# Patient Record
Sex: FEMALE | Race: WHITE | NOT HISPANIC OR LATINO
[De-identification: names, ages, dates, MRNs, and addresses within clinical notes are randomized per-mention and may not be internally consistent; named-entity substitution may affect disease eponyms.]

---

## 2018-11-10 ENCOUNTER — TRANSCRIPTION ENCOUNTER (OUTPATIENT)
Age: 48
End: 2018-11-10

## 2019-05-19 ENCOUNTER — TRANSCRIPTION ENCOUNTER (OUTPATIENT)
Age: 49
End: 2019-05-19

## 2021-04-27 ENCOUNTER — EMERGENCY (EMERGENCY)
Facility: HOSPITAL | Age: 51
LOS: 1 days | Discharge: HOME | End: 2021-04-27
Attending: EMERGENCY MEDICINE
Payer: COMMERCIAL

## 2021-04-27 ENCOUNTER — TRANSCRIPTION ENCOUNTER (OUTPATIENT)
Age: 51
End: 2021-04-27

## 2021-04-27 VITALS
DIASTOLIC BLOOD PRESSURE: 80 MMHG | TEMPERATURE: 97 F | RESPIRATION RATE: 18 BRPM | OXYGEN SATURATION: 100 % | WEIGHT: 205.03 LBS | SYSTOLIC BLOOD PRESSURE: 131 MMHG | HEART RATE: 75 BPM | HEIGHT: 62.5 IN

## 2021-04-27 VITALS
HEART RATE: 81 BPM | OXYGEN SATURATION: 100 % | RESPIRATION RATE: 20 BRPM | SYSTOLIC BLOOD PRESSURE: 150 MMHG | DIASTOLIC BLOOD PRESSURE: 68 MMHG | TEMPERATURE: 98 F

## 2021-04-27 DIAGNOSIS — Z98.891 HISTORY OF UTERINE SCAR FROM PREVIOUS SURGERY: Chronic | ICD-10-CM

## 2021-04-27 LAB
ALBUMIN SERPL ELPH-MCNC: 4.1 G/DL — SIGNIFICANT CHANGE UP (ref 3.3–5)
ALP SERPL-CCNC: 78 U/L — SIGNIFICANT CHANGE UP (ref 40–120)
ALT FLD-CCNC: 17 U/L — SIGNIFICANT CHANGE UP (ref 10–45)
ANION GAP SERPL CALC-SCNC: 12 MMOL/L — SIGNIFICANT CHANGE UP (ref 5–17)
APPEARANCE UR: CLEAR — SIGNIFICANT CHANGE UP
APTT BLD: 33.7 SEC — SIGNIFICANT CHANGE UP (ref 27.5–35.5)
AST SERPL-CCNC: 17 U/L — SIGNIFICANT CHANGE UP (ref 10–40)
BACTERIA # UR AUTO: NEGATIVE — SIGNIFICANT CHANGE UP
BASOPHILS # BLD AUTO: 0.04 K/UL — SIGNIFICANT CHANGE UP (ref 0–0.2)
BASOPHILS NFR BLD AUTO: 0.4 % — SIGNIFICANT CHANGE UP (ref 0–2)
BILIRUB SERPL-MCNC: 0.4 MG/DL — SIGNIFICANT CHANGE UP (ref 0.2–1.2)
BILIRUB UR-MCNC: NEGATIVE — SIGNIFICANT CHANGE UP
BUN SERPL-MCNC: 9 MG/DL — SIGNIFICANT CHANGE UP (ref 7–23)
CALCIUM SERPL-MCNC: 9.4 MG/DL — SIGNIFICANT CHANGE UP (ref 8.4–10.5)
CHLORIDE SERPL-SCNC: 101 MMOL/L — SIGNIFICANT CHANGE UP (ref 96–108)
CO2 SERPL-SCNC: 22 MMOL/L — SIGNIFICANT CHANGE UP (ref 22–31)
COLOR SPEC: YELLOW — SIGNIFICANT CHANGE UP
CREAT SERPL-MCNC: 0.74 MG/DL — SIGNIFICANT CHANGE UP (ref 0.5–1.3)
DIFF PNL FLD: NEGATIVE — SIGNIFICANT CHANGE UP
EOSINOPHIL # BLD AUTO: 0.51 K/UL — HIGH (ref 0–0.5)
EOSINOPHIL NFR BLD AUTO: 5.3 % — SIGNIFICANT CHANGE UP (ref 0–6)
EPI CELLS # UR: 1 /HPF — SIGNIFICANT CHANGE UP
GLUCOSE SERPL-MCNC: 91 MG/DL — SIGNIFICANT CHANGE UP (ref 70–99)
GLUCOSE UR QL: NEGATIVE — SIGNIFICANT CHANGE UP
HCG SERPL-ACNC: <2 MIU/ML — SIGNIFICANT CHANGE UP
HCT VFR BLD CALC: 43.1 % — SIGNIFICANT CHANGE UP (ref 34.5–45)
HGB BLD-MCNC: 13.9 G/DL — SIGNIFICANT CHANGE UP (ref 11.5–15.5)
HYALINE CASTS # UR AUTO: 1 /LPF — SIGNIFICANT CHANGE UP (ref 0–2)
IMM GRANULOCYTES NFR BLD AUTO: 0.3 % — SIGNIFICANT CHANGE UP (ref 0–1.5)
INR BLD: 1.03 RATIO — SIGNIFICANT CHANGE UP (ref 0.88–1.16)
KETONES UR-MCNC: NEGATIVE — SIGNIFICANT CHANGE UP
LEUKOCYTE ESTERASE UR-ACNC: NEGATIVE — SIGNIFICANT CHANGE UP
LYMPHOCYTES # BLD AUTO: 2.66 K/UL — SIGNIFICANT CHANGE UP (ref 1–3.3)
LYMPHOCYTES # BLD AUTO: 27.8 % — SIGNIFICANT CHANGE UP (ref 13–44)
MCHC RBC-ENTMCNC: 30.4 PG — SIGNIFICANT CHANGE UP (ref 27–34)
MCHC RBC-ENTMCNC: 32.3 GM/DL — SIGNIFICANT CHANGE UP (ref 32–36)
MCV RBC AUTO: 94.3 FL — SIGNIFICANT CHANGE UP (ref 80–100)
MONOCYTES # BLD AUTO: 0.62 K/UL — SIGNIFICANT CHANGE UP (ref 0–0.9)
MONOCYTES NFR BLD AUTO: 6.5 % — SIGNIFICANT CHANGE UP (ref 2–14)
NEUTROPHILS # BLD AUTO: 5.7 K/UL — SIGNIFICANT CHANGE UP (ref 1.8–7.4)
NEUTROPHILS NFR BLD AUTO: 59.7 % — SIGNIFICANT CHANGE UP (ref 43–77)
NITRITE UR-MCNC: NEGATIVE — SIGNIFICANT CHANGE UP
NRBC # BLD: 0 /100 WBCS — SIGNIFICANT CHANGE UP (ref 0–0)
PH UR: 6 — SIGNIFICANT CHANGE UP (ref 5–8)
PLATELET # BLD AUTO: 411 K/UL — HIGH (ref 150–400)
POTASSIUM SERPL-MCNC: 3.8 MMOL/L — SIGNIFICANT CHANGE UP (ref 3.5–5.3)
POTASSIUM SERPL-SCNC: 3.8 MMOL/L — SIGNIFICANT CHANGE UP (ref 3.5–5.3)
PROT SERPL-MCNC: 7.2 G/DL — SIGNIFICANT CHANGE UP (ref 6–8.3)
PROT UR-MCNC: NEGATIVE — SIGNIFICANT CHANGE UP
PROTHROM AB SERPL-ACNC: 12.3 SEC — SIGNIFICANT CHANGE UP (ref 10.6–13.6)
RBC # BLD: 4.57 M/UL — SIGNIFICANT CHANGE UP (ref 3.8–5.2)
RBC # FLD: 12.9 % — SIGNIFICANT CHANGE UP (ref 10.3–14.5)
RBC CASTS # UR COMP ASSIST: 3 /HPF — SIGNIFICANT CHANGE UP (ref 0–4)
SODIUM SERPL-SCNC: 135 MMOL/L — SIGNIFICANT CHANGE UP (ref 135–145)
SP GR SPEC: 1.02 — SIGNIFICANT CHANGE UP (ref 1.01–1.02)
UROBILINOGEN FLD QL: NEGATIVE — SIGNIFICANT CHANGE UP
WBC # BLD: 9.56 K/UL — SIGNIFICANT CHANGE UP (ref 3.8–10.5)
WBC # FLD AUTO: 9.56 K/UL — SIGNIFICANT CHANGE UP (ref 3.8–10.5)
WBC UR QL: 1 /HPF — SIGNIFICANT CHANGE UP (ref 0–5)

## 2021-04-27 PROCEDURE — 74177 CT ABD & PELVIS W/CONTRAST: CPT | Mod: 26,MA

## 2021-04-27 PROCEDURE — 81001 URINALYSIS AUTO W/SCOPE: CPT

## 2021-04-27 PROCEDURE — 83690 ASSAY OF LIPASE: CPT

## 2021-04-27 PROCEDURE — 87086 URINE CULTURE/COLONY COUNT: CPT

## 2021-04-27 PROCEDURE — 74177 CT ABD & PELVIS W/CONTRAST: CPT

## 2021-04-27 PROCEDURE — 85610 PROTHROMBIN TIME: CPT

## 2021-04-27 PROCEDURE — 99284 EMERGENCY DEPT VISIT MOD MDM: CPT | Mod: 25

## 2021-04-27 PROCEDURE — 85025 COMPLETE CBC W/AUTO DIFF WBC: CPT

## 2021-04-27 PROCEDURE — 85730 THROMBOPLASTIN TIME PARTIAL: CPT

## 2021-04-27 PROCEDURE — 84702 CHORIONIC GONADOTROPIN TEST: CPT

## 2021-04-27 PROCEDURE — 99284 EMERGENCY DEPT VISIT MOD MDM: CPT

## 2021-04-27 PROCEDURE — 36000 PLACE NEEDLE IN VEIN: CPT | Mod: XU

## 2021-04-27 PROCEDURE — 80053 COMPREHEN METABOLIC PANEL: CPT

## 2021-04-27 RX ORDER — ACETAMINOPHEN 500 MG
975 TABLET ORAL ONCE
Refills: 0 | Status: COMPLETED | OUTPATIENT
Start: 2021-04-27 | End: 2021-04-27

## 2021-04-27 RX ORDER — SODIUM CHLORIDE 9 MG/ML
1000 INJECTION INTRAMUSCULAR; INTRAVENOUS; SUBCUTANEOUS ONCE
Refills: 0 | Status: COMPLETED | OUTPATIENT
Start: 2021-04-27 | End: 2021-04-27

## 2021-04-27 RX ADMIN — SODIUM CHLORIDE 1000 MILLILITER(S): 9 INJECTION INTRAMUSCULAR; INTRAVENOUS; SUBCUTANEOUS at 20:36

## 2021-04-27 RX ADMIN — Medication 975 MILLIGRAM(S): at 18:30

## 2021-04-27 NOTE — ED PROVIDER NOTE - NSFOLLOWUPINSTRUCTIONS_ED_ALL_ED_FT
1- Tylenol as needed for pain  2- Continue all medications as prescribed  3- Follow up with Dr Grey as planned on Monday  4- Return to ER for any new or worsening symptoms

## 2021-04-27 NOTE — ED PROVIDER NOTE - CARE PROVIDER_API CALL
Porter Grey  GASTROENTEROLOGY  [address] [place] [address], [address] [place] [address]  Phone: [phone]  Fax: [phone]  Follow Up Time:

## 2021-04-27 NOTE — ED ADULT NURSE NOTE - NSIMPLEMENTINTERV_GEN_ALL_ED
Implemented All Universal Safety Interventions:  [place] to call system. Call bell, personal items and telephone within reach. Instruct patient to call for assistance. Room bathroom lighting operational. Non-slip footwear when patient is off stretcher. Physically safe environment: no spills, clutter or unnecessary equipment. Stretcher in lowest position, wheels locked, appropriate side rails in place.

## 2021-04-27 NOTE — ED PROVIDER NOTE - PATIENT PORTAL LINK FT
You can access the FollowMyHealth Patient Portal offered by [facility] by registering at the following website: [email or web address]. By joining [facility]’s FollowMyHealth portal, you will also be able to view your health information using other applications (apps) compatible with our system.

## 2021-04-27 NOTE — ED ADULT NURSE NOTE - OBJECTIVE STATEMENT
50y PMHx depression & neuropathy [facility] c/o LLQ pain since last Wednesday. Endorses pain exacerbation with PO intake so decreased PO intake since onset of Sx, cannot identify any relieving factors. Endorses FHx diverticulitis. Denies constipation, bright red or black stools, CP, SOB, H/A, N/V/D, f/c, dizziness, & urinary symptoms. A&Ox4. No respiratory distress noted on RA. Abdomen soft, nondistended, & LLQ tender to palpation. Negative B/L CVA tenderness with mild suprapubic tenderness. Skin warm, dry, & intact. MAEx4, able to ambulate independently. No LE edema noted on exam.

## 2021-04-27 NOTE — ED PROVIDER NOTE - RAPID ASSESSMENT
50 F presents with lower abdominal pain left worse than right. Denies Nausea, vomiting, diarrhea, fever, or chills. Endorses omeprazole daily. Last endorsed Tylenol yesterday.      Scribe Statement: I, Daysi Mccarty, attest that this documentation has been prepared under the direction and in the presence of Prabhakar Tucker (PA) 50 F presents with lower abdominal pain left worse than right. Denies Nausea, vomiting, diarrhea, fever, or chills. Endorses omeprazole daily. Last endorsed Tylenol yesterday.      Scribe Statement: I, Daysi Mccarty, attest that this documentation has been prepared under the direction and in the presence of Prabhakar Avina) 50 F presents with lower abdominal pain left worse than right. Denies Nausea, vomiting, diarrhea, hematochezia, melena, fever, or chills. Endorses omeprazole daily. Last endorsed Tylenol yesterday. FHx fo diverticulosis bu no personal hx. PSHx C/Sx2. Last BM this afternoon- normal.     Scribe Statement: ILul Tiffany, attest that this documentation has been prepared under the direction and in the presence of Prabhakar Avina (PA)    Prabhakar VELASQUEZ PA-C, personally performed the service described in the documentation recorded by the scribe in my presence, and it accurately and completely records my words and actions.

## 2021-04-27 NOTE — ED PROVIDER NOTE - OBJECTIVE STATEMENT
50 y.o. female history of 2 's, GERD, coming in with 1 weeks of LLQ pain.  Pain is worse after eating, no radiation.  No dysuria, no frequency, no gross hematuria.  No fevers no chills.  No nausea no vomiting.  + family history of diverticulitis.  Last BM today and was normal.  Menses has become irregular, unsure her LMP but they are becoming less frequent. 50 y.o. female history of 2 's, GERD, coming in with 1 weeks of LLQ pain.  Pain is worse after eating, no radiation.  No dysuria, no frequency, no gross hematuria.  No fevers no chills.  No nausea no vomiting.  + family history of diverticulitis.  Last BM today and was normal.  Menses has become irregular, unsure her LMP but they are becoming less frequent.    Attendinyo female presents with LLQ pain for about 1 week.  no nausea.  no fever.  has worse pain with eating.

## 2021-04-28 LAB
CULTURE RESULTS: SIGNIFICANT CHANGE UP
SPECIMEN SOURCE: SIGNIFICANT CHANGE UP

## 2021-05-10 ENCOUNTER — INPATIENT (INPATIENT)
Facility: HOSPITAL | Age: 51
LOS: 3 days | Discharge: HOME | DRG: 392 | End: 2021-05-14
Attending: INTERNAL MEDICINE | Admitting: INTERNAL MEDICINE
Payer: COMMERCIAL

## 2021-05-10 VITALS
HEART RATE: 84 BPM | DIASTOLIC BLOOD PRESSURE: 78 MMHG | SYSTOLIC BLOOD PRESSURE: 118 MMHG | TEMPERATURE: 99 F | WEIGHT: 205.03 LBS | OXYGEN SATURATION: 97 % | RESPIRATION RATE: 16 BRPM | HEIGHT: 62.5 IN

## 2021-05-10 DIAGNOSIS — Z98.891 HISTORY OF UTERINE SCAR FROM PREVIOUS SURGERY: Chronic | ICD-10-CM

## 2021-05-10 DIAGNOSIS — Z98.891 HISTORY OF UTERINE SCAR FROM PREVIOUS SURGERY: ICD-10-CM

## 2021-05-10 PROBLEM — M19.90 UNSPECIFIED OSTEOARTHRITIS, UNSPECIFIED SITE: Chronic | Status: ACTIVE | Noted: 2021-04-27

## 2021-05-10 LAB
ALBUMIN SERPL ELPH-MCNC: 4 G/DL — SIGNIFICANT CHANGE UP (ref 3.3–5)
ALP SERPL-CCNC: 64 U/L — SIGNIFICANT CHANGE UP (ref 40–120)
ALT FLD-CCNC: 18 U/L — SIGNIFICANT CHANGE UP (ref 10–45)
ANION GAP SERPL CALC-SCNC: 13 MMOL/L — SIGNIFICANT CHANGE UP (ref 5–17)
APPEARANCE UR: CLEAR — SIGNIFICANT CHANGE UP
AST SERPL-CCNC: 20 U/L — SIGNIFICANT CHANGE UP (ref 10–40)
BASE EXCESS BLDV CALC-SCNC: 0.7 MMOL/L — SIGNIFICANT CHANGE UP (ref -2–2)
BASOPHILS # BLD AUTO: 0.08 K/UL — SIGNIFICANT CHANGE UP (ref 0–0.2)
BASOPHILS NFR BLD AUTO: 1.1 % — SIGNIFICANT CHANGE UP (ref 0–2)
BILIRUB SERPL-MCNC: 0.3 MG/DL — SIGNIFICANT CHANGE UP (ref 0.2–1.2)
BILIRUB UR-MCNC: NEGATIVE — SIGNIFICANT CHANGE UP
BUN SERPL-MCNC: 6 MG/DL — LOW (ref 7–23)
CA-I SERPL-SCNC: 1.29 MMOL/L — SIGNIFICANT CHANGE UP (ref 1.12–1.3)
CALCIUM SERPL-MCNC: 9 MG/DL — SIGNIFICANT CHANGE UP (ref 8.4–10.5)
CHLORIDE BLDV-SCNC: 106 MMOL/L — SIGNIFICANT CHANGE UP (ref 96–108)
CHLORIDE SERPL-SCNC: 105 MMOL/L — SIGNIFICANT CHANGE UP (ref 96–108)
CO2 BLDV-SCNC: 28 MMOL/L — SIGNIFICANT CHANGE UP (ref 22–30)
CO2 SERPL-SCNC: 22 MMOL/L — SIGNIFICANT CHANGE UP (ref 22–31)
COLOR SPEC: YELLOW — SIGNIFICANT CHANGE UP
CREAT SERPL-MCNC: 0.91 MG/DL — SIGNIFICANT CHANGE UP (ref 0.5–1.3)
DIFF PNL FLD: NEGATIVE — SIGNIFICANT CHANGE UP
EOSINOPHIL # BLD AUTO: 0.46 K/UL — SIGNIFICANT CHANGE UP (ref 0–0.5)
EOSINOPHIL NFR BLD AUTO: 6.1 % — HIGH (ref 0–6)
GAS PNL BLDV: 144 MMOL/L — SIGNIFICANT CHANGE UP (ref 135–145)
GAS PNL BLDV: SIGNIFICANT CHANGE UP
GAS PNL BLDV: SIGNIFICANT CHANGE UP
GLUCOSE BLDV-MCNC: 146 MG/DL — HIGH (ref 70–99)
GLUCOSE SERPL-MCNC: 140 MG/DL — HIGH (ref 70–99)
GLUCOSE UR QL: NEGATIVE — SIGNIFICANT CHANGE UP
HCG SERPL-ACNC: <2 MIU/ML — SIGNIFICANT CHANGE UP
HCO3 BLDV-SCNC: 27 MMOL/L — SIGNIFICANT CHANGE UP (ref 21–29)
HCT VFR BLD CALC: 41.7 % — SIGNIFICANT CHANGE UP (ref 34.5–45)
HCT VFR BLDA CALC: 42 % — SIGNIFICANT CHANGE UP (ref 39–50)
HGB BLD CALC-MCNC: 13.5 G/DL — SIGNIFICANT CHANGE UP (ref 11.5–15.5)
HGB BLD-MCNC: 13.4 G/DL — SIGNIFICANT CHANGE UP (ref 11.5–15.5)
IMM GRANULOCYTES NFR BLD AUTO: 0.3 % — SIGNIFICANT CHANGE UP (ref 0–1.5)
KETONES UR-MCNC: SIGNIFICANT CHANGE UP
LACTATE BLDV-MCNC: 1.5 MMOL/L — SIGNIFICANT CHANGE UP (ref 0.7–2)
LEUKOCYTE ESTERASE UR-ACNC: NEGATIVE — SIGNIFICANT CHANGE UP
LYMPHOCYTES # BLD AUTO: 1.96 K/UL — SIGNIFICANT CHANGE UP (ref 1–3.3)
LYMPHOCYTES # BLD AUTO: 26.1 % — SIGNIFICANT CHANGE UP (ref 13–44)
MCHC RBC-ENTMCNC: 30.1 PG — SIGNIFICANT CHANGE UP (ref 27–34)
MCHC RBC-ENTMCNC: 32.1 GM/DL — SIGNIFICANT CHANGE UP (ref 32–36)
MCV RBC AUTO: 93.7 FL — SIGNIFICANT CHANGE UP (ref 80–100)
MONOCYTES # BLD AUTO: 0.48 K/UL — SIGNIFICANT CHANGE UP (ref 0–0.9)
MONOCYTES NFR BLD AUTO: 6.4 % — SIGNIFICANT CHANGE UP (ref 2–14)
NEUTROPHILS # BLD AUTO: 4.5 K/UL — SIGNIFICANT CHANGE UP (ref 1.8–7.4)
NEUTROPHILS NFR BLD AUTO: 60 % — SIGNIFICANT CHANGE UP (ref 43–77)
NITRITE UR-MCNC: NEGATIVE — SIGNIFICANT CHANGE UP
NRBC # BLD: 0 /100 WBCS — SIGNIFICANT CHANGE UP (ref 0–0)
PCO2 BLDV: 51 MMHG — HIGH (ref 35–50)
PH BLDV: 7.34 — LOW (ref 7.35–7.45)
PH UR: 6 — SIGNIFICANT CHANGE UP (ref 5–8)
PLATELET # BLD AUTO: 314 K/UL — SIGNIFICANT CHANGE UP (ref 150–400)
PO2 BLDV: 20 MMHG — LOW (ref 25–45)
POTASSIUM BLDV-SCNC: 3.4 MMOL/L — LOW (ref 3.5–5.3)
POTASSIUM SERPL-MCNC: 3.7 MMOL/L — SIGNIFICANT CHANGE UP (ref 3.5–5.3)
POTASSIUM SERPL-SCNC: 3.7 MMOL/L — SIGNIFICANT CHANGE UP (ref 3.5–5.3)
PROT SERPL-MCNC: 6.7 G/DL — SIGNIFICANT CHANGE UP (ref 6–8.3)
PROT UR-MCNC: 100 — SIGNIFICANT CHANGE UP
RBC # BLD: 4.45 M/UL — SIGNIFICANT CHANGE UP (ref 3.8–5.2)
RBC # FLD: 12.6 % — SIGNIFICANT CHANGE UP (ref 10.3–14.5)
SAO2 % BLDV: 25 % — LOW (ref 67–88)
SARS-COV-2 RNA SPEC QL NAA+PROBE: SIGNIFICANT CHANGE UP
SODIUM SERPL-SCNC: 140 MMOL/L — SIGNIFICANT CHANGE UP (ref 135–145)
SP GR SPEC: 1.03 — HIGH (ref 1.01–1.02)
UROBILINOGEN FLD QL: NEGATIVE — SIGNIFICANT CHANGE UP
WBC # BLD: 7.5 K/UL — SIGNIFICANT CHANGE UP (ref 3.8–10.5)
WBC # FLD AUTO: 7.5 K/UL — SIGNIFICANT CHANGE UP (ref 3.8–10.5)

## 2021-05-10 PROCEDURE — 99285 EMERGENCY DEPT VISIT HI MDM: CPT

## 2021-05-10 RX ORDER — GABAPENTIN 400 MG/1
600 CAPSULE ORAL DAILY
Refills: 0 | Status: DISCONTINUED | OUTPATIENT
Start: 2021-05-10 | End: 2021-05-14

## 2021-05-10 RX ORDER — VALACYCLOVIR 500 MG/1
1000 TABLET, FILM COATED ORAL DAILY
Refills: 0 | Status: DISCONTINUED | OUTPATIENT
Start: 2021-05-10 | End: 2021-05-12

## 2021-05-10 RX ORDER — TRAMADOL HYDROCHLORIDE 50 MG/1
25 TABLET ORAL EVERY 6 HOURS
Refills: 0 | Status: DISCONTINUED | OUTPATIENT
Start: 2021-05-10 | End: 2021-05-14

## 2021-05-10 RX ORDER — LIDOCAINE 4 G/100G
10 CREAM TOPICAL ONCE
Refills: 0 | Status: COMPLETED | OUTPATIENT
Start: 2021-05-10 | End: 2021-05-10

## 2021-05-10 RX ORDER — ACETAMINOPHEN 500 MG
650 TABLET ORAL EVERY 6 HOURS
Refills: 0 | Status: DISCONTINUED | OUTPATIENT
Start: 2021-05-10 | End: 2021-05-14

## 2021-05-10 RX ORDER — LANOLIN ALCOHOL/MO/W.PET/CERES
5 CREAM (GRAM) TOPICAL ONCE
Refills: 0 | Status: COMPLETED | OUTPATIENT
Start: 2021-05-10 | End: 2021-05-10

## 2021-05-10 RX ORDER — LORATADINE 10 MG/1
10 TABLET ORAL DAILY
Refills: 0 | Status: DISCONTINUED | OUTPATIENT
Start: 2021-05-10 | End: 2021-05-14

## 2021-05-10 RX ORDER — SODIUM CHLORIDE 9 MG/ML
1000 INJECTION INTRAMUSCULAR; INTRAVENOUS; SUBCUTANEOUS ONCE
Refills: 0 | Status: COMPLETED | OUTPATIENT
Start: 2021-05-10 | End: 2021-05-10

## 2021-05-10 RX ORDER — CITALOPRAM 10 MG/1
20 TABLET, FILM COATED ORAL DAILY
Refills: 0 | Status: DISCONTINUED | OUTPATIENT
Start: 2021-05-10 | End: 2021-05-14

## 2021-05-10 RX ORDER — FAMOTIDINE 10 MG/ML
20 INJECTION INTRAVENOUS ONCE
Refills: 0 | Status: COMPLETED | OUTPATIENT
Start: 2021-05-10 | End: 2021-05-10

## 2021-05-10 RX ADMIN — GABAPENTIN 600 MILLIGRAM(S): 400 CAPSULE ORAL at 22:00

## 2021-05-10 RX ADMIN — Medication 5 MILLIGRAM(S): at 22:31

## 2021-05-10 RX ADMIN — LIDOCAINE 10 MILLILITER(S): 4 CREAM TOPICAL at 15:15

## 2021-05-10 RX ADMIN — CITALOPRAM 20 MILLIGRAM(S): 10 TABLET, FILM COATED ORAL at 22:00

## 2021-05-10 RX ADMIN — Medication 30 MILLILITER(S): at 15:15

## 2021-05-10 RX ADMIN — SODIUM CHLORIDE 1000 MILLILITER(S): 9 INJECTION INTRAMUSCULAR; INTRAVENOUS; SUBCUTANEOUS at 15:16

## 2021-05-10 RX ADMIN — FAMOTIDINE 20 MILLIGRAM(S): 10 INJECTION INTRAVENOUS at 15:14

## 2021-05-10 RX ADMIN — LORATADINE 10 MILLIGRAM(S): 10 TABLET ORAL at 22:31

## 2021-05-10 RX ADMIN — VALACYCLOVIR 1000 MILLIGRAM(S): 500 TABLET, FILM COATED ORAL at 22:31

## 2021-05-10 RX ADMIN — Medication 20 MILLIGRAM(S): at 15:16

## 2021-05-10 NOTE — H&P ADULT - ASSESSMENT
Patient is a 51 year old female with PMHx of  x2 presenting with worsening abd pain "like someone punched me in the stomach" + left sided back pain and decreased PO intake x 3 weeks. Patient also notes being pale appearing and a temperature of 99.6 this past weekend. She endorses pain after eating and has not been able to tolerate anything but crackers and tea. Patient has had 2 outpatient CT scans in the past month showing diverticulosis without diverticulitis but GI prescribed her cipro/ flagyl as symptoms were not improving. Patient also admits to taking Motrin over the past few months, confirms loose stools without blood prior to abx use as well. Abd soft, nondistended, mildly tender in LLQ.     # Abd Pain:  Normal CT of a/p on 2021  Low fiber diet  Hold off abx for now  NO MOTRIN FOR PAIN  Follow up UCx  Appreciate GI consult     # DVT ppx:  Low risk  SCD Patient is a 51 year old female with PMHx of  x2 presenting with worsening abd pain "like someone punched me in the stomach" + left sided back pain and decreased PO intake x 3 weeks. Patient also notes being pale appearing and a temperature of 99.6 this past weekend. She endorses pain after eating and has not been able to tolerate anything but crackers and tea. Patient has had 2 outpatient CT scans in the past month showing diverticulosis without diverticulitis but GI prescribed her cipro/ flagyl as symptoms were not improving. Patient also admits to taking Motrin over the past few months, confirms loose stools without blood prior to abx use as well. Abd soft, nondistended, mildly tender in LLQ.     # Abd Pain:  Normal CT of a/p on 2021  s/p GI cocktail in ED  Hold off abx for now  Low fiber diet  NO MOTRIN FOR PAIN  Pain control - Tylenol 650MG Q6H PRN for mild pain, Tramadol 25MG Q6H PRN for moderate pain  Follow up UCx  Appreciate GI consult     # DVT ppx:  Low risk  SCD

## 2021-05-10 NOTE — ED ADULT NURSE NOTE - OBJECTIVE STATEMENT
52 yo female PSH csection x 2, presents to ED c/o of worsening abd pain and decreased PO intake x 3 weeks. Endorses pain worse after eating, has had 2 outpatient CT scans in the past month showing diverticulosis without diverticulitis but GI prescribed her cipro/ flagyl as symptoms were not improving. Confirms loose stools without blood prior to abx use as well. Abd soft, nondistended, mildly tender in LLQ. Denies CP, SOB, diarrhea, vomiting, urinary symptoms, weakness, fatigue, numbness, tingling in upper and lower extremities, HA, blurry vision. VSS updated on plan of care.

## 2021-05-10 NOTE — ED PROVIDER NOTE - OBJECTIVE STATEMENT
51 F with PSHx of  x2 presents with worsening mid abdominal pain radiating to left side x3 weeks. Reports onset of pain after eating. Noted Soft unformed stool. States has not eaten in the past couple of weeks. Describes pain as "someone punched me in the stomach." +left sided back pain, slight nausea, and lightheadedness. Pt notes being pale appearing with temperature of 99.6 this past weekend.         Gastroenterologist: Dr. Juan A Whitehead [phone]

## 2021-05-10 NOTE — H&P ADULT - NSHPLABSRESULTS_GEN_ALL_CORE
LABS:                        13.4   7.50  )-----------( 314      ( 10 May 2021 14:27 )             41.7     05-10    140  |  105  |  6<L>  ----------------------------<  140<H>  3.7   |  22  |  0.91    Ca    9.0      10 May 2021 14:27    TPro  6.7  /  Alb  4.0  /  TBili  0.3  /  DBili  x   /  AST  20  /  ALT  18  /  AlkPhos  64  05-10      CAPILLARY BLOOD GLUCOSE                RADIOLOGY & ADDITIONAL TESTS:    Imaging Personally Reviewed:  [x] YES  [ ] NO    Consultant(s) Notes Reviewed:  [x] YES  [ ] NO    Care Discussed with Consultants/Other Providers [x] YES  [ ] NO

## 2021-05-10 NOTE — ED ADULT NURSE NOTE - NSIMPLEMENTINTERV_GEN_ALL_ED
Implemented All Universal Safety Interventions:  [place] to call system. Call bell, personal items and telephone within reach. Instruct patient to call for assistance. Room bathroom lighting operational. Non-slip footwear when patient is off stretcher. Physically safe environment: no spills, clutter or unnecessary equipment. Stretcher in lowest position, wheels locked, appropriate side rails in place.

## 2021-05-10 NOTE — H&P ADULT - NSHPPHYSICALEXAM_GEN_ALL_CORE
Vital Signs Last 24 Hrs  T(C): 37 (10 May 2021 14:15), Max: 37.2 (10 May 2021 13:37)  T(F): 98.6 (10 May 2021 14:15), Max: 98.9 (10 May 2021 13:37)  HR: 78 (10 May 2021 14:15) (78 - 84)  BP: 115/72 (10 May 2021 14:15) (115/72 - 118/78)  BP(mean): --  RR: 18 (10 May 2021 14:15) (16 - 18)  SpO2: 98% (10 May 2021 14:15) (97% - 98%)    PHYSICAL EXAM:  GENERAL: NAD, well-developed, comfortable  HEAD:  Atraumatic, Normocephalic  EYES: EOMI, PERRLA, conjunctiva and sclera clear  NECK: Supple, No JVD  CHEST/LUNG: Clear to auscultation bilaterally; No wheeze  HEART: Regular rate and rhythm; No murmurs, rubs, or gallops  ABDOMEN: Soft, mild TTP to LLQ, Nondistended; Bowel sounds present  NEURO: AAOx3, no focal weakness, 5/5 b/l extremity strength, b/l knee no arthritis, no effusion   EXTREMITIES:  2+ Peripheral Pulses, No clubbing, cyanosis, or edema  SKIN: No rashes or lesions

## 2021-05-10 NOTE — ED PROVIDER NOTE - CLINICAL SUMMARY MEDICAL DECISION MAKING FREE TEXT BOX
Logan Thakur (MD): 51 F presents with recurrent abdominal pain, pt was here several days ago with normal CT here and at outpatient facility. Was seen by GI today and referred here for unclear reasons. Pt is well appearing, vital sign stable. Will obtain labs, GI cocktail, and discuss with GI.

## 2021-05-10 NOTE — H&P ADULT - HISTORY OF PRESENT ILLNESS
Patient is a 51 year old female with PMHx of  x2 presenting with worsening abd pain "like someone punched me in the stomach" + left sided back pain and decreased PO intake x 3 weeks. Patient also notes being pale appearing and a temperature of 99.6 this past weekend. She endorses pain after eating and has not been able to tolerate anything but crackers and tea. Patient has had 2 outpatient CT scans in the past month showing diverticulosis without diverticulitis but GI prescribed her cipro/ flagyl as symptoms were not improving. Patient also admits to taking Motrin over the past few months, confirms loose stools without blood prior to abx use as well. Abd soft, nondistended, mildly tender in LLQ.

## 2021-05-10 NOTE — H&P ADULT - NSHPREVIEWOFSYSTEMS_GEN_ALL_CORE
GENERAL: no weakness, no fever/chills, no weight loss/gain  EYES/ENT: No visual changes, no vertigo or throat pain  NECK: No pain or stiffness   RESPIRATORY: no cough, no wheezing, no hemoptysis, no dyspnea, no shortness of breath  CARDIOVASCULAR: no chest pain or palpitations  GASTROINTESTINAL: no n/v, + diarrhea, + abdominal or epigastric pain  GENITOURINARY: no dysuria, no frequency, no nocturia, no hematuria  MUSCULOSKELETAL: no trauma, no sprain/strain, no myalgias, no arthralgias, no fracture  NEUROLOGICAL: no HA, no dizziness, no weakness, no numbness  SKIN: No itching, rashes

## 2021-05-10 NOTE — H&P ADULT - ATTENDING COMMENTS
Pt seen and examined on 5/10/21    Agree with ACP note as above    Vitals/imaging/labs were reviewed    My physical examination of the patient corresponded to that documented above except as noted    51-yo female without any prior significant GI history who was sent in for further workup of abd pain since 4/21, intermittent, mostly upper quads but occasionally radiating to both flanks.  (+)She correlates many of her abd pain episodes to within 1-2 hrs after eating, no matter what food she eats.  No BRBPR, melena or change in frequency or texture of stools.  No (+)ROS for reactive arthropathy.  She has lost ~25 lbs in the last year, unintentionally.  (+)Reports frequent NSAID usage for various arthralgic locations.   Hold off antibiotics.  Tramadol/tylenol for pain.  Low-fiber diet.  Check stool guaiac and lactoferritin.  No further imaging tonight.  Suspect she has either NSAID-induced gastric vs duodenal ulcer or a functional bowel disorder.  Will consult GI.

## 2021-05-10 NOTE — H&P ADULT - TIME BILLING
Reviewing past chart, lab data, images, placing orders, arranging consultations, updating PCP, signout out to PA

## 2021-05-10 NOTE — ED PROVIDER NOTE - PHYSICAL EXAMINATION
GEN: NAD, awake, eyes open spontaneously  EYES: normal conjunctiva, perrl  ENT: NCAT, MMM, Trachea midline  CHEST/LUNGS: Non-tachypneic, CTAB, bilateral breath sounds  CARDIAC: Non-tachycardic, normal perfusion  ABDOMEN: Soft, minimal tenderness to deep palpation to LLQ and LUQ. Nondistended. No CVAT  MSK: No edema, no gross deformity of extremities  SKIN: No rashes, no petechiae, no vesicles  NEURO: CN grossly intact, normal coordination, no focal motor or sensory deficits  PSYCH: Alert, appropriate, cooperative, with capacity and insight

## 2021-05-11 LAB
ANION GAP SERPL CALC-SCNC: 14 MMOL/L — SIGNIFICANT CHANGE UP (ref 5–17)
BUN SERPL-MCNC: 5 MG/DL — LOW (ref 7–23)
CALCIUM SERPL-MCNC: 8.8 MG/DL — SIGNIFICANT CHANGE UP (ref 8.4–10.5)
CHLORIDE SERPL-SCNC: 108 MMOL/L — SIGNIFICANT CHANGE UP (ref 96–108)
CO2 SERPL-SCNC: 20 MMOL/L — LOW (ref 22–31)
COVID-19 SPIKE DOMAIN AB INTERP: POSITIVE
COVID-19 SPIKE DOMAIN ANTIBODY RESULT: 57.8 U/ML — HIGH
CREAT SERPL-MCNC: 0.88 MG/DL — SIGNIFICANT CHANGE UP (ref 0.5–1.3)
CRP SERPL-MCNC: 13 MG/L — HIGH (ref 0–4)
CULTURE RESULTS: NO GROWTH — SIGNIFICANT CHANGE UP
ERYTHROCYTE [SEDIMENTATION RATE] IN BLOOD: 15 MM/HR — SIGNIFICANT CHANGE UP (ref 0–20)
GLUCOSE SERPL-MCNC: 90 MG/DL — SIGNIFICANT CHANGE UP (ref 70–99)
HCT VFR BLD CALC: 39.3 % — SIGNIFICANT CHANGE UP (ref 34.5–45)
HGB BLD-MCNC: 13 G/DL — SIGNIFICANT CHANGE UP (ref 11.5–15.5)
MCHC RBC-ENTMCNC: 30.9 PG — SIGNIFICANT CHANGE UP (ref 27–34)
MCHC RBC-ENTMCNC: 33.1 GM/DL — SIGNIFICANT CHANGE UP (ref 32–36)
MCV RBC AUTO: 93.3 FL — SIGNIFICANT CHANGE UP (ref 80–100)
NRBC # BLD: 0 /100 WBCS — SIGNIFICANT CHANGE UP (ref 0–0)
OB PNL STL: NEGATIVE — SIGNIFICANT CHANGE UP
PLATELET # BLD AUTO: 291 K/UL — SIGNIFICANT CHANGE UP (ref 150–400)
POTASSIUM SERPL-MCNC: 3.8 MMOL/L — SIGNIFICANT CHANGE UP (ref 3.5–5.3)
POTASSIUM SERPL-SCNC: 3.8 MMOL/L — SIGNIFICANT CHANGE UP (ref 3.5–5.3)
RBC # BLD: 4.21 M/UL — SIGNIFICANT CHANGE UP (ref 3.8–5.2)
RBC # FLD: 12.8 % — SIGNIFICANT CHANGE UP (ref 10.3–14.5)
SARS-COV-2 IGG+IGM SERPL QL IA: 57.8 U/ML — HIGH
SARS-COV-2 IGG+IGM SERPL QL IA: POSITIVE
SODIUM SERPL-SCNC: 142 MMOL/L — SIGNIFICANT CHANGE UP (ref 135–145)
SPECIMEN SOURCE: SIGNIFICANT CHANGE UP
WBC # BLD: 7.87 K/UL — SIGNIFICANT CHANGE UP (ref 3.8–10.5)
WBC # FLD AUTO: 7.87 K/UL — SIGNIFICANT CHANGE UP (ref 3.8–10.5)

## 2021-05-11 RX ORDER — LANOLIN ALCOHOL/MO/W.PET/CERES
3 CREAM (GRAM) TOPICAL ONCE
Refills: 0 | Status: COMPLETED | OUTPATIENT
Start: 2021-05-11 | End: 2021-05-11

## 2021-05-11 RX ADMIN — GABAPENTIN 600 MILLIGRAM(S): 400 CAPSULE ORAL at 22:32

## 2021-05-11 RX ADMIN — CITALOPRAM 20 MILLIGRAM(S): 10 TABLET, FILM COATED ORAL at 22:32

## 2021-05-11 RX ADMIN — VALACYCLOVIR 1000 MILLIGRAM(S): 500 TABLET, FILM COATED ORAL at 22:32

## 2021-05-11 RX ADMIN — LORATADINE 10 MILLIGRAM(S): 10 TABLET ORAL at 22:32

## 2021-05-11 NOTE — PROGRESS NOTE ADULT - ASSESSMENT
Patient is a 51 year old female with PMHx of  x2 presenting with worsening abd pain "like someone punched me in the stomach" + left sided back pain and decreased PO intake x 3 weeks. Patient also notes being pale appearing and a temperature of 99.6 this past weekend. She endorses pain after eating and has not been able to tolerate anything but crackers and tea. Patient has had 2 outpatient CT scans in the past month showing diverticulosis without diverticulitis but GI prescribed her cipro/ flagyl as symptoms were not improving. Patient also admits to taking Motrin over the past few months, confirms loose stools without blood prior to abx use as well. Abd soft, nondistended, mildly tender in LLQ.     # Abd Pain:  Normal CT of a/p on 2021  s/p GI cocktail in ED  Hold off abx for now  Low fiber diet  NO MOTRIN FOR PAIN  Pain control - Tylenol 650MG Q6H PRN for mild pain, Tramadol 25MG Q6H PRN for moderate pain  Follow up UCx  Follow up stool samples - please send  ESR/CRP/calpro, MR entero r/o small bowel IBD  No role for abx as per GI  If above benign, US duplex and may consider repeating endoscopic evaluation  Follow up GI recs     # DVT ppx:  Low risk  SCD

## 2021-05-11 NOTE — PROGRESS NOTE ADULT - ATTENDING COMMENTS
Pt seen and examined on 5/11/21    Agree with ACP note as above    Vitals/imaging/labs were reviewed    My physical examination of the patient corresponded to that documented above except as noted        51-yo female without any prior significant GI history who was sent in for further workup of abd pain since 4/21, intermittent, mostly upper quads but occasionally radiating to both flanks.  (+)She correlates many of her abd pain episodes to within 1-2 hrs after eating, no matter what food she eats.  No BRBPR, melena or change in frequency or texture of stools.  No (+)ROS for reactive arthropathy.  She has lost ~25 lbs in the last year, unintentionally.  (+)Reports frequent NSAID usage for various arthralgic locations.   Hold off antibiotics.  Tramadol/tylenol for pain.  Low-fiber diet.  Check stool guaiac and lactoferritin.  ?NSAID gastropathy?  ?Functional bowel disorder?  MR enterography.  Considering mesenteric vascular duplex and EGD.  Appreciate GI.

## 2021-05-12 LAB
ALBUMIN SERPL ELPH-MCNC: 3.6 G/DL — SIGNIFICANT CHANGE UP (ref 3.3–5)
ALP SERPL-CCNC: 58 U/L — SIGNIFICANT CHANGE UP (ref 40–120)
ALT FLD-CCNC: 15 U/L — SIGNIFICANT CHANGE UP (ref 10–45)
ANION GAP SERPL CALC-SCNC: 11 MMOL/L — SIGNIFICANT CHANGE UP (ref 5–17)
AST SERPL-CCNC: 17 U/L — SIGNIFICANT CHANGE UP (ref 10–40)
BILIRUB SERPL-MCNC: 0.3 MG/DL — SIGNIFICANT CHANGE UP (ref 0.2–1.2)
BUN SERPL-MCNC: 5 MG/DL — LOW (ref 7–23)
CALCIUM SERPL-MCNC: 8.8 MG/DL — SIGNIFICANT CHANGE UP (ref 8.4–10.5)
CHLORIDE SERPL-SCNC: 107 MMOL/L — SIGNIFICANT CHANGE UP (ref 96–108)
CO2 SERPL-SCNC: 23 MMOL/L — SIGNIFICANT CHANGE UP (ref 22–31)
CREAT SERPL-MCNC: 0.89 MG/DL — SIGNIFICANT CHANGE UP (ref 0.5–1.3)
GLUCOSE SERPL-MCNC: 97 MG/DL — SIGNIFICANT CHANGE UP (ref 70–99)
HCT VFR BLD CALC: 39.3 % — SIGNIFICANT CHANGE UP (ref 34.5–45)
HGB BLD-MCNC: 12.9 G/DL — SIGNIFICANT CHANGE UP (ref 11.5–15.5)
MCHC RBC-ENTMCNC: 30.7 PG — SIGNIFICANT CHANGE UP (ref 27–34)
MCHC RBC-ENTMCNC: 32.8 GM/DL — SIGNIFICANT CHANGE UP (ref 32–36)
MCV RBC AUTO: 93.6 FL — SIGNIFICANT CHANGE UP (ref 80–100)
NRBC # BLD: 0 /100 WBCS — SIGNIFICANT CHANGE UP (ref 0–0)
PLATELET # BLD AUTO: 293 K/UL — SIGNIFICANT CHANGE UP (ref 150–400)
POTASSIUM SERPL-MCNC: 4.2 MMOL/L — SIGNIFICANT CHANGE UP (ref 3.5–5.3)
POTASSIUM SERPL-SCNC: 4.2 MMOL/L — SIGNIFICANT CHANGE UP (ref 3.5–5.3)
PROT SERPL-MCNC: 5.9 G/DL — LOW (ref 6–8.3)
RBC # BLD: 4.2 M/UL — SIGNIFICANT CHANGE UP (ref 3.8–5.2)
RBC # FLD: 12.9 % — SIGNIFICANT CHANGE UP (ref 10.3–14.5)
SODIUM SERPL-SCNC: 141 MMOL/L — SIGNIFICANT CHANGE UP (ref 135–145)
WBC # BLD: 7.35 K/UL — SIGNIFICANT CHANGE UP (ref 3.8–10.5)
WBC # FLD AUTO: 7.35 K/UL — SIGNIFICANT CHANGE UP (ref 3.8–10.5)

## 2021-05-12 PROCEDURE — 74182 MRI ABDOMEN W/CONTRAST: CPT | Mod: 26

## 2021-05-12 PROCEDURE — 93975 VASCULAR STUDY: CPT | Mod: 26

## 2021-05-12 PROCEDURE — 72197 MRI PELVIS W/O & W/DYE: CPT | Mod: 26

## 2021-05-12 RX ORDER — LANOLIN ALCOHOL/MO/W.PET/CERES
3 CREAM (GRAM) TOPICAL ONCE
Refills: 0 | Status: COMPLETED | OUTPATIENT
Start: 2021-05-12 | End: 2021-05-12

## 2021-05-12 RX ORDER — VALACYCLOVIR 500 MG/1
1000 TABLET, FILM COATED ORAL DAILY
Refills: 0 | Status: DISCONTINUED | OUTPATIENT
Start: 2021-05-12 | End: 2021-05-14

## 2021-05-12 RX ADMIN — Medication 650 MILLIGRAM(S): at 22:26

## 2021-05-12 RX ADMIN — CITALOPRAM 20 MILLIGRAM(S): 10 TABLET, FILM COATED ORAL at 21:19

## 2021-05-12 RX ADMIN — LORATADINE 10 MILLIGRAM(S): 10 TABLET ORAL at 21:19

## 2021-05-12 RX ADMIN — Medication 3 MILLIGRAM(S): at 21:19

## 2021-05-12 RX ADMIN — VALACYCLOVIR 1000 MILLIGRAM(S): 500 TABLET, FILM COATED ORAL at 22:13

## 2021-05-12 RX ADMIN — GABAPENTIN 600 MILLIGRAM(S): 400 CAPSULE ORAL at 21:20

## 2021-05-12 RX ADMIN — Medication 3 MILLIGRAM(S): at 01:14

## 2021-05-12 RX ADMIN — Medication 650 MILLIGRAM(S): at 21:26

## 2021-05-12 NOTE — PROGRESS NOTE ADULT - ASSESSMENT
Patient is a 51 year old female with PMHx of  x2 presenting with worsening abd pain "like someone punched me in the stomach" + left sided back pain and decreased PO intake x 3 weeks. Patient also notes being pale appearing and a temperature of 99.6 this past weekend. She endorses pain after eating and has not been able to tolerate anything but crackers and tea. Patient has had 2 outpatient CT scans in the past month showing diverticulosis without diverticulitis but GI prescribed her cipro/ flagyl as symptoms were not improving. Patient also admits to taking Motrin over the past few months, confirms loose stools without blood prior to abx use as well. Abd soft, nondistended, mildly tender in LLQ.     # Abd Pain  ?functional bowel disorder? ?NSAID-induced PUD? ?mesenteric ischemia? ?IBD?  Normal CT of a/p on 2021  Hold off abx for now  Low fiber diet  NO MOTRIN FOR PAIN  Pain control - Tylenol 650MG Q6H PRN for mild pain, Tramadol 25MG Q6H PRN for moderate pain  Follow up stool samples   MR entero r/o small bowel IBD -->  Mesenteric vascular duplex 21 -->  Follow up GI recs     # DVT ppx:  Low risk  SCD

## 2021-05-13 LAB
CALPROTECTIN STL-MCNT: 33 UG/G — SIGNIFICANT CHANGE UP (ref 0–120)
SARS-COV-2 RNA SPEC QL NAA+PROBE: SIGNIFICANT CHANGE UP

## 2021-05-13 RX ORDER — NYSTATIN 500MM UNIT
500000 POWDER (EA) MISCELLANEOUS EVERY 6 HOURS
Refills: 0 | Status: DISCONTINUED | OUTPATIENT
Start: 2021-05-13 | End: 2021-05-14

## 2021-05-13 RX ORDER — LACTOBACILLUS ACIDOPHILUS 100MM CELL
1 CAPSULE ORAL
Refills: 0 | Status: DISCONTINUED | OUTPATIENT
Start: 2021-05-13 | End: 2021-05-14

## 2021-05-13 RX ORDER — LANOLIN ALCOHOL/MO/W.PET/CERES
3 CREAM (GRAM) TOPICAL ONCE
Refills: 0 | Status: COMPLETED | OUTPATIENT
Start: 2021-05-13 | End: 2021-05-13

## 2021-05-13 RX ADMIN — Medication 500000 UNIT(S): at 17:14

## 2021-05-13 RX ADMIN — Medication 1 TABLET(S): at 12:08

## 2021-05-13 RX ADMIN — VALACYCLOVIR 1000 MILLIGRAM(S): 500 TABLET, FILM COATED ORAL at 21:05

## 2021-05-13 RX ADMIN — Medication 500000 UNIT(S): at 10:41

## 2021-05-13 RX ADMIN — Medication 500000 UNIT(S): at 12:08

## 2021-05-13 RX ADMIN — Medication 1 TABLET(S): at 17:13

## 2021-05-13 RX ADMIN — CITALOPRAM 20 MILLIGRAM(S): 10 TABLET, FILM COATED ORAL at 21:04

## 2021-05-13 RX ADMIN — LORATADINE 10 MILLIGRAM(S): 10 TABLET ORAL at 21:04

## 2021-05-13 RX ADMIN — Medication 3 MILLIGRAM(S): at 21:03

## 2021-05-13 RX ADMIN — GABAPENTIN 600 MILLIGRAM(S): 400 CAPSULE ORAL at 21:03

## 2021-05-13 NOTE — CONSULT NOTE ADULT - ASSESSMENT
50yo F with h/o c sections x2 presenting with 3 weeks of post-prandial abdominal pain. Mesenteric duplex concerning for MALS. Additional work up unrevealing otherwise, to this point.     - Agree with plan for EGD tomorrow.   - Recommend CTA abdomen to eval celiac artery.     Discussed with Dr. Blancas.   Will continue to follow.     Violet Vázquez, MIS fellow   [id number]
51F admitted with acute on chronic delayed post prandial abd pain with prior CT and labs benign. EGD/Colon benign 2018.    Rec  Diet as tolerated  ESR/CRP/calpro, MR entero r/o small bowel IBD.  No role for abx currently.  ? NSAID related ulcers/ small bowel stricture  ? adhesive disease from prior C section  ? vascular etiology  If above benign, US duplex and may consider repeating endoscopic evaluation.    [phone]

## 2021-05-13 NOTE — PROGRESS NOTE ADULT - ASSESSMENT
Patient is a 51 year old female with PMHx of  x2 presenting with worsening abd pain "like someone punched me in the stomach" + left sided back pain and decreased PO intake x 3 weeks. Patient also notes being pale appearing and a temperature of 99.6 this past weekend. She endorses pain after eating and has not been able to tolerate anything but crackers and tea. Patient has had 2 outpatient CT scans in the past month showing diverticulosis without diverticulitis but GI prescribed her cipro/ flagyl as symptoms were not improving. Patient also admits to taking Motrin over the past few months, confirms loose stools without blood prior to abx use as well. Abd soft, nondistended, mildly tender in LLQ.     # Abd Pain  ?functional bowel disorder? ?NSAID-induced PUD? ?mesenteric ischemia? ?IBD?  Normal CT abd/pelvis on 2021  Hold off abx for now  Low fiber diet  NO MOTRIN FOR PAIN  Pain control - Tylenol 650MG Q6H PRN for mild pain, Tramadol 25MG Q6H PRN for moderate pain  Follow up stool samples   MR entero r/o small bowel IBD -->nl  Mesenteric vascular duplex 21 --> could not exclude MALS  Vascular consult 21  NPO after MN for EGD 21  Appreciate GI    # DVT ppx:  Low risk  SCD

## 2021-05-13 NOTE — PROGRESS NOTE ADULT - ASSESSMENT
51F admitted with acute on chronic delayed post prandial abd pain with prior CT and labs benign. EGD/Colon benign 2018.    Rec  Diet as tolerated  MR entero negative for any small bowel pathology.  US Duplex noted.  Will repeat EGD in AM to rule out alternative upper GI pathology.  Discussed with Dr Blancas to review for possible MALS.    [phone]

## 2021-05-13 NOTE — CONSULT NOTE ADULT - SUBJECTIVE AND OBJECTIVE BOX
Patient is a 51y old  Female who presents with a chief complaint of abd pain (10 May 2021 16:27)      HPI:  Patient is a 51 year old female with PMHx of  x2 presenting with worsening abd pain "like someone punched me in the stomach" + left sided back pain and decreased PO intake x 3 weeks. Patient also notes being pale appearing and a temperature of 99.6 this past weekend. She endorses pain after eating and has not been able to tolerate anything but crackers and tea. Patient has had 2 outpatient CT scans in the past month showing diverticulosis without diverticulitis but GI prescribed her cipro/ flagyl as symptoms were not improving. Patient also admits to taking Motrin over the past few months, confirms loose stools without blood prior to abx use as well. Abd soft, nondistended, mildly tender in LLQ.  (10 May 2021 16:27)    Primarily left para umbilical pain 90 minutes post prandial, self resolving but limiting further PO intake. Tolerating limited PO.  No N/V  Somewhat loose stool  Prior EGD/Colon 2018 benign.      PAST MEDICAL & SURGICAL HISTORY:  Arthritis    H/O:  section  x 2        MEDICATIONS  (STANDING):  citalopram 20 milliGRAM(s) Oral daily  gabapentin 600 milliGRAM(s) Oral daily  loratadine 10 milliGRAM(s) Oral daily  valACYclovir 1000 milliGRAM(s) Oral daily    MEDICATIONS  (PRN):  acetaminophen   Tablet .. 650 milliGRAM(s) Oral every 6 hours PRN Mild Pain (1 - 3)  traMADol 25 milliGRAM(s) Oral every 6 hours PRN Moderate Pain (4 - 6)      Allergies    amoxicillin (Hives)  sulfa drugs (Hives)    Intolerances        Review of Systems:    General:  No wt loss, fevers, chills, night sweats,fatigue,   CV:  No pain, palpitations, hypo/hypertension  Resp:  No dyspnea, cough, tachypnea, wheezing  GI: see HPI  :  No pain, bleeding, incontinence, nocturia  Muscle:  No pain, weakness  Neuro:  No weakness, tingling, memory problems  Psych:  No fatigue, insomnia, mood problems, depression  Endocrine:  No polyuria, polydypsia, cold/heat intolerance  Heme:  No petechiae, ecchymosis, easy bruisability  Skin:  No rash, tattoos, scars, edema      Vital Signs Last 24 Hrs  T(C): 36.8 (11 May 2021 08:31), Max: 37.2 (10 May 2021 13:37)  T(F): 98.3 (11 May 2021 08:31), Max: 99 (10 May 2021 20:54)  HR: 73 (11 May 2021 08:31) (73 - 84)  BP: 110/72 (11 May 2021 08:31) (110/72 - 127/91)  BP(mean): --  RR: 18 (11 May 2021 08:31) (16 - 18)  SpO2: 96% (11 May 2021 08:31) (96% - 98%)    PHYSICAL EXAM:    Constitutional: NAD, well-developed  Neck: No LAD, supple  Respiratory: CTA and P  Cardiovascular: S1 and S2, RRR, no M  Gastrointestinal: BS+, soft, NT/ND, neg HSM,  Extremities: No peripheral edema, neg clubbing, cyanosis  Vascular: 2+ peripheral pulses  Neurological: A/O x 3, no focal deficits  Psychiatric: Normal mood, normal affect  Skin: No rashes      LABS:                        13.0   7.87  )-----------( 291      ( 11 May 2021 06:53 )             39.3                         13.4   7.50  )-----------( 314      ( 10 May 2021 14:27 )             41.7     05-11    142  |  108  |  5<L>  ----------------------------<  90  3.8   |  20<L>  |  0.88    Ca    8.8      11 May 2021 06:53    TPro  6.7  /  Alb  4.0  /  TBili  0.3  /  DBili  x   /  AST  20  /  ALT  18  /  AlkPhos  64  05-10      Urinalysis Basic - ( 10 May 2021 16:13 )    Color: Yellow / Appearance: Clear / S.027 / pH: x  Gluc: x / Ketone: Trace  / Bili: Negative / Urobili: Negative   Blood: x / Protein: 100 / Nitrite: Negative   Leuk Esterase: Negative / RBC: x / WBC x   Sq Epi: x / Non Sq Epi: x / Bacteria: x      LIVER FUNCTIONS - ( 10 May 2021 14:27 )  Alb: 4.0 g/dL / Pro: 6.7 g/dL / ALK PHOS: 64 U/L / ALT: 18 U/L / AST: 20 U/L / GGT: x                 RADIOLOGY & ADDITIONAL TESTS:  
                                                                                            General Surgery Consult  Consulting surgical team: Ravi  Consulting attending: Yonny    HPI:  Patient is a 51 year old female with PMHx of  x2 presenting with worsening abd pain "like someone punched me in the stomach" + left sided back pain and decreased PO intake x 3 weeks. She reports the pain as sharp, sometimes burning, epigastric to periumbilical, radiating to the back. It comes on consistently 1-1.5h after eating and sometimes after drinking as well. She also feels bloated in association with the pain. It happens every time she eats, and has been happening for the past 3 weeks since . She tried a course of cipro/flagyl but only developed diarrhea and her pain did not improve.     She has chronic reflux, controlled by omeprazole and her current pain feels completely different from that. She has never had pain like this before. She has had both upper endoscopies and colonoscopies in the past which were all normal, the most recent was 2-3 years ago.     She has lost 20 lbs in the past year without intending to and she is unable to identify a cause for her weight loss. She has not lost any weight in the past 3 weeks in association with her pain symptoms.     PAST MEDICAL HISTORY:  Arthritis  GERD    PAST SURGICAL HISTORY:  H/O:  section    MEDICATIONS:  acetaminophen   Tablet .. 650 milliGRAM(s) Oral every 6 hours PRN  citalopram 20 milliGRAM(s) Oral daily  gabapentin 600 milliGRAM(s) Oral daily  lactobacillus acidophilus 1 Tablet(s) Oral three times a day with meals  loratadine 10 milliGRAM(s) Oral daily  nystatin    Suspension 586387 Unit(s) Oral every 6 hours  traMADol 25 milliGRAM(s) Oral every 6 hours PRN  valACYclovir 1000 milliGRAM(s) Oral daily      ALLERGIES:  amoxicillin (Hives)  sulfa drugs (Hives)      VITALS & I/Os:  Vital Signs Last 24 Hrs  T(C): 36.8 (13 May 2021 07:25), Max: 36.9 (12 May 2021 23:48)  T(F): 98.2 (13 May 2021 07:25), Max: 98.4 (12 May 2021 23:48)  HR: 69 (13 May 2021 07:25) (69 - 75)  BP: 118/73 (13 May 2021 07:25) (104/63 - 118/73)  BP(mean): --  RR: 18 (13 May 2021 07:25) (18 - 18)  SpO2: 97% (13 May 2021 07:25) (97% - 98%)    I&O's Summary    13 May 2021 07:01  -  13 May 2021 16:33  --------------------------------------------------------  IN: 500 mL / OUT: 0 mL / NET: 500 mL        PHYSICAL EXAM:  General: No acute distress  Respiratory: Nonlabored  Cardiovascular: RRR  Abdominal: Soft, nondistended, nontender.   Extremities: Warm    LABS:                        12.9   7.35  )-----------( 293      ( 12 May 2021 06:54 )             39.3     05-12    141  |  107  |  5<L>  ----------------------------<  97  4.2   |  23  |  0.89    Ca    8.8      12 May 2021 06:56    TPro  5.9<L>  /  Alb  3.6  /  TBili  0.3  /  DBili  x   /  AST  17  /  ALT  15  /  AlkPhos  58  05-12    Lactate:                  IMAGING:  CT without pathology.   MRE without pathology.   Mesenteric duplex with increased velocities in celiac axis with end expiration. PT reported the same pain was reproduced as the probe was examining her celiac axis.

## 2021-05-14 ENCOUNTER — RESULT REVIEW (OUTPATIENT)
Age: 51
End: 2021-05-14

## 2021-05-14 ENCOUNTER — TRANSCRIPTION ENCOUNTER (OUTPATIENT)
Age: 51
End: 2021-05-14

## 2021-05-14 VITALS
SYSTOLIC BLOOD PRESSURE: 118 MMHG | OXYGEN SATURATION: 98 % | DIASTOLIC BLOOD PRESSURE: 77 MMHG | TEMPERATURE: 98 F | RESPIRATION RATE: 18 BRPM | HEART RATE: 78 BPM

## 2021-05-14 LAB
ANION GAP SERPL CALC-SCNC: 11 MMOL/L — SIGNIFICANT CHANGE UP (ref 5–17)
BUN SERPL-MCNC: 9 MG/DL — SIGNIFICANT CHANGE UP (ref 7–23)
CALCIUM SERPL-MCNC: 8.6 MG/DL — SIGNIFICANT CHANGE UP (ref 8.4–10.5)
CHLORIDE SERPL-SCNC: 106 MMOL/L — SIGNIFICANT CHANGE UP (ref 96–108)
CO2 SERPL-SCNC: 23 MMOL/L — SIGNIFICANT CHANGE UP (ref 22–31)
CREAT SERPL-MCNC: 0.84 MG/DL — SIGNIFICANT CHANGE UP (ref 0.5–1.3)
GLUCOSE SERPL-MCNC: 93 MG/DL — SIGNIFICANT CHANGE UP (ref 70–99)
HCT VFR BLD CALC: 38.7 % — SIGNIFICANT CHANGE UP (ref 34.5–45)
HGB BLD-MCNC: 12.6 G/DL — SIGNIFICANT CHANGE UP (ref 11.5–15.5)
MCHC RBC-ENTMCNC: 30.7 PG — SIGNIFICANT CHANGE UP (ref 27–34)
MCHC RBC-ENTMCNC: 32.6 GM/DL — SIGNIFICANT CHANGE UP (ref 32–36)
MCV RBC AUTO: 94.4 FL — SIGNIFICANT CHANGE UP (ref 80–100)
NRBC # BLD: 0 /100 WBCS — SIGNIFICANT CHANGE UP (ref 0–0)
PLATELET # BLD AUTO: 311 K/UL — SIGNIFICANT CHANGE UP (ref 150–400)
POTASSIUM SERPL-MCNC: 3.9 MMOL/L — SIGNIFICANT CHANGE UP (ref 3.5–5.3)
POTASSIUM SERPL-SCNC: 3.9 MMOL/L — SIGNIFICANT CHANGE UP (ref 3.5–5.3)
RBC # BLD: 4.1 M/UL — SIGNIFICANT CHANGE UP (ref 3.8–5.2)
RBC # FLD: 12.8 % — SIGNIFICANT CHANGE UP (ref 10.3–14.5)
SODIUM SERPL-SCNC: 140 MMOL/L — SIGNIFICANT CHANGE UP (ref 135–145)
WBC # BLD: 8.7 K/UL — SIGNIFICANT CHANGE UP (ref 3.8–10.5)
WBC # FLD AUTO: 8.7 K/UL — SIGNIFICANT CHANGE UP (ref 3.8–10.5)

## 2021-05-14 PROCEDURE — 84295 ASSAY OF SERUM SODIUM: CPT

## 2021-05-14 PROCEDURE — 74174 CTA ABD&PLVS W/CONTRAST: CPT

## 2021-05-14 PROCEDURE — 87086 URINE CULTURE/COLONY COUNT: CPT

## 2021-05-14 PROCEDURE — 86140 C-REACTIVE PROTEIN: CPT

## 2021-05-14 PROCEDURE — 84132 ASSAY OF SERUM POTASSIUM: CPT

## 2021-05-14 PROCEDURE — 85018 HEMOGLOBIN: CPT

## 2021-05-14 PROCEDURE — 74182 MRI ABDOMEN W/CONTRAST: CPT

## 2021-05-14 PROCEDURE — 83631 LACTOFERRIN FECAL (QUANT): CPT

## 2021-05-14 PROCEDURE — 83993 ASSAY FOR CALPROTECTIN FECAL: CPT

## 2021-05-14 PROCEDURE — 72197 MRI PELVIS W/O & W/DYE: CPT

## 2021-05-14 PROCEDURE — 74174 CTA ABD&PLVS W/CONTRAST: CPT | Mod: 26

## 2021-05-14 PROCEDURE — 82565 ASSAY OF CREATININE: CPT

## 2021-05-14 PROCEDURE — 82803 BLOOD GASES ANY COMBINATION: CPT

## 2021-05-14 PROCEDURE — 82435 ASSAY OF BLOOD CHLORIDE: CPT

## 2021-05-14 PROCEDURE — 82947 ASSAY GLUCOSE BLOOD QUANT: CPT

## 2021-05-14 PROCEDURE — 80053 COMPREHEN METABOLIC PANEL: CPT

## 2021-05-14 PROCEDURE — 88305 TISSUE EXAM BY PATHOLOGIST: CPT

## 2021-05-14 PROCEDURE — 84702 CHORIONIC GONADOTROPIN TEST: CPT

## 2021-05-14 PROCEDURE — 82330 ASSAY OF CALCIUM: CPT

## 2021-05-14 PROCEDURE — 85014 HEMATOCRIT: CPT

## 2021-05-14 PROCEDURE — U0005: CPT

## 2021-05-14 PROCEDURE — 93975 VASCULAR STUDY: CPT

## 2021-05-14 PROCEDURE — 85025 COMPLETE CBC W/AUTO DIFF WBC: CPT

## 2021-05-14 PROCEDURE — 99285 EMERGENCY DEPT VISIT HI MDM: CPT | Mod: 25

## 2021-05-14 PROCEDURE — 86769 SARS-COV-2 COVID-19 ANTIBODY: CPT

## 2021-05-14 PROCEDURE — 82272 OCCULT BLD FECES 1-3 TESTS: CPT

## 2021-05-14 PROCEDURE — 83605 ASSAY OF LACTIC ACID: CPT

## 2021-05-14 PROCEDURE — 80048 BASIC METABOLIC PNL TOTAL CA: CPT

## 2021-05-14 PROCEDURE — 85027 COMPLETE CBC AUTOMATED: CPT

## 2021-05-14 PROCEDURE — 88305 TISSUE EXAM BY PATHOLOGIST: CPT | Mod: 26

## 2021-05-14 PROCEDURE — 85652 RBC SED RATE AUTOMATED: CPT

## 2021-05-14 PROCEDURE — U0003: CPT

## 2021-05-14 RX ORDER — TRAMADOL HYDROCHLORIDE 50 MG/1
0.5 TABLET ORAL
Qty: 10 | Refills: 0
Start: 2021-05-14 | End: 2021-05-18

## 2021-05-14 RX ORDER — GABAPENTIN 400 MG/1
1 CAPSULE ORAL
Qty: 15 | Refills: 0
Start: 2021-05-14 | End: 2021-05-28

## 2021-05-14 RX ORDER — ACETAMINOPHEN 500 MG
2 TABLET ORAL
Qty: 40 | Refills: 0
Start: 2021-05-14 | End: 2021-05-18

## 2021-05-14 RX ORDER — CITALOPRAM 10 MG/1
1 TABLET, FILM COATED ORAL
Qty: 30 | Refills: 0
Start: 2021-05-14 | End: 2021-06-12

## 2021-05-14 RX ORDER — LORATADINE 10 MG/1
1 TABLET ORAL
Qty: 7 | Refills: 0
Start: 2021-05-14 | End: 2021-05-20

## 2021-05-14 RX ORDER — GABAPENTIN 400 MG/1
1 CAPSULE ORAL
Qty: 0 | Refills: 0 | DISCHARGE
Start: 2021-05-14 | End: 2021-05-28

## 2021-05-14 RX ORDER — VALACYCLOVIR 500 MG/1
1 TABLET, FILM COATED ORAL
Qty: 1 | Refills: 0
Start: 2021-05-14 | End: 2021-05-14

## 2021-05-14 RX ORDER — LACTOBACILLUS ACIDOPHILUS 100MM CELL
1 CAPSULE ORAL
Qty: 21 | Refills: 0
Start: 2021-05-14 | End: 2021-05-20

## 2021-05-14 RX ORDER — NYSTATIN 500MM UNIT
5 POWDER (EA) MISCELLANEOUS
Qty: 100 | Refills: 0
Start: 2021-05-14 | End: 2021-05-18

## 2021-05-14 RX ADMIN — Medication 1 TABLET(S): at 12:14

## 2021-05-14 RX ADMIN — Medication 500000 UNIT(S): at 12:15

## 2021-05-14 RX ADMIN — Medication 500000 UNIT(S): at 05:12

## 2021-05-14 RX ADMIN — Medication 500000 UNIT(S): at 05:09

## 2021-05-14 RX ADMIN — Medication 500000 UNIT(S): at 18:08

## 2021-05-14 RX ADMIN — Medication 1 TABLET(S): at 18:07

## 2021-05-14 NOTE — PRE-ANESTHESIA EVALUATION ADULT - NSANTHPMHFT_GEN_ALL_CORE
HSV  Abdominal pain  Possible median arcuate ligament syndrome CHRIS, uses a mouth device  HSV  Abdominal pain  Possible median arcuate ligament syndrome

## 2021-05-14 NOTE — PROGRESS NOTE ADULT - PROVIDER SPECIALTY LIST ADULT
Internal Medicine
Internal Medicine
Gastroenterology
Surgery
Gastroenterology
Internal Medicine
Internal Medicine

## 2021-05-14 NOTE — CHART NOTE - NSCHARTNOTEFT_GEN_A_CORE
CT Abdomen Pelvis W     Patient: TODD JIMENEZ  Date Of Exam: 04/30/2021  ID: [id number]  Ordering Provider:  SANTINO RIVAS MD       CT Abdomen Pelvis W: 4/30/2021 2:08 PM     INDICATION: Periumbilical pain. Evaluate for hernia     TECHNIQUE: CT of the abdomen and pelvis with intravenous contrast was   performed. 95 mL of intravenous Isovue-300 was administered without   complication. Oral contrast was administered. Sagittal and coronal   reconstructions were performed.     COMPARISON: There are no prior CTs of the abdomen and pelvis   available for comparison.     FINDINGS:     LOWER CHEST:     Lung bases clear. Heart normal in size.     ABDOMEN/PELVIS:     LIVER: Normal in size and contour. Subcentimeter left hepatic   hypodensity is too small to characterize  GALLBLADDER: Normal in size and wall thickness. No calcified stones.  BILIARY TREE: No intrahepatic or extrahepatic biliary ductal   dilatation.  SPLEEN: Normal in size. No suspicious mass.  PANCREAS: Normal in size. No suspicious mass.  ADRENAL GLANDS: No nodularity or thickening.  KIDNEYS: Normal in size. No suspicious mass. No hydronephrosis.  URETERS: No hydroureter.  BLADDER: Normal in size and wall thickness.  REPRODUCTIVE ORGANS: Uterus and ovaries unremarkable.  BOWEL: Normal in caliber and wall thickness.  PERITONEUM: No ascites. No pneumoperitoneum.  VESSELS: Patent and normal in caliber. No atherosclerotic   calcification.  LYMPH NODES: No suspicious lymphadenopathy.  SOFT TISSUES: No significant hernia.     MUSCULOSKELETAL:     No suspicious osseous lesions.     IMPRESSION:     Normal CT of the abdomen and pelvis     Electronically signed by Kamille Raymond MD 4/30/2021 2:27 PM   RADIOLOGIST:  KAMILLE RAYMOND, RADIOLOGIST  _____________________________________________________________________    External Attachment:      Type:     Image      Comment:  CT ABDOMEN PELVIS WITH CONTRAST    Filed automatically (without signature) on 04/30/2021 at 2:32 PM    ________________________________________________________________________    Electronically Signed by Santino Gal, MD on 04/30/2021 at 2:57 PM  ________________________________________________________________________
CTA results noted. Mild stenosis, though not a dynamic study.   Can continue diet as tolerated.  Biopsies from EGD pending results.   Please follow up with Dr. Blancas as an outpatient.     Discussed with MIS fellow  [id number]

## 2021-05-14 NOTE — PROGRESS NOTE ADULT - ATTENDING COMMENTS
51yF with 3 weeks of post-prandial epigastric/upper abdominal pain, consulted for concern for median arcuate ligament syndrome  Arterial dopplers demonstrated increased velocities (including increased end expiratory velocities) at celiac trunk.  MR enterography was normal    Patient feels well, no pain now, but reliably gets pain after eating.  Tolerating liquids and apple sauce without pain  Reports no changes in GI fxn    PAST MEDICAL & SURGICAL HISTORY:  Arthritis  Graves dz  chronic pain (neck pain and leg pain, on gabapentin)    H/O:  section  x 2    Nonsmoker    Vitals normal  Abdomen soft, minimally tender at epigastrium with deep palpation, no guarding or rebound    Impression:  51F with possible MALS  Recommend CTA  Endoscopy today to r/o gastritis/PUD    Discussed with Dr. Mccabe of GI  Explained in detail to patient the nature of this disease, the option for robotic surgery for median arcuate ligament release, as well as the possibility of continued symptoms even after surgery.  All questions answered.    Cheo Blancas MD

## 2021-05-14 NOTE — DISCHARGE NOTE PROVIDER - NSDCFUADDAPPT_GEN_ALL_CORE_FT
Follow up with gastroenterologist for biopsies from EGD.   Please follow up with Dr. Blancas as an outpatient- call to make an appointment.   Follow up with pcp with 3 days of discharge.  Follow up with vascular cardiology , please call to make an appointment

## 2021-05-14 NOTE — DISCHARGE NOTE PROVIDER - CARE PROVIDERS DIRECT ADDRESSES
[email or web address]

## 2021-05-14 NOTE — DISCHARGE NOTE PROVIDER - HOSPITAL COURSE
Patient is a 51 year old female with PMHx of  x2 presenting with worsening abd pain and admitted for Abdominal pain. Ct scan Normal CT abd/pelvis on 2021Mesenteric vascular duplex 21 could not exclude MALS- median arcuate ligament syndrome; Gastroenterologist, vascular and GI surgery consulted. Patient s/p EGD  showed nonsevere esophagitis, stomach/duodenal (both biopsied). Gi surgery and gastroenterology cleared patient for follow up outpatient. Dr Cerna cleared patient for discharge home.

## 2021-05-14 NOTE — PRE PROCEDURE NOTE - PRE PROCEDURE EVALUATION
Attending Physician:   Estelle                         Procedure: EGD    Indication for Procedure: Abd pain  ________________________________________________________  PAST MEDICAL & SURGICAL HISTORY:  Arthritis    H/O:  section  x 2      ALLERGIES:  amoxicillin (Hives)  sulfa drugs (Hives)    HOME MEDICATIONS:    AICD/PPM: [ ] yes   [ ] no    PERTINENT LAB DATA:                        12.6   8.70  )-----------( 311      ( 14 May 2021 06:44 )             38.7     05-14    140  |  106  |  9   ----------------------------<  93  3.9   |  23  |  0.84    Ca    8.6      14 May 2021 06:44                  PHYSICAL EXAMINATION:    Height (cm): 158.8  Weight (kg): 93  BMI (kg/m2): 36.9  BSA (m2): 1.94T(C): 36.2  HR: 73  BP: 111/71  RR: 16  SpO2: 97%    Constitutional: NAD  HEENT: PERRLA, EOMI,    Neck:  No JVD  Respiratory: CTAB/L  Cardiovascular: S1 and S2  Gastrointestinal: BS+, soft, NT/ND  Extremities: No peripheral edema  Neurological: A/O x 3, no focal deficits  Psychiatric: Normal mood, normal affect  Skin: No rashes    ASA Class: I [ ]  II [ ]  III [ ]  IV [ ]    COMMENTS:    The patient is a suitable candidate for the planned procedure unless box checked [ ]  No, explain:

## 2021-05-14 NOTE — PRE-ANESTHESIA EVALUATION ADULT - NSANTHOSAYNRD_GEN_A_CORE
No. CHRIS screening performed.  STOP BANG Legend: 0-2 = LOW Risk; 3-4 = INTERMEDIATE Risk; 5-8 = HIGH Risk Yes

## 2021-05-14 NOTE — PROGRESS NOTE ADULT - ASSESSMENT
50yo F with h/o c sections x2 presenting with 3 weeks of post-prandial abdominal pain. Mesenteric duplex concerning for MALS. Additional work up unrevealing otherwise, to this point.     Plan:  - Plan for EGD tomorrow.   - Recommend CTA abdomen to eval celiac artery  - Will continue to follow    Green Team Surgery  [id number]      50yo F with h/o c sections x2 presenting with 3 weeks of post-prandial abdominal pain. Mesenteric duplex concerning for MALS. Additional work up unrevealing otherwise, to this point.     Plan:  - Plan for EGD tomorrow.   - Recommend CTA abdomen to eval celiac artery  - Will continue to follow  - Patient may follow up outpatient with Dr. Blancas after completion of work up.     Discussed with Dr. Blancas.    [place] [facility] Surgery  [id number]

## 2021-05-14 NOTE — DISCHARGE NOTE PROVIDER - PROVIDER TOKENS
PROVIDER:[TOKEN:[[id number]]],PROVIDER:[TOKEN:[[id number]]],PROVIDER:[TOKEN:[[id number]]]

## 2021-05-14 NOTE — PROGRESS NOTE ADULT - ASSESSMENT
Patient is a 51 year old female with PMHx of  x2 presenting with worsening abd pain "like someone punched me in the stomach" + left sided back pain and decreased PO intake x 3 weeks. Patient also notes being pale appearing and a temperature of 99.6 this past weekend. She endorses pain after eating and has not been able to tolerate anything but crackers and tea. Patient has had 2 outpatient CT scans in the past month showing diverticulosis without diverticulitis but GI prescribed her cipro/ flagyl as symptoms were not improving. Patient also admits to taking Motrin over the past few months, confirms loose stools without blood prior to abx use as well. Abd soft, nondistended, mildly tender in LLQ.     # Abd Pain  Possibly due to median arcuate ligament syndrome  Normal CT abd/pelvis on 2021  Hold off abx for now  Low fiber diet  NO MOTRIN FOR PAIN  Pain control - Tylenol 650MG Q6H PRN for mild pain, Tramadol 25MG Q6H PRN for moderate pain  Follow up stool samples   MR entero r/o small bowel IBD -->nl  Mesenteric vascular duplex 21 --> could not exclude MALS  EGD  showed nonsevere esophagitis, nl stomach/duodenal (both biopsied)  CTA abd  -->  Vascular consult appreciated  Appreciate GI    # Disposition  Possible d/c home today with vascular f/u depending on CTA

## 2021-05-14 NOTE — DISCHARGE NOTE NURSING/CASE MANAGEMENT/SOCIAL WORK - NSDCFUADDAPPT_GEN_ALL_CORE_FT
Duration Of Freeze Thaw-Cycle (Seconds): 3 Post-Care Instructions: I reviewed with the patient in detail post-care instructions. Patient is to wear sunprotection, and avoid picking at any of the treated lesions. Pt may apply Vaseline to crusted or scabbing areas. Detail Level: Detailed Consent: The patient's consent was obtained including but not limited to risks of crusting, scabbing, blistering, scarring, darker or lighter pigmentary change, recurrence, incomplete removal and infection. Render Post-Care Instructions In Note?: yes Number Of Freeze-Thaw Cycles: 2 freeze-thaw cycles Follow up with gastroenterologist for biopsies from EGD.   Please follow up with Dr. Blancas as an outpatient- call to make an appointment.   Follow up with pcp with 3 days of discharge.  Follow up with vascular cardiology , please call to make an appointment

## 2021-05-14 NOTE — DISCHARGE NOTE NURSING/CASE MANAGEMENT/SOCIAL WORK - PATIENT PORTAL LINK FT
You can access the FollowMyHealth Patient Portal offered by [facility] by registering at the following website: [email or web address]. By joining [employer]’s FollowMyHealth portal, you will also be able to view your health information using other applications (apps) compatible with our system.

## 2021-05-14 NOTE — DISCHARGE NOTE PROVIDER - NSDCCPCAREPLAN_GEN_ALL_CORE_FT
PRINCIPAL DISCHARGE DIAGNOSIS  Diagnosis: Left upper quadrant abdominal pain  Assessment and Plan of Treatment: Resolved      SECONDARY DISCHARGE DIAGNOSES  Diagnosis: Median arcuate ligament syndrome  Assessment and Plan of Treatment: MALS is a condition in which the median arcuate ligament presses too tightly on the celiac artery (a major branch of the aorta that delivers blood to stomach liver and other organs)  and the nerves in the area.    Diagnosis: Cold sore  Assessment and Plan of Treatment: A cold sore is a group of tiny, painful blisters caused by Herpes Simplex virus(HSV).  They are also called fever blister or herpe simplex. Continue nystatin solution and valtres as ordered

## 2021-05-14 NOTE — DISCHARGE NOTE PROVIDER - NSDCMRMEDTOKEN_GEN_ALL_CORE_FT
acetaminophen 325 mg oral tablet: 2 tab(s) orally every 6 hours, As needed, Mild Pain (1 - 3)  citalopram 20 mg oral tablet: 1 tab(s) orally once a day  gabapentin 600 mg oral tablet: 1 tab(s) orally once a day MDD:1  lactobacillus acidophilus oral capsule: 1 cap(s) orally 3 times a day (with meals)   loratadine 10 mg oral tablet: 1 tab(s) orally once a day  nystatin 100,000 units/mL oral suspension: 5 milliliter(s) orally every 6 hours  traMADol 50 mg oral tablet: 0.5 tab(s) orally every 6 hours, As needed, Moderate Pain (4 - 6) MDD:1  valACYclovir 1 g oral tablet: 1 tab(s) orally once a day

## 2021-05-14 NOTE — DISCHARGE NOTE PROVIDER - CARE PROVIDER_API CALL
Porter Grey  GASTROENTEROLOGY  [address], [address]  [place] [address]  Phone: [phone]  Fax: [phone]  Follow Up Time:     Tremaine Fiore (DO)  Cardiovascular Disease; Internal Medicine; Nuclear Cardiology  [address] [place] [address]  Phone: [phone]  Fax: [phone]  Follow Up Time:     Cheo Blancas  General Surgery  [address] [place] [address]  Phone: [phone]  Fax: [phone]  Follow Up Time:

## 2021-05-19 LAB — LACTOFERRIN STL-MCNC: <1 — SIGNIFICANT CHANGE UP (ref 0–7.24)

## 2021-05-21 ENCOUNTER — APPOINTMENT (OUTPATIENT)
Dept: SURGERY | Facility: CLINIC | Age: 51
End: 2021-05-21
Payer: COMMERCIAL

## 2021-05-21 VITALS
DIASTOLIC BLOOD PRESSURE: 79 MMHG | RESPIRATION RATE: 16 BRPM | WEIGHT: 200 LBS | OXYGEN SATURATION: 99 % | SYSTOLIC BLOOD PRESSURE: 117 MMHG | TEMPERATURE: 97.5 F | BODY MASS INDEX: 36.8 KG/M2 | HEART RATE: 80 BPM | HEIGHT: 62 IN

## 2021-05-21 DIAGNOSIS — Z87.09 PERSONAL HISTORY OF OTHER DISEASES OF THE RESPIRATORY SYSTEM: ICD-10-CM

## 2021-05-21 PROCEDURE — 99204 OFFICE O/P NEW MOD 45 MIN: CPT

## 2021-05-21 PROCEDURE — 99072 ADDL SUPL MATRL&STAF TM PHE: CPT

## 2021-05-21 RX ORDER — ASCORBIC ACID 500 MG
TABLET ORAL
Refills: 0 | Status: ACTIVE | COMMUNITY

## 2021-05-21 RX ORDER — BACILLUS COAGULANS/INULIN 1B-250 MG
CAPSULE ORAL
Refills: 0 | Status: ACTIVE | COMMUNITY

## 2021-05-21 RX ORDER — PSYLLIUM HUSK 0.4 G
CAPSULE ORAL
Refills: 0 | Status: ACTIVE | COMMUNITY

## 2021-05-21 RX ORDER — SACCHAROMYCES BOULARDII 50 MG
CAPSULE ORAL
Refills: 0 | Status: ACTIVE | COMMUNITY

## 2021-05-21 RX ORDER — B-COMPLEX WITH VITAMIN C
TABLET ORAL
Refills: 0 | Status: ACTIVE | COMMUNITY

## 2021-05-21 RX ORDER — CETIRIZINE HYDROCHLORIDE 10 MG/1
CAPSULE, LIQUID FILLED ORAL
Refills: 0 | Status: ACTIVE | COMMUNITY

## 2021-05-21 RX ORDER — COLD-HOT PACK
EACH MISCELLANEOUS
Refills: 0 | Status: ACTIVE | COMMUNITY

## 2021-05-21 NOTE — HISTORY OF PRESENT ILLNESS
[de-identified] : 52yo F presenting for follow up after initial consultation in the hospital. She was recently admitted for post-prandial abdominal pain with any solid food intake. She has been able to tolerate liquids then and since discharge, but food intake results in severe epigastric and left upper abdominal pain. She also feels drained and flushed in association with the pain, which can last up to an hour after eating. She denies nausea/vomiting or constipation.  She denies bloating.\par \par While in the hospital she had an extensive work up including mesenteric duplex (increased flow velocities in celiac at end expiration), MRE (normal bowel), CTA (minimal stenosis of celiac artery, limited as adynamic study), EGD (normal).

## 2021-05-21 NOTE — ASSESSMENT
[FreeTextEntry1] : 51F with post prandial abdominal pain concerning for MALS vs biliary colic. \par \par We will obtain right upper quadrant ultrasound.  If there are gallstones, the gallbladder should be removed, and I discussed with her the risks and benefits of laparoscopic cholecystectomy.\par \par We will also order an MRA, which may give us a better picture if this is truly median arcuate ligament syndrome and celiac stenosis can be visualized post expiration.  I explained to her that in most cases, median arcuate ligament syndrome can be difficult to definitively diagnose.  I have also discussed with her the risks, benefits, and expected postoperative course of robotic assisted laparoscopic median arcuate ligament release.  I have explained there is a not insignificant risk that she may have persistent symptoms despite the surgery.  For this reason, it is important to rule out all other possible causes of this postprandial pain.\par \par All questions were answered and she agrees with the above described plan.  Both her parents were present during this visit and had all of their questions answered satisfactorily.\par

## 2021-05-21 NOTE — PLAN
[FreeTextEntry1] : - MRA \par - RUQ sono to eval for cholelithiasis\par - Follow up after completion of tests. \par -If right upper quadrant ultrasound is normal and does not show cholelithiasis, will proceed to schedule robotic assisted laparoscopic median arcuate ligament release.  If there are gallstones on the ultrasound, we will do laparoscopic cholecystectomy first.

## 2021-05-21 NOTE — PHYSICAL EXAM
[Normal Breath Sounds] : Normal breath sounds [Normal Heart Sounds] : normal heart sounds [No Rash or Lesion] : No rash or lesion [Alert] : alert [Oriented to Person] : oriented to person [Oriented to Place] : oriented to place [Oriented to Time] : oriented to time [Anxious] : anxious [de-identified] : NAD  [de-identified] : Soft, nondistended, nontender  [de-identified] : No deformities

## 2021-05-21 NOTE — CONSULT LETTER
[Dear  ___] : Dear  [unfilled], [Consult Letter:] : I had the pleasure of evaluating your patient, [unfilled]. [Please see my note below.] : Please see my note below. [Consult Closing:] : Thank you very much for allowing me to participate in the care of this patient.  If you have any questions, please do not hesitate to contact me. [Sincerely,] : Sincerely, [FreeTextEntry2] : Dr. Derick Mccabe [FreeTextEntry3] : Cheo Blancas MD, FACS, FASMBS\par [facility]\par [address]\[place] [address]\par tel. [phone]\par fax [phone]\par  \par

## 2021-05-22 ENCOUNTER — APPOINTMENT (OUTPATIENT)
Dept: ULTRASOUND IMAGING | Facility: CLINIC | Age: 51
End: 2021-05-22
Payer: COMMERCIAL

## 2021-05-22 ENCOUNTER — OUTPATIENT (OUTPATIENT)
Dept: OUTPATIENT SERVICES | Facility: HOSPITAL | Age: 51
LOS: 1 days | End: 2021-05-22
Payer: COMMERCIAL

## 2021-05-22 DIAGNOSIS — Z98.891 HISTORY OF UTERINE SCAR FROM PREVIOUS SURGERY: Chronic | ICD-10-CM

## 2021-05-22 DIAGNOSIS — Z00.8 ENCOUNTER FOR OTHER GENERAL EXAMINATION: ICD-10-CM

## 2021-05-22 PROCEDURE — 76705 ECHO EXAM OF ABDOMEN: CPT | Mod: 26

## 2021-05-22 PROCEDURE — 76705 ECHO EXAM OF ABDOMEN: CPT

## 2021-05-28 ENCOUNTER — APPOINTMENT (OUTPATIENT)
Dept: MRI IMAGING | Facility: IMAGING CENTER | Age: 51
End: 2021-05-28

## 2021-06-04 ENCOUNTER — NON-APPOINTMENT (OUTPATIENT)
Age: 51
End: 2021-06-04

## 2021-06-04 ENCOUNTER — APPOINTMENT (OUTPATIENT)
Dept: CARDIOLOGY | Facility: CLINIC | Age: 51
End: 2021-06-04
Payer: COMMERCIAL

## 2021-06-04 VITALS — DIASTOLIC BLOOD PRESSURE: 87 MMHG | SYSTOLIC BLOOD PRESSURE: 133 MMHG

## 2021-06-04 VITALS
OXYGEN SATURATION: 98 % | HEIGHT: 62 IN | WEIGHT: 196 LBS | DIASTOLIC BLOOD PRESSURE: 84 MMHG | BODY MASS INDEX: 36.07 KG/M2 | SYSTOLIC BLOOD PRESSURE: 129 MMHG | HEART RATE: 64 BPM

## 2021-06-04 PROCEDURE — 99072 ADDL SUPL MATRL&STAF TM PHE: CPT

## 2021-06-04 PROCEDURE — 93000 ELECTROCARDIOGRAM COMPLETE: CPT

## 2021-06-04 PROCEDURE — 99204 OFFICE O/P NEW MOD 45 MIN: CPT

## 2021-06-04 NOTE — ASSESSMENT
[FreeTextEntry1] : 51 year old female presents for cardiac clearance prior to surgery for median arcuate ligament sx.\par echocardiogram for structural assessment.\par pt has arthritic issues of knees and may have difficulty with treadmill.  would order pharm stress test to assess for cardiac ischemia.

## 2021-06-04 NOTE — CARDIOLOGY SUMMARY
[de-identified] : Sinus  Rhythm \par PRWP\par  -  Negative precordial T-waves. \par \par ABNORMAL

## 2021-06-04 NOTE — REASON FOR VISIT
[Symptom and Test Evaluation] : symptom and test evaluation [Other: ____] : [unfilled] [FreeTextEntry1] : Patient is a 51 year old female  who will require surgery for celiac artery obstruction.  Imaging reviewed.  Her surgery has not been scheduled but will be open abdominal procedure\par Denies htn, diabetes, elevated cholesterol.  Hx of Graves disease which she reports is in remission.  \par No cardiac testing in the last 10 years.\par non smoker, no alcohol, no drug use.\par hx of mild asthma\par arthritis in knees\par no outside records provided

## 2021-06-04 NOTE — DISCUSSION/SUMMARY
[Patient] : the patient [___ Month(s)] : in [unfilled] month(s) [FreeTextEntry4] : pre surgical assessment of cardiovascilar risk [FreeTextEntry1] : 51 year old female presents for cardiac clearance prior to surgery for median arcuate ligament sx.\par echocardiogram for structural assessment.\par pt has arthritic issues of knees and may have difficulty with treadmill.  would order pharm stress test to assess for cardiac ischemia.

## 2021-06-04 NOTE — HISTORY OF PRESENT ILLNESS
[FreeTextEntry1] : \par \par Patient is a 51 year old who will require surgery for celiac artery obstruction.  Imaging reviewed.  Her surgery has not been scheduled.  \par Denies htn, diabetes, elevated cholesterol.  Hx of Graves disease which she reports is in remission.  \par No cardiac testing in the last 10 years.\par non smoker, no alcohol, no drug use.\par hx of mild asthma\par no functional limitations.

## 2021-06-14 ENCOUNTER — APPOINTMENT (OUTPATIENT)
Dept: CV DIAGNOSITCS | Facility: HOSPITAL | Age: 51
End: 2021-06-14

## 2021-06-14 ENCOUNTER — OUTPATIENT (OUTPATIENT)
Dept: OUTPATIENT SERVICES | Facility: HOSPITAL | Age: 51
LOS: 1 days | End: 2021-06-14
Payer: COMMERCIAL

## 2021-06-14 DIAGNOSIS — Z00.00 ENCOUNTER FOR GENERAL ADULT MEDICAL EXAMINATION WITHOUT ABNORMAL FINDINGS: ICD-10-CM

## 2021-06-14 DIAGNOSIS — Z98.891 HISTORY OF UTERINE SCAR FROM PREVIOUS SURGERY: Chronic | ICD-10-CM

## 2021-06-14 DIAGNOSIS — I77.4 CELIAC ARTERY COMPRESSION SYNDROME: ICD-10-CM

## 2021-06-14 PROCEDURE — 93306 TTE W/DOPPLER COMPLETE: CPT | Mod: 26

## 2021-06-16 ENCOUNTER — OUTPATIENT (OUTPATIENT)
Dept: OUTPATIENT SERVICES | Facility: HOSPITAL | Age: 51
LOS: 1 days | End: 2021-06-16
Payer: COMMERCIAL

## 2021-06-16 ENCOUNTER — APPOINTMENT (OUTPATIENT)
Dept: CV DIAGNOSTICS | Facility: HOSPITAL | Age: 51
End: 2021-06-16

## 2021-06-16 DIAGNOSIS — Z98.891 HISTORY OF UTERINE SCAR FROM PREVIOUS SURGERY: Chronic | ICD-10-CM

## 2021-06-16 DIAGNOSIS — I77.4 CELIAC ARTERY COMPRESSION SYNDROME: ICD-10-CM

## 2021-06-16 PROCEDURE — 93018 CV STRESS TEST I&R ONLY: CPT

## 2021-06-16 PROCEDURE — 93016 CV STRESS TEST SUPVJ ONLY: CPT

## 2021-06-16 PROCEDURE — 93017 CV STRESS TEST TRACING ONLY: CPT

## 2021-06-16 PROCEDURE — 78452 HT MUSCLE IMAGE SPECT MULT: CPT | Mod: 26

## 2021-06-16 PROCEDURE — A9500: CPT

## 2021-06-16 PROCEDURE — 78452 HT MUSCLE IMAGE SPECT MULT: CPT

## 2021-06-21 ENCOUNTER — APPOINTMENT (OUTPATIENT)
Dept: CARDIOLOGY | Facility: CLINIC | Age: 51
End: 2021-06-21
Payer: COMMERCIAL

## 2021-06-21 ENCOUNTER — NON-APPOINTMENT (OUTPATIENT)
Age: 51
End: 2021-06-21

## 2021-06-21 VITALS
HEART RATE: 83 BPM | HEIGHT: 62 IN | WEIGHT: 196 LBS | DIASTOLIC BLOOD PRESSURE: 83 MMHG | SYSTOLIC BLOOD PRESSURE: 120 MMHG | BODY MASS INDEX: 36.07 KG/M2 | OXYGEN SATURATION: 99 %

## 2021-06-21 DIAGNOSIS — E01.0 IODINE-DEFICIENCY RELATED DIFFUSE (ENDEMIC) GOITER: ICD-10-CM

## 2021-06-21 DIAGNOSIS — Z82.5 FAMILY HISTORY OF ASTHMA AND OTHER CHRONIC LOWER RESPIRATORY DISEASES: ICD-10-CM

## 2021-06-21 DIAGNOSIS — G47.30 SLEEP APNEA, UNSPECIFIED: ICD-10-CM

## 2021-06-21 DIAGNOSIS — Z84.1 FAMILY HISTORY OF DISORDERS OF KIDNEY AND URETER: ICD-10-CM

## 2021-06-21 DIAGNOSIS — G56.00 CARPAL TUNNEL SYNDROME, UNSPECIFIED UPPER LIMB: ICD-10-CM

## 2021-06-21 DIAGNOSIS — K21.9 GASTRO-ESOPHAGEAL REFLUX DISEASE W/OUT ESOPHAGITIS: ICD-10-CM

## 2021-06-21 DIAGNOSIS — F39 UNSPECIFIED MOOD [AFFECTIVE] DISORDER: ICD-10-CM

## 2021-06-21 PROCEDURE — 93000 ELECTROCARDIOGRAM COMPLETE: CPT

## 2021-06-21 PROCEDURE — 99214 OFFICE O/P EST MOD 30 MIN: CPT

## 2021-06-21 PROCEDURE — 99072 ADDL SUPL MATRL&STAF TM PHE: CPT

## 2021-06-21 RX ORDER — ALBUTEROL SULFATE 108 UG/1
108 (90 BASE) AEROSOL, METERED RESPIRATORY (INHALATION) EVERY 4 HOURS
Refills: 0 | Status: ACTIVE | COMMUNITY

## 2021-06-21 RX ORDER — FLUTICASONE PROPIONATE AND SALMETEROL 50; 500 UG/1; UG/1
POWDER RESPIRATORY (INHALATION)
Refills: 0 | Status: DISCONTINUED | COMMUNITY
End: 2021-06-21

## 2021-06-21 NOTE — HISTORY OF PRESENT ILLNESS
[FreeTextEntry1] : \par \par Patient is a 51 year old who will require surgery for celiac artery obstruction.  Imaging reviewed.  Her surgery has not been scheduled.  \par Denies htn, diabetes, elevated cholesterol.  Hx of Graves disease which she reports is in remission.  \par No cardiac testing in the last 10 years.\par non smoker, no alcohol, no drug use.\par hx of mild asthma\par no functional limitations.\par \par 6/21/2021  testing reviewed.  echo with normal EF and no valvuar disease.  nuclear images appear to correct with prone imaging.  and normal LV function.  surgery now scheduled for July1st...

## 2021-06-21 NOTE — DISCUSSION/SUMMARY
[___ Month(s)] : in [unfilled] month(s) [FreeTextEntry4] : pre op testing [FreeTextEntry1] : 51 year old female planning for surgery to relieve external obstruction of celiac artery.  Normal LV function and no evidence of ischemia on stress testing,  No cardiac contra intra indication to proceeding.with surgery.  would follow up with patient 1 month post op to check on recovery,.

## 2021-06-22 ENCOUNTER — OUTPATIENT (OUTPATIENT)
Dept: OUTPATIENT SERVICES | Facility: HOSPITAL | Age: 51
LOS: 1 days | End: 2021-06-22
Payer: COMMERCIAL

## 2021-06-22 VITALS
OXYGEN SATURATION: 99 % | TEMPERATURE: 98 F | RESPIRATION RATE: 17 BRPM | DIASTOLIC BLOOD PRESSURE: 88 MMHG | SYSTOLIC BLOOD PRESSURE: 121 MMHG | HEART RATE: 83 BPM | WEIGHT: 190.04 LBS | HEIGHT: 62 IN

## 2021-06-22 DIAGNOSIS — R10.84 GENERALIZED ABDOMINAL PAIN: ICD-10-CM

## 2021-06-22 DIAGNOSIS — Z01.818 ENCOUNTER FOR OTHER PREPROCEDURAL EXAMINATION: ICD-10-CM

## 2021-06-22 DIAGNOSIS — Z87.898 PERSONAL HISTORY OF OTHER SPECIFIED CONDITIONS: ICD-10-CM

## 2021-06-22 DIAGNOSIS — Z29.9 ENCOUNTER FOR PROPHYLACTIC MEASURES, UNSPECIFIED: ICD-10-CM

## 2021-06-22 DIAGNOSIS — I77.4 CELIAC ARTERY COMPRESSION SYNDROME: ICD-10-CM

## 2021-06-22 DIAGNOSIS — Z86.39 PERSONAL HISTORY OF OTHER ENDOCRINE, NUTRITIONAL AND METABOLIC DISEASE: Chronic | ICD-10-CM

## 2021-06-22 DIAGNOSIS — G47.33 OBSTRUCTIVE SLEEP APNEA (ADULT) (PEDIATRIC): ICD-10-CM

## 2021-06-22 DIAGNOSIS — Z98.891 HISTORY OF UTERINE SCAR FROM PREVIOUS SURGERY: Chronic | ICD-10-CM

## 2021-06-22 DIAGNOSIS — Z98.890 OTHER SPECIFIED POSTPROCEDURAL STATES: Chronic | ICD-10-CM

## 2021-06-22 LAB
A1C WITH ESTIMATED AVERAGE GLUCOSE RESULT: 5.4 % — SIGNIFICANT CHANGE UP (ref 4–5.6)
ANION GAP SERPL CALC-SCNC: 16 MMOL/L — SIGNIFICANT CHANGE UP (ref 5–17)
BLD GP AB SCN SERPL QL: NEGATIVE — SIGNIFICANT CHANGE UP
BUN SERPL-MCNC: 8 MG/DL — SIGNIFICANT CHANGE UP (ref 7–23)
CALCIUM SERPL-MCNC: 9.5 MG/DL — SIGNIFICANT CHANGE UP (ref 8.4–10.5)
CHLORIDE SERPL-SCNC: 101 MMOL/L — SIGNIFICANT CHANGE UP (ref 96–108)
CO2 SERPL-SCNC: 20 MMOL/L — LOW (ref 22–31)
CREAT SERPL-MCNC: 0.76 MG/DL — SIGNIFICANT CHANGE UP (ref 0.5–1.3)
ESTIMATED AVERAGE GLUCOSE: 108 MG/DL — SIGNIFICANT CHANGE UP (ref 68–114)
GLUCOSE SERPL-MCNC: 77 MG/DL — SIGNIFICANT CHANGE UP (ref 70–99)
HCT VFR BLD CALC: 42.7 % — SIGNIFICANT CHANGE UP (ref 34.5–45)
HGB BLD-MCNC: 13.7 G/DL — SIGNIFICANT CHANGE UP (ref 11.5–15.5)
MCHC RBC-ENTMCNC: 30 PG — SIGNIFICANT CHANGE UP (ref 27–34)
MCHC RBC-ENTMCNC: 32.1 GM/DL — SIGNIFICANT CHANGE UP (ref 32–36)
MCV RBC AUTO: 93.4 FL — SIGNIFICANT CHANGE UP (ref 80–100)
NRBC # BLD: 0 /100 WBCS — SIGNIFICANT CHANGE UP (ref 0–0)
PLATELET # BLD AUTO: 367 K/UL — SIGNIFICANT CHANGE UP (ref 150–400)
POTASSIUM SERPL-MCNC: 3.8 MMOL/L — SIGNIFICANT CHANGE UP (ref 3.5–5.3)
POTASSIUM SERPL-SCNC: 3.8 MMOL/L — SIGNIFICANT CHANGE UP (ref 3.5–5.3)
RBC # BLD: 4.57 M/UL — SIGNIFICANT CHANGE UP (ref 3.8–5.2)
RBC # FLD: 12.9 % — SIGNIFICANT CHANGE UP (ref 10.3–14.5)
RH IG SCN BLD-IMP: POSITIVE — SIGNIFICANT CHANGE UP
SODIUM SERPL-SCNC: 137 MMOL/L — SIGNIFICANT CHANGE UP (ref 135–145)
WBC # BLD: 7.61 K/UL — SIGNIFICANT CHANGE UP (ref 3.8–10.5)
WBC # FLD AUTO: 7.61 K/UL — SIGNIFICANT CHANGE UP (ref 3.8–10.5)

## 2021-06-22 PROCEDURE — 86900 BLOOD TYPING SEROLOGIC ABO: CPT

## 2021-06-22 PROCEDURE — 83036 HEMOGLOBIN GLYCOSYLATED A1C: CPT

## 2021-06-22 PROCEDURE — 85027 COMPLETE CBC AUTOMATED: CPT

## 2021-06-22 PROCEDURE — 86901 BLOOD TYPING SEROLOGIC RH(D): CPT

## 2021-06-22 PROCEDURE — G0463: CPT

## 2021-06-22 PROCEDURE — 80048 BASIC METABOLIC PNL TOTAL CA: CPT

## 2021-06-22 PROCEDURE — 86850 RBC ANTIBODY SCREEN: CPT

## 2021-06-22 RX ORDER — SODIUM CHLORIDE 9 MG/ML
3 INJECTION INTRAMUSCULAR; INTRAVENOUS; SUBCUTANEOUS EVERY 8 HOURS
Refills: 0 | Status: DISCONTINUED | OUTPATIENT
Start: 2021-07-01 | End: 2021-07-01

## 2021-06-22 RX ORDER — LIDOCAINE HCL 20 MG/ML
0.2 VIAL (ML) INJECTION ONCE
Refills: 0 | Status: DISCONTINUED | OUTPATIENT
Start: 2021-07-01 | End: 2021-07-01

## 2021-06-22 RX ORDER — CIPROFLOXACIN LACTATE 400MG/40ML
400 VIAL (ML) INTRAVENOUS ONCE
Refills: 0 | Status: DISCONTINUED | OUTPATIENT
Start: 2021-07-01 | End: 2021-07-01

## 2021-06-22 RX ORDER — VANCOMYCIN HCL 1 G
1250 VIAL (EA) INTRAVENOUS ONCE
Refills: 0 | Status: DISCONTINUED | OUTPATIENT
Start: 2021-07-01 | End: 2021-07-01

## 2021-06-22 RX ORDER — CHLORHEXIDINE GLUCONATE 213 G/1000ML
1 SOLUTION TOPICAL ONCE
Refills: 0 | Status: DISCONTINUED | OUTPATIENT
Start: 2021-07-01 | End: 2021-07-01

## 2021-06-22 NOTE — H&P PST ADULT - HISTORY OF PRESENT ILLNESS
49 y/o female with hx of postprandial abdominal pain with solid food (able to tolerate liquids).  She was admitted from 5/10/21 to 5/14/21 with worsening abdominal pain and she was unable to tolerate solid food.  She reports 40lb weight loss.  Since discharge she is tolerating liquids, but is still unable to tolerate solids.  She complains of bloating, flatus and soft bowel movements.  She denies any nausea, vomiting, diarrhea, constipation.  She is scheduled for robotic assisted laparoscopic median arcuate ligament release on 7/1/21.    She is scheduled for preop Covid swab on 6/29/21.  51 y/o female with hx of postprandial abdominal pain with solid food (able to tolerate liquids).  PMHx includes Graves disease s/p Methamizone, now Euthyroid, asthma, CHRIS (uses dental apparatus), arthritis and neuropathy to lower extremities  She was admitted from 5/10/21 to 5/14/21 with worsening abdominal pain and inability to tolerate solid food.  Since discharge she is tolerating liquids, but is still unable to tolerate solids.  She reports 40lb weight loss over the past 2 months.  She complains of bloating, flatus and soft bowel movements.  She denies any nausea, vomiting, diarrhea, constipation.  She is scheduled for robotic assisted laparoscopic median arcuate ligament release on 7/1/21.    She is scheduled for preop Covid swab on 6/29/21.

## 2021-06-22 NOTE — H&P PST ADULT - NSICDXPROBLEM_GEN_ALL_CORE_FT
PROBLEM DIAGNOSES  Problem: H/O abdominal pain  Assessment and Plan: Pt. is scheduled for robotic assisted laparoscopic median arcuate ligament release on 7/1/21.  Preop instructions reviewed, pt verbalized understanding.  Preop labs drawn (CBC, BMP, HGBA1C, T & S).  Pt. instructed to obtain medical/cardiac clearance prior to surgery.  Pt. is scheduled for preop Covid swab on 6/29/21.    Problem: Need for prophylactic measure  Assessment and Plan: The Caprini score indicates this patient is at risk for a VTE event (score 3-5).  Most surgical patients in this group would benefit from pharmacologic prophylaxis.  The surgical team will determine the balance between VTE risk and bleeding risk     Problem: CHRIS (obstructive sleep apnea)  Assessment and Plan: OR booking notified of history of CHRIS.

## 2021-06-22 NOTE — H&P PST ADULT - NSICDXPASTMEDICALHX_GEN_ALL_CORE_FT
PAST MEDICAL HISTORY:  Arthritis     Asthma well controlled, denies hospitalizations/intubations    Cystic fibrosis carrier     Frequent sinus infections     Graves disease tx'd with Methamizole- completed several years ago, now Euthyroid    H/O abdominal pain     H/O carpal tunnel syndrome     H/O neuropathy controlled on Gabapentin    History of 2019 novel coronavirus disease (COVID-19) January 2021- did not require hospitalization/oxygen    Seasonal allergies      PAST MEDICAL HISTORY:  Arthritis     Asthma well controlled, denies hospitalizations/intubations    Cystic fibrosis carrier     Frequent sinus infections     Graves disease tx'd with Methamizole- completed several years ago, now Euthyroid    H/O abdominal pain     H/O abnormal weight loss over past 2 months lost 40lbs since she cannot tolerate solid food    H/O carpal tunnel syndrome     H/O neuropathy controlled on Gabapentin    History of 2019 novel coronavirus disease (COVID-19) January 2021- did not require hospitalization/oxygen    CHRIS (obstructive sleep apnea) rx'd dental apparatus    Seasonal allergies

## 2021-06-22 NOTE — H&P PST ADULT - ASSESSMENT
SANTII VTE 2.0 SCORE [CLOT updated 2019]    AGE RELATED RISK FACTORS                                                       MOBILITY RELATED FACTORS  [x ] Age 41-60 years                                            (1 Point)                    [ ] Bed rest                                                        (1 Point)  [ ] Age: 61-74 years                                           (2 Points)                  [ ] Plaster cast                                                   (2 Points)  [ ] Age= 75 years                                              (3 Points)                    [ ] Bed bound for more than 72 hours                 (2 Points)    DISEASE RELATED RISK FACTORS                                               GENDER SPECIFIC FACTORS  [ ] Edema in the lower extremities                       (1 Point)              [ ] Pregnancy                                                     (1 Point)  [ ] Varicose veins                                               (1 Point)                     [ ] Post-partum < 6 weeks                                   (1 Point)             [x ] BMI > 25 Kg/m2                                            (1 Point)                     [ ] Hormonal therapy  or oral contraception          (1 Point)                 [ ] Sepsis (in the previous month)                        (1 Point)               [ ] History of pregnancy complications                 (1 point)  [ ] Pneumonia or serious lung disease                                               [ ] Unexplained or recurrent                     (1 Point)           (in the previous month)                               (1 Point)  [ ] Abnormal pulmonary function test                     (1 Point)                 SURGERY RELATED RISK FACTORS  [ ] Acute myocardial infarction                              (1 Point)               [ ]  Section                                             (1 Point)  [ ] Congestive heart failure (in the previous month)  (1 Point)      [ ] Minor surgery                                                  (1 Point)   [ ] Inflammatory bowel disease                             (1 Point)               [ ] Arthroscopic surgery                                        (2 Points)  [ ] Central venous access                                      (2 Points)                [x ] General surgery lasting more than 45 minutes (2 points)  [ ] Malignancy- Present or previous                   (2 Points)                [ ] Elective arthroplasty                                         (5 points)    [ ] Stroke (in the previous month)                          (5 Points)                                                                                                                                                           HEMATOLOGY RELATED FACTORS                                                 TRAUMA RELATED RISK FACTORS  [ ] Prior episodes of VTE                                     (3 Points)                [ ] Fracture of the hip, pelvis, or leg                       (5 Points)  [ ] Positive family history for VTE                         (3 Points)             [ ] Acute spinal cord injury (in the previous month)  (5 Points)  [ ] Prothrombin 37534 A                                     (3 Points)               [ ] Paralysis  (less than 1 month)                             (5 Points)  [ ] Factor V Leiden                                             (3 Points)                  [ ] Multiple Trauma within 1 month                        (5 Points)  [ ] Lupus anticoagulants                                     (3 Points)                                                           [ ] Anticardiolipin antibodies                               (3 Points)                                                       [ ] High homocysteine in the blood                      (3 Points)                                             [ ] Other congenital or acquired thrombophilia      (3 Points)                                                [ ] Heparin induced thrombocytopenia                  (3 Points)                                     Total Score [     4     ]

## 2021-06-22 NOTE — H&P PST ADULT - NSICDXFAMILYHX_GEN_ALL_CORE_FT
FAMILY HISTORY:  Sibling  Still living? Yes, Estimated age: Age Unknown  FH: kidney cancer, Age at diagnosis: Age Unknown

## 2021-06-23 PROBLEM — E05.00 THYROTOXICOSIS WITH DIFFUSE GOITER WITHOUT THYROTOXIC CRISIS OR STORM: Chronic | Status: ACTIVE | Noted: 2021-06-22

## 2021-06-23 PROBLEM — G47.33 OBSTRUCTIVE SLEEP APNEA (ADULT) (PEDIATRIC): Chronic | Status: ACTIVE | Noted: 2021-06-22

## 2021-06-23 PROBLEM — Z86.69 PERSONAL HISTORY OF OTHER DISEASES OF THE NERVOUS SYSTEM AND SENSE ORGANS: Chronic | Status: ACTIVE | Noted: 2021-06-22

## 2021-06-23 PROBLEM — J30.2 OTHER SEASONAL ALLERGIC RHINITIS: Chronic | Status: ACTIVE | Noted: 2021-06-22

## 2021-06-23 PROBLEM — J45.909 UNSPECIFIED ASTHMA, UNCOMPLICATED: Chronic | Status: ACTIVE | Noted: 2021-06-22

## 2021-06-23 PROBLEM — Z87.898 PERSONAL HISTORY OF OTHER SPECIFIED CONDITIONS: Chronic | Status: ACTIVE | Noted: 2021-06-22

## 2021-06-23 PROBLEM — J32.9 CHRONIC SINUSITIS, UNSPECIFIED: Chronic | Status: ACTIVE | Noted: 2021-06-22

## 2021-06-23 PROBLEM — Z14.1 CYSTIC FIBROSIS CARRIER: Chronic | Status: ACTIVE | Noted: 2021-06-22

## 2021-06-23 PROBLEM — Z86.16 PERSONAL HISTORY OF COVID-19: Chronic | Status: ACTIVE | Noted: 2021-06-22

## 2021-06-28 ENCOUNTER — OUTPATIENT (OUTPATIENT)
Dept: OUTPATIENT SERVICES | Facility: HOSPITAL | Age: 51
LOS: 1 days | End: 2021-06-28
Payer: COMMERCIAL

## 2021-06-28 DIAGNOSIS — Z98.891 HISTORY OF UTERINE SCAR FROM PREVIOUS SURGERY: Chronic | ICD-10-CM

## 2021-06-28 DIAGNOSIS — Z11.52 ENCOUNTER FOR SCREENING FOR COVID-19: ICD-10-CM

## 2021-06-28 DIAGNOSIS — Z98.890 OTHER SPECIFIED POSTPROCEDURAL STATES: Chronic | ICD-10-CM

## 2021-06-28 LAB — SARS-COV-2 RNA SPEC QL NAA+PROBE: SIGNIFICANT CHANGE UP

## 2021-06-28 PROCEDURE — U0003: CPT

## 2021-06-28 PROCEDURE — U0005: CPT

## 2021-06-28 PROCEDURE — C9803: CPT

## 2021-06-30 ENCOUNTER — TRANSCRIPTION ENCOUNTER (OUTPATIENT)
Age: 51
End: 2021-06-30

## 2021-07-01 ENCOUNTER — INPATIENT (INPATIENT)
Facility: HOSPITAL | Age: 51
LOS: 3 days | Discharge: HOME | DRG: 357 | End: 2021-07-05
Attending: SURGERY | Admitting: SURGERY
Payer: COMMERCIAL

## 2021-07-01 ENCOUNTER — APPOINTMENT (OUTPATIENT)
Dept: SURGERY | Facility: HOSPITAL | Age: 51
End: 2021-07-01
Payer: COMMERCIAL

## 2021-07-01 VITALS
HEART RATE: 79 BPM | HEIGHT: 62 IN | WEIGHT: 190.04 LBS | DIASTOLIC BLOOD PRESSURE: 72 MMHG | OXYGEN SATURATION: 97 % | TEMPERATURE: 98 F | SYSTOLIC BLOOD PRESSURE: 106 MMHG | RESPIRATION RATE: 18 BRPM

## 2021-07-01 DIAGNOSIS — I77.4 CELIAC ARTERY COMPRESSION SYNDROME: ICD-10-CM

## 2021-07-01 DIAGNOSIS — Z98.890 OTHER SPECIFIED POSTPROCEDURAL STATES: Chronic | ICD-10-CM

## 2021-07-01 DIAGNOSIS — Z98.891 HISTORY OF UTERINE SCAR FROM PREVIOUS SURGERY: Chronic | ICD-10-CM

## 2021-07-01 DIAGNOSIS — R10.84 GENERALIZED ABDOMINAL PAIN: ICD-10-CM

## 2021-07-01 LAB
ANION GAP SERPL CALC-SCNC: 14 MMOL/L — SIGNIFICANT CHANGE UP (ref 5–17)
APTT BLD: 26.3 SEC — LOW (ref 27.5–35.5)
BUN SERPL-MCNC: 10 MG/DL — SIGNIFICANT CHANGE UP (ref 7–23)
CALCIUM SERPL-MCNC: 8.5 MG/DL — SIGNIFICANT CHANGE UP (ref 8.4–10.5)
CHLORIDE SERPL-SCNC: 102 MMOL/L — SIGNIFICANT CHANGE UP (ref 96–108)
CO2 SERPL-SCNC: 18 MMOL/L — LOW (ref 22–31)
CREAT SERPL-MCNC: 0.82 MG/DL — SIGNIFICANT CHANGE UP (ref 0.5–1.3)
GAS PNL BLDA: SIGNIFICANT CHANGE UP
GLUCOSE BLDC GLUCOMTR-MCNC: 105 MG/DL — HIGH (ref 70–99)
GLUCOSE SERPL-MCNC: 182 MG/DL — HIGH (ref 70–99)
HCG UR QL: NEGATIVE — SIGNIFICANT CHANGE UP
HCT VFR BLD CALC: 40 % — SIGNIFICANT CHANGE UP (ref 34.5–45)
HGB BLD-MCNC: 13 G/DL — SIGNIFICANT CHANGE UP (ref 11.5–15.5)
INR BLD: 1.1 RATIO — SIGNIFICANT CHANGE UP (ref 0.88–1.16)
MAGNESIUM SERPL-MCNC: 2 MG/DL — SIGNIFICANT CHANGE UP (ref 1.6–2.6)
MCHC RBC-ENTMCNC: 30 PG — SIGNIFICANT CHANGE UP (ref 27–34)
MCHC RBC-ENTMCNC: 32.5 GM/DL — SIGNIFICANT CHANGE UP (ref 32–36)
MCV RBC AUTO: 92.4 FL — SIGNIFICANT CHANGE UP (ref 80–100)
NRBC # BLD: 0 /100 WBCS — SIGNIFICANT CHANGE UP (ref 0–0)
PHOSPHATE SERPL-MCNC: 3.9 MG/DL — SIGNIFICANT CHANGE UP (ref 2.5–4.5)
PLATELET # BLD AUTO: 336 K/UL — SIGNIFICANT CHANGE UP (ref 150–400)
POTASSIUM SERPL-MCNC: 4.4 MMOL/L — SIGNIFICANT CHANGE UP (ref 3.5–5.3)
POTASSIUM SERPL-SCNC: 4.4 MMOL/L — SIGNIFICANT CHANGE UP (ref 3.5–5.3)
PROTHROM AB SERPL-ACNC: 13.1 SEC — SIGNIFICANT CHANGE UP (ref 10.6–13.6)
RBC # BLD: 4.33 M/UL — SIGNIFICANT CHANGE UP (ref 3.8–5.2)
RBC # FLD: 14 % — SIGNIFICANT CHANGE UP (ref 10.3–14.5)
SODIUM SERPL-SCNC: 134 MMOL/L — LOW (ref 135–145)
WBC # BLD: 15.07 K/UL — HIGH (ref 3.8–10.5)
WBC # FLD AUTO: 15.07 K/UL — HIGH (ref 3.8–10.5)

## 2021-07-01 PROCEDURE — 35091 REPAIR DEFECT OF ARTERY: CPT

## 2021-07-01 PROCEDURE — 35281 RPR BLVSL GR OT/TH VN NTR-AB: CPT | Mod: 82

## 2021-07-01 PROCEDURE — 74018 RADEX ABDOMEN 1 VIEW: CPT | Mod: 26

## 2021-07-01 PROCEDURE — 99233 SBSQ HOSP IP/OBS HIGH 50: CPT

## 2021-07-01 PROCEDURE — 35221 RPR BLD VSL DIR INTRA-ABDL: CPT

## 2021-07-01 PROCEDURE — 64818 SYMPATHECTOMY LUMBAR: CPT | Mod: 82

## 2021-07-01 PROCEDURE — 35281 RPR BLVSL GR OT/TH VN NTR-AB: CPT

## 2021-07-01 PROCEDURE — 49010 EXPLORATION BEHIND ABDOMEN: CPT

## 2021-07-01 PROCEDURE — 64818 SYMPATHECTOMY LUMBAR: CPT

## 2021-07-01 PROCEDURE — 99221 1ST HOSP IP/OBS SF/LOW 40: CPT | Mod: 57

## 2021-07-01 RX ORDER — CHLORHEXIDINE GLUCONATE 213 G/1000ML
1 SOLUTION TOPICAL
Refills: 0 | Status: DISCONTINUED | OUTPATIENT
Start: 2021-07-02 | End: 2021-07-05

## 2021-07-01 RX ORDER — VALACYCLOVIR 500 MG/1
1000 TABLET, FILM COATED ORAL DAILY
Refills: 0 | Status: DISCONTINUED | OUTPATIENT
Start: 2021-07-01 | End: 2021-07-05

## 2021-07-01 RX ORDER — HYDROMORPHONE HYDROCHLORIDE 2 MG/ML
0.5 INJECTION INTRAMUSCULAR; INTRAVENOUS; SUBCUTANEOUS
Refills: 0 | Status: DISCONTINUED | OUTPATIENT
Start: 2021-07-01 | End: 2021-07-03

## 2021-07-01 RX ORDER — CITALOPRAM 10 MG/1
20 TABLET, FILM COATED ORAL DAILY
Refills: 0 | Status: DISCONTINUED | OUTPATIENT
Start: 2021-07-02 | End: 2021-07-02

## 2021-07-01 RX ORDER — ENOXAPARIN SODIUM 100 MG/ML
40 INJECTION SUBCUTANEOUS EVERY 24 HOURS
Refills: 0 | Status: DISCONTINUED | OUTPATIENT
Start: 2021-07-02 | End: 2021-07-05

## 2021-07-01 RX ORDER — PANTOPRAZOLE SODIUM 20 MG/1
40 TABLET, DELAYED RELEASE ORAL
Refills: 0 | Status: DISCONTINUED | OUTPATIENT
Start: 2021-07-01 | End: 2021-07-04

## 2021-07-01 RX ORDER — SODIUM CHLORIDE 9 MG/ML
1000 INJECTION, SOLUTION INTRAVENOUS ONCE
Refills: 0 | Status: COMPLETED | OUTPATIENT
Start: 2021-07-01 | End: 2021-07-01

## 2021-07-01 RX ORDER — ASPIRIN/CALCIUM CARB/MAGNESIUM 324 MG
325 TABLET ORAL DAILY
Refills: 0 | Status: DISCONTINUED | OUTPATIENT
Start: 2021-07-02 | End: 2021-07-02

## 2021-07-01 RX ORDER — LANOLIN ALCOHOL/MO/W.PET/CERES
0 CREAM (GRAM) TOPICAL
Qty: 0 | Refills: 0 | DISCHARGE

## 2021-07-01 RX ORDER — ALBUTEROL 90 UG/1
2 AEROSOL, METERED ORAL
Qty: 0 | Refills: 0 | DISCHARGE

## 2021-07-01 RX ORDER — OXYCODONE HYDROCHLORIDE 5 MG/1
10 TABLET ORAL EVERY 6 HOURS
Refills: 0 | Status: DISCONTINUED | OUTPATIENT
Start: 2021-07-01 | End: 2021-07-02

## 2021-07-01 RX ORDER — FLUTICASONE PROPIONATE AND SALMETEROL 50; 250 UG/1; UG/1
1 POWDER ORAL; RESPIRATORY (INHALATION)
Qty: 0 | Refills: 0 | DISCHARGE

## 2021-07-01 RX ORDER — POLYETHYLENE GLYCOL 3350 17 G/17G
17 POWDER, FOR SOLUTION ORAL DAILY
Refills: 0 | Status: DISCONTINUED | OUTPATIENT
Start: 2021-07-01 | End: 2021-07-05

## 2021-07-01 RX ORDER — OMEPRAZOLE 10 MG/1
1 CAPSULE, DELAYED RELEASE ORAL
Qty: 0 | Refills: 0 | DISCHARGE

## 2021-07-01 RX ORDER — ACETAMINOPHEN 500 MG
650 TABLET ORAL EVERY 6 HOURS
Refills: 0 | Status: DISCONTINUED | OUTPATIENT
Start: 2021-07-01 | End: 2021-07-01

## 2021-07-01 RX ORDER — ACETAMINOPHEN 500 MG
1000 TABLET ORAL EVERY 6 HOURS
Refills: 0 | Status: COMPLETED | OUTPATIENT
Start: 2021-07-01 | End: 2021-07-02

## 2021-07-01 RX ORDER — OXYCODONE HYDROCHLORIDE 5 MG/1
5 TABLET ORAL EVERY 6 HOURS
Refills: 0 | Status: DISCONTINUED | OUTPATIENT
Start: 2021-07-01 | End: 2021-07-01

## 2021-07-01 RX ORDER — LANOLIN ALCOHOL/MO/W.PET/CERES
3 CREAM (GRAM) TOPICAL AT BEDTIME
Refills: 0 | Status: DISCONTINUED | OUTPATIENT
Start: 2021-07-02 | End: 2021-07-04

## 2021-07-01 RX ORDER — BUDESONIDE AND FORMOTEROL FUMARATE DIHYDRATE 160; 4.5 UG/1; UG/1
2 AEROSOL RESPIRATORY (INHALATION)
Refills: 0 | Status: DISCONTINUED | OUTPATIENT
Start: 2021-07-02 | End: 2021-07-02

## 2021-07-01 RX ORDER — GABAPENTIN 400 MG/1
600 CAPSULE ORAL AT BEDTIME
Refills: 0 | Status: DISCONTINUED | OUTPATIENT
Start: 2021-07-02 | End: 2021-07-05

## 2021-07-01 RX ORDER — IPRATROPIUM/ALBUTEROL SULFATE 18-103MCG
3 AEROSOL WITH ADAPTER (GRAM) INHALATION EVERY 6 HOURS
Refills: 0 | Status: DISCONTINUED | OUTPATIENT
Start: 2021-07-01 | End: 2021-07-05

## 2021-07-01 RX ORDER — SODIUM CHLORIDE 9 MG/ML
1000 INJECTION, SOLUTION INTRAVENOUS
Refills: 0 | Status: DISCONTINUED | OUTPATIENT
Start: 2021-07-01 | End: 2021-07-02

## 2021-07-01 RX ORDER — OXYCODONE HYDROCHLORIDE 5 MG/1
5 TABLET ORAL EVERY 4 HOURS
Refills: 0 | Status: DISCONTINUED | OUTPATIENT
Start: 2021-07-01 | End: 2021-07-04

## 2021-07-01 RX ORDER — SENNA PLUS 8.6 MG/1
2 TABLET ORAL AT BEDTIME
Refills: 0 | Status: DISCONTINUED | OUTPATIENT
Start: 2021-07-01 | End: 2021-07-05

## 2021-07-01 RX ORDER — GABAPENTIN 400 MG/1
600 CAPSULE ORAL ONCE
Refills: 0 | Status: COMPLETED | OUTPATIENT
Start: 2021-07-01 | End: 2021-07-01

## 2021-07-01 RX ORDER — NORETHINDRONE AND ETHINYL ESTRADIOL 0.4-0.035
1 KIT ORAL
Qty: 0 | Refills: 0 | DISCHARGE

## 2021-07-01 RX ADMIN — VALACYCLOVIR 1000 MILLIGRAM(S): 500 TABLET, FILM COATED ORAL at 23:39

## 2021-07-01 RX ADMIN — GABAPENTIN 600 MILLIGRAM(S): 400 CAPSULE ORAL at 23:19

## 2021-07-01 RX ADMIN — Medication 1000 MILLIGRAM(S): at 22:30

## 2021-07-01 RX ADMIN — SODIUM CHLORIDE 2000 MILLILITER(S): 9 INJECTION, SOLUTION INTRAVENOUS at 22:04

## 2021-07-01 RX ADMIN — SODIUM CHLORIDE 125 MILLILITER(S): 9 INJECTION, SOLUTION INTRAVENOUS at 21:56

## 2021-07-01 RX ADMIN — Medication 400 MILLIGRAM(S): at 22:04

## 2021-07-01 NOTE — CONSULT NOTE ADULT - ASSESSMENT
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

## 2021-07-01 NOTE — BRIEF OPERATIVE NOTE - NSICDXBRIEFPROCEDURE_GEN_ALL_CORE_FT
PROCEDURES:  Laparoscopy, with median arcuate ligament release 01-Jul-2021 22:11:56  Violet Vázquez

## 2021-07-01 NOTE — CONSULT NOTE ADULT - SUBJECTIVE AND OBJECTIVE BOX
HISTORY OF PRESENT ILLNESS:  51 y/o female w/ a PMHx of Graves disease, asthma, CHRIS (uses dental apparatus), arthritis, LE neuropathy, depression, herpes simplex w/ persistent oral sores, and who presented today for a scheduled robotic-assisted laparoscopic median arcuate ligament release for persistent nausea, inability to tolerate solid foods, and abdominal pain. Case was complicated by an injury to the base of the celiac axis requiring conversion to an open laparotomy and primary repair w/ 4-0 Prolene x2 by cardiothoracic surgery. She was given 3 L of crystalloid, 1 unit of PRBCs, and 160 mL of CellSaver. EBL was 500 mL and UOP was 425 mL. Patient was extubated at the end of the case. SICU consulted for hemodynamic monitoring. Patient is reporting severe incisional abdominal pain but otherwise denies headache, dizziness, weakness, fevers, chills, shortness of breath, chest pain, or nausea/vomiting.    PAST MEDICAL HISTORY:  - Arthritis  - H/O abdominal pain  - Asthma  - Graves disease  - Cystic fibrosis carrier  - Seasonal allergies  - Frequent sinus infections  - History of 2019 novel coronavirus disease (COVID-19)  - H/O neuropathy  - H/O carpal tunnel syndrome  - H/O abnormal weight loss  - CHRIS (obstructive sleep apnea)    PAST SURGICAL HISTORY: H/O:  section    H/O endoscopy    H/O Graves&#x27; disease        HOME MEDICATIONS:    ALLERGIES: amoxicillin (Hives)  sulfa drugs (Hives)      FAMILY HISTORY: FH: kidney cancer (Sibling)        SOCIAL HISTORY:    REVIEW OF SYSTEMS:    VITAL SIGNS:  ICU Vital Signs Last 24 Hrs  T(C): 36.4 (2021 23:00), Max: 36.9 (2021 21:35)  T(F): 97.5 (2021 23:00), Max: 98.4 (2021 21:35)  HR: 76 (2021 23:00) (76 - 79)  BP: 109/65 (2021 21:35) (106/72 - 109/65)  BP(mean): 81 (2021 21:35) (81 - 81)  ABP: 141/68 (2021 23:00) (114/68 - 141/68)  ABP(mean): 92 (2021 23:00) (87 - 92)  RR: 13 (2021 23:00) (12 - 18)  SpO2: 96% (2021 23:00) (96% - 100%)      PHYSICAL EXAMINATION:  General - well-nourished, no acute distress  Neuro - awake, alert, oriented x4, no acute focal deficits  HEENT - normocephalic, PERRL, moist mucous membranes  Lungs - clear to auscultation bilaterally, right chest wall tenderness  Heart - regular rate and rhythm, S1S2 / irregularly irregular  Abdomen - soft, nontender, nondistended  Extremities - all four extremities are warm & pink with 2+ pulses, strength 5/5, sensation intact    LABS:                          13.0   15.07 )-----------( 336      ( 2021 21:56 )             40.0       07-01    134<L>  |  102  |  10  ----------------------------<  182<H>  4.4   |  18<L>  |  0.82    Ca    8.5      2021 21:56  Phos  3.9     07-01  Mg     2.0     07-01        PT/INR - ( 2021 21:56 )   PT: 13.1 sec;   INR: 1.10 ratio         PTT - ( 2021 21:56 )  PTT:26.3 sec    ABG - ( 2021 21:56 )  pH: 7.36  /  pCO2: 38    /  pO2: 126   / HCO3: 21    / Base Excess: -3.6  /  SaO2: 99      Lactate: x                      RECENT CULTURES:      CAPILLARY BLOOD GLUCOSE      POCT Blood Glucose.: 105 mg/dL (2021 11:29)      IMAGING STUDIES: HISTORY OF PRESENT ILLNESS:  49 y/o female w/ a PMHx of Graves disease, asthma, CHRIS (uses dental apparatus), arthritis, LE neuropathy, depression, herpes simplex w/ persistent oral sores, and who presented today for a scheduled robotic-assisted laparoscopic median arcuate ligament release for persistent nausea, inability to tolerate solid foods, and abdominal pain. Case was complicated by an injury to the base of the celiac axis requiring conversion to an open laparotomy and primary repair w/ 4-0 Prolene x2 by cardiothoracic surgery. She was given 3 L of crystalloid, 1 unit of PRBCs, and 160 mL of CellSaver. EBL was 500 mL and UOP was 425 mL. Patient was extubated at the end of the case. SICU consulted for hemodynamic monitoring. Patient is reporting severe incisional abdominal pain but otherwise denies headache, dizziness, weakness, fevers, chills, shortness of breath, chest pain, or nausea/vomiting.    PAST MEDICAL HISTORY:  - Arthritis  - H/O abdominal pain  - Asthma  - Graves disease  - Cystic fibrosis carrier  - Seasonal allergies  - Frequent sinus infections  - History of 2019 novel coronavirus disease (COVID-19)  - H/O neuropathy  - H/O carpal tunnel syndrome  - H/O abnormal weight loss  - CHRIS (obstructive sleep apnea)    PAST SURGICAL HISTORY:  - H/O:  section  - H/O endoscopy    HOME MEDICATIONS:  - Advair Diskus 250 mcg-50 mcg inhalation powder: 1 puff(s) inhaled once a day  - albuterol 90 mcg/inh inhalation aerosol: 2 puff(s) inhaled every 6 hours, As Needed  - gabapentin 600 mg oral tablet: 1 tab(s) orally once a day (at bedtime)  - Junel Fe 1/20 oral tablet: 1 tab(s) orally once a day  - Melatonin 3 mg oral tablet: orally once a day (at bedtime)  - omeprazole 20 mg oral delayed release tablet: 1 tab(s) orally once a day (at bedtime)    ALLERGIES:  - amoxicillin (Hives)  - sulfa drugs (Hives)    FAMILY HISTORY: FH: kidney cancer (Sibling)    SOCIAL HISTORY:    REVIEW OF SYSTEMS:    VITAL SIGNS:  ICU Vital Signs Last 24 Hrs  T(C): 36.4 (2021 23:00), Max: 36.9 (2021 21:35)  T(F): 97.5 (2021 23:00), Max: 98.4 (2021 21:35)  HR: 76 (2021 23:00) (76 - 79)  BP: 109/65 (2021 21:35) (106/72 - 109/65)  BP(mean): 81 (2021 21:35) (81 - 81)  ABP: 141/68 (2021 23:00) (114/68 - 141/68)  ABP(mean): 92 (2021 23:00) (87 - 92)  RR: 13 (2021 23:00) (12 - 18)  SpO2: 96% (2021 23:00) (96% - 100%)      PHYSICAL EXAMINATION:  General - well-nourished, no acute distress  Neuro - awake, alert, oriented x4, no acute focal deficits  HEENT - normocephalic, PERRL, moist mucous membranes  Lungs - clear to auscultation bilaterally, right chest wall tenderness  Heart - regular rate and rhythm, S1S2 / irregularly irregular  Abdomen - soft, nontender, nondistended  Extremities - all four extremities are warm & pink with 2+ pulses, strength 5/5, sensation intact    LABS:                          13.0   15.07 )-----------( 336      ( 2021 21:56 )             40.0       07-01    134<L>  |  102  |  10  ----------------------------<  182<H>  4.4   |  18<L>  |  0.82    Ca    8.5      :56  Phos  3.9     07-01  Mg     2.0     07-01        PT/INR - ( 2021 21:56 )   PT: 13.1 sec;   INR: 1.10 ratio         PTT - ( :56 )  PTT:26.3 sec    ABG - ( :56 )  pH: 7.36  /  pCO2: 38    /  pO2: 126   / HCO3: 21    / Base Excess: -3.6  /  SaO2: 99      Lactate: x                      RECENT CULTURES:      CAPILLARY BLOOD GLUCOSE      POCT Blood Glucose.: 105 mg/dL (2021 11:29)      IMAGING STUDIES: HISTORY OF PRESENT ILLNESS:  49 y/o female w/ a PMHx of Graves disease, asthma, CHRIS (uses dental apparatus), arthritis, LE neuropathy, depression, and herpes simplex w/ persistent oral sores who presented today for a scheduled robotic-assisted laparoscopic median arcuate ligament release for persistent nausea, inability to tolerate solid foods, and abdominal pain. Case was complicated by an injury to the base of the celiac axis requiring conversion to an open laparotomy and primary repair w/ 4-0 Prolene x2 by cardiothoracic & vascular surgery. She was given 3 L of crystalloid, 1 unit of PRBCs, and 160 mL of CellSaver. EBL was 500 mL and UOP was 425 mL. Patient was extubated at the end of the case. SICU consulted for hemodynamic monitoring. Patient is reporting severe incisional abdominal pain but otherwise denies headache, dizziness, weakness, fevers, chills, shortness of breath, chest pain, or nausea/vomiting.    PAST MEDICAL HISTORY:  - Arthritis  - H/O abdominal pain  - Asthma  - Graves disease  - Cystic fibrosis carrier  - Seasonal allergies  - Frequent sinus infections  - History of 2019 novel coronavirus disease (COVID-19)  - H/O neuropathy  - H/O carpal tunnel syndrome  - H/O abnormal weight loss  - CHRIS (obstructive sleep apnea)    PAST SURGICAL HISTORY:  - H/O:  section  - H/O endoscopy    HOME MEDICATIONS:  - Advair Diskus 250 mcg-50 mcg inhalation powder: 1 puff(s) inhaled once a day  - albuterol 90 mcg/inh inhalation aerosol: 2 puff(s) inhaled every 6 hours, As Needed  - gabapentin 600 mg oral tablet: 1 tab(s) orally once a day (at bedtime)  - Junel Fe 1/20 oral tablet: 1 tab(s) orally once a day  - Melatonin 3 mg oral tablet: orally once a day (at bedtime)  - omeprazole 20 mg oral delayed release tablet: 1 tab(s) orally once a day (at bedtime)    ALLERGIES:  - amoxicillin (Hives)  - sulfa drugs (Hives)    FAMILY HISTORY: FH: kidney cancer (Sibling)    SOCIAL HISTORY: Social drinker of 1-2 drinks a month, denies EtOH or illicit substance use    REVIEW OF SYSTEMS: 10 point ROS reviewed and negative except as noted in HPI    VITAL SIGNS:  T(C): 36.4 (2021 23:00), Max: 36.9 (2021 21:35)  T(F): 97.5 (2021 23:00), Max: 98.4 (2021 21:35)  HR: 76 (2021 23:00) (76 - 79)  BP: 109/65 (2021 21:35) (106/72 - 109/65)  BP(mean): 81 (2021 21:35) (81 - 81)  ABP: 141/68 (2021 23:00) (114/68 - 141/68)  ABP(mean): 92 (2021 23:00) (87 - 92)  RR: 13 (2021 23:00) (12 - 18)  SpO2: 96% (2021 23:00) (96% - 100%)    PHYSICAL EXAMINATION:  General - well-nourished, no acute distress  Neuro - awake, alert, oriented x4, no acute focal deficits  HEENT - normocephalic, PERRL, moist mucous membranes  Lungs - clear to auscultation bilaterally  Heart - regular rate and rhythm, S1S2  Abdomen - softly distended, libra-incisional tenderness, incision dressing C/D/I w/ minimal strikethrough  Extremities - all four extremities are warm & pink with 2+ pulses, strength 5/5, sensation intact    LABS:                        13.0   15.07 )-----------( 336      ( 2021 21:56 )             40.0     134<L>  |  102  |  10  ----------------------------<  182<H>  4.4   |  18<L>  |  0.82    Ca    8.5      2021 21:56  Phos  3.9  Mg     2.0    PT/INR - ( :56 )   PT: 13.1 sec;   INR: 1.10 ratio    PTT - ( :56 )  PTT:26.3 sec    ABG - ( 2021 21:56 )  pH: 7.36  /  pCO2: 38    /  pO2: 126   / HCO3: 21    / Base Excess: -3.6  /  SaO2: 99    /     Lactate: 3.5    POCT Blood Glucose.: 105 mg/dL (2021 11:29)    IMAGING STUDIES: None.

## 2021-07-01 NOTE — CONSULT NOTE ADULT - SUBJECTIVE AND OBJECTIVE BOX
CC: Patient is a 51y old  Female who presents with a chief complaint of median arcuate ligament release (2021 14:16)      HPI:  49 y/o female with hx of postprandial abdominal pain with solid food (able to tolerate liquids).  PMHx includes Graves disease s/p Methamizone, now Euthyroid, asthma, CHRIS (uses dental apparatus), arthritis and neuropathy to lower extremities  She was admitted from 5/10/21 to 21 with worsening abdominal pain and inability to tolerate solid food.  Since discharge she is tolerating liquids, but is still unable to tolerate solids.  She reports 40lb weight loss over the past 2 months.  She complains of bloating, flatus and soft bowel movements.  She denies any nausea, vomiting, diarrhea, constipation.  She is scheduled for robotic assisted laparoscopic median arcuate ligament release on 21.    She is scheduled for preop Covid swab on 21.  (2021 14:16)    Patient undergoing robotic MALS release. Aortic injury noted intraop and Vascular Surgery called for intraop consult.    PMH  Arthritis    H/O abdominal pain    Asthma    Graves disease    Cystic fibrosis carrier    Seasonal allergies    Frequent sinus infections    History of 2019 novel coronavirus disease (COVID-19)    H/O neuropathy    H/O carpal tunnel syndrome    H/O abnormal weight loss    CHRIS (obstructive sleep apnea)      PSH  H/O:  section    H/O endoscopy    H/O Graves&#x27; disease      MEDS    Allergies    amoxicillin (Hives)  sulfa drugs (Hives)    Intolerances        Social        Physical Exam  T(C): 36.7 (21 @ 15:23), Max: 36.7 (21 @ 11:54)  HR: 79 (21 @ 15:23) (79 - 79)  BP: 106/72 (21 @ 15:23) (106/72 - 106/72)  RR: 18 (21 @ 15:23) (18 - 18)  SpO2: 97% (21 @ 15:23) (97% - 97%)  Wt(kg): --  Tmax: T(C): , Max: 36.7 (21 @ 11:54)  Wt(kg): --              ABG - ( 2021 18:31 )  pH, Arterial: 7.41  pH, Blood: x     /  pCO2: 33    /  pO2: 405   / HCO3: 20    / Base Excess: -3.1  /  SaO2: 100

## 2021-07-01 NOTE — CONSULT NOTE ADULT - ATTENDING COMMENTS
EVA Aguilar MD performed a history and physical exam of the patient and discussed  the findings and plan with the house officer. I reviewed the resident note and agree with the findings and plan   I Kalin Aguilar MD have personally seen and examined the patient at bedside today at  8 pm      please see the intra op dictated consult
Patient seen and examined and agree with above.  51 y/o female w/ a PMHx of Graves disease, asthma, CHRIS (uses dental apparatus), arthritis, LE neuropathy, depression who is s/p robotic-assisted laparoscopic median arcuate ligament release c/b injury to the base of the celiac axis requiring conversion to an open laparotomy and primary repair. The patient required 1 liter IVF for resuscitation with improved lactic acidosis.   She remains hemodynamically stable and will continue to monitor.  I have reviewed PMH, PSH, labs, meds and imaging.  H/H is stable.

## 2021-07-01 NOTE — CONSULT NOTE ADULT - ASSESSMENT
51 y/o female w/ a PMHx of Graves disease, asthma, CHRIS (uses dental apparatus), arthritis, LE neuropathy, depression, and herpes simplex w/ persistent oral sores s/p robotic-assisted laparoscopic median arcuate ligament release c/b injury to the base of the celiac axis requiring conversion to an open laparotomy and primary repair w/ 4-0 Prolene x2 by cardiothoracic surgery requiring hemodynamic monitoring post-operatively    PLAN:    Neuro: acute post-op pain, depression, LE neuropathy  - Multimodal pain control with    Resp: asthma, CHRIS  - Out of bed to chair, ambulate as tolerated, and incentive spirometry to prevent atelectasis  - Mechanical ventilation    CV:  - Monitor vital signs    GI: s/p robotic-assisted laparoscopic median arcuate ligament release c/b injury to the base of the celiac axis requiring conversion to an open laparotomy and primary repair w/ 4-0 Prolene x2  -   - Bowel regimen with senna & Miralax  - Protonix for stress ulcer prophylaxis    Renal:  - Monitor I&Os and electrolytes w/ repletions as necessary    Heme:  - Monitor CBC and coags  - Lovenox for VTE prophylaxis  -  mg daily as per vascular surgery    ID: herpes simplex  - Monitor for clinical evidence of active infection  - Monitor WBC, temperature, and procalcitonin  - Empiric antibiotics    Endo:   - Monitor glucose ; HgbA1C  - for HLD  - for hypothyroidism    Code Status: Full code    Disposition: Will admit to SICU    Trini Reed PA-C     [id number] 51 y/o female w/ a PMHx of Graves disease, asthma, CHRIS (uses dental apparatus), arthritis, LE neuropathy, depression, and herpes simplex w/ persistent oral sores s/p robotic-assisted laparoscopic median arcuate ligament release c/b injury to the base of the celiac axis requiring conversion to an open laparotomy and primary repair w/ 4-0 Prolene x2 by cardiothoracic surgery requiring hemodynamic monitoring post-operatively    PLAN:    Neuro: acute post-op pain, depression, LE neuropathy  - Multimodal pain control with acetaminophen, oxycodone, Dilaudid, and home gabapentin  - Home citalopram and melatonin    Resp: asthma, CHRIS  - Out of bed to chair, ambulate as tolerated, and incentive spirometry to prevent atelectasis  - Symbicort (takes Advair at home) w/ Duoneb PRN shortness of breath/wheezing    CV:  - Monitor vital signs    GI: s/p robotic-assisted laparoscopic median arcuate ligament release c/b injury to the base of the celiac axis requiring conversion to an open laparotomy and primary repair w/ 4-0 Prolene x2  - NPO except medications overnight but may have CLD if patient remains hemodynamically stable by the AM  - Bowel regimen with senna & Miralax  - Home Protonix for GERD    Renal:  - Monitor I&Os and electrolytes w/ repletions as necessary  - LR at 125 mL/hr while NPO    Heme: acute blood loss anemia  - Monitor CBC and coags  - Lovenox for VTE prophylaxis  -  mg daily as per vascular surgery following celiac axis repair    ID: herpes simplex  - Monitor for clinical evidence of active infection  - Home valacyclovir 1000 mg PO daily for herpes simplex    Endo:   - Monitor glucose ; HgbA1C  - for HLD  - for hypothyroidism    Code Status: Full code    Disposition: Will admit to [facility] Reed PA-C     [id number] 51 y/o female w/ a PMHx of Graves disease, asthma, CHRIS (uses dental apparatus), arthritis, LE neuropathy, depression, and herpes simplex w/ persistent oral sores s/p robotic-assisted laparoscopic median arcuate ligament release c/b injury to the base of the celiac axis requiring conversion to an open laparotomy and primary repair w/ 4-0 Prolene x2 by cardiothoracic surgery requiring hemodynamic monitoring post-operatively    PLAN:    Neuro: acute post-op pain, depression, LE neuropathy  - Multimodal pain control with acetaminophen, oxycodone, Dilaudid, and home gabapentin  - Home citalopram and melatonin    Resp: asthma, CHRIS  - Out of bed to chair, ambulate as tolerated, and incentive spirometry to prevent atelectasis  - Symbicort (takes Advair at home) w/ Duoneb PRN shortness of breath/wheezing    CV: no acute issues  - Monitor vital signs  - Will give 1 L bolus LR for elevated lactate of 3.5 and recheck    GI: s/p robotic-assisted laparoscopic median arcuate ligament release c/b injury to the base of the celiac axis requiring conversion to an open laparotomy and primary repair w/ 4-0 Prolene x2  - NPO except medications overnight but may have CLD if patient remains hemodynamically stable by the AM  - Bowel regimen with senna & Miralax  - Home Protonix for GERD    Renal: no acute issues  - Monitor I&Os and electrolytes w/ repletions as necessary  - LR at 125 mL/hr while NPO    Heme: acute blood loss anemia  - Monitor CBC and coags  - Lovenox for VTE prophylaxis  -  mg daily as per vascular surgery following celiac axis repair    ID: herpes simplex  - Monitor for clinical evidence of active infection  - Home valacyclovir 1000 mg PO daily for herpes simplex    Endo: no acute issues  - Monitor glucose on BMP    Code Status: Full code    [facility]     [id number]

## 2021-07-01 NOTE — BRIEF OPERATIVE NOTE - OPERATION/FINDINGS
robotic median arcuate ligament release complicated by bleeding from celiac artery requiring exploratory laparotomy and control with 4-0 prolene by Dr. Law. Good pulse and signal in celiac artery and all branches after repair.     preop brewer, arterial line, and 16G IV (second). abdominal access by veress in LUQ and optiview. robotic ports placed (x4) across upper abdomen. assist port placed in L mid abdomen. Prograsp (1), camera (2), vessel sealer (3), maryland bipolar (4). opened pars flacida, dissected down onto right antonio, dissected crural fibers off aorta, and neurectomy over left gastric and celiac. bleeding from base of celiac artery controlled with direct pressure, but rebled when pressure was released. Upper midline laparotomy after assistance was called and room was prepared.  suture control of the bleeding with 4-0 prolene x 2. midline closed with looped #1 pds.

## 2021-07-01 NOTE — CONSULT NOTE ADULT - PROBLEM SELECTOR RECOMMENDATION 9
EVA Aguilar MD performed a history and physical exam of the patient and discussed  the findings and plan with the house officer. I reviewed the resident note and agree with the findings and plan   I Kalin Aguilar MD have personally seen and examined the patient at bedside today at  8 pm      please see the intra op dictated consult

## 2021-07-02 DIAGNOSIS — T81.9XXA UNSPECIFIED COMPLICATION OF PROCEDURE, INITIAL ENCOUNTER: ICD-10-CM

## 2021-07-02 DIAGNOSIS — T14.8XXA OTHER INJURY OF UNSPECIFIED BODY REGION, INITIAL ENCOUNTER: ICD-10-CM

## 2021-07-02 LAB
ANION GAP SERPL CALC-SCNC: 14 MMOL/L — SIGNIFICANT CHANGE UP (ref 5–17)
BLD GP AB SCN SERPL QL: NEGATIVE — SIGNIFICANT CHANGE UP
BUN SERPL-MCNC: 9 MG/DL — SIGNIFICANT CHANGE UP (ref 7–23)
CALCIUM SERPL-MCNC: 8.7 MG/DL — SIGNIFICANT CHANGE UP (ref 8.4–10.5)
CHLORIDE SERPL-SCNC: 100 MMOL/L — SIGNIFICANT CHANGE UP (ref 96–108)
CO2 SERPL-SCNC: 19 MMOL/L — LOW (ref 22–31)
COVID-19 SPIKE DOMAIN AB INTERP: POSITIVE
COVID-19 SPIKE DOMAIN ANTIBODY RESULT: 25.4 U/ML — HIGH
CREAT SERPL-MCNC: 0.8 MG/DL — SIGNIFICANT CHANGE UP (ref 0.5–1.3)
GAS PNL BLDA: SIGNIFICANT CHANGE UP
GAS PNL BLDV: SIGNIFICANT CHANGE UP
GLUCOSE SERPL-MCNC: 172 MG/DL — HIGH (ref 70–99)
HCT VFR BLD CALC: 21.9 % — LOW (ref 34.5–45)
HCT VFR BLD CALC: 35.2 % — SIGNIFICANT CHANGE UP (ref 34.5–45)
HCT VFR BLD CALC: 35.3 % — SIGNIFICANT CHANGE UP (ref 34.5–45)
HCT VFR BLD CALC: 37.5 % — SIGNIFICANT CHANGE UP (ref 34.5–45)
HGB BLD-MCNC: 11.4 G/DL — LOW (ref 11.5–15.5)
HGB BLD-MCNC: 11.6 G/DL — SIGNIFICANT CHANGE UP (ref 11.5–15.5)
HGB BLD-MCNC: 12.3 G/DL — SIGNIFICANT CHANGE UP (ref 11.5–15.5)
HGB BLD-MCNC: 6.9 G/DL — CRITICAL LOW (ref 11.5–15.5)
MAGNESIUM SERPL-MCNC: 1.7 MG/DL — SIGNIFICANT CHANGE UP (ref 1.6–2.6)
MCHC RBC-ENTMCNC: 30.1 PG — SIGNIFICANT CHANGE UP (ref 27–34)
MCHC RBC-ENTMCNC: 30.1 PG — SIGNIFICANT CHANGE UP (ref 27–34)
MCHC RBC-ENTMCNC: 30.4 PG — SIGNIFICANT CHANGE UP (ref 27–34)
MCHC RBC-ENTMCNC: 30.4 PG — SIGNIFICANT CHANGE UP (ref 27–34)
MCHC RBC-ENTMCNC: 31.5 GM/DL — LOW (ref 32–36)
MCHC RBC-ENTMCNC: 32.4 GM/DL — SIGNIFICANT CHANGE UP (ref 32–36)
MCHC RBC-ENTMCNC: 32.8 GM/DL — SIGNIFICANT CHANGE UP (ref 32–36)
MCHC RBC-ENTMCNC: 32.9 GM/DL — SIGNIFICANT CHANGE UP (ref 32–36)
MCV RBC AUTO: 91.9 FL — SIGNIFICANT CHANGE UP (ref 80–100)
MCV RBC AUTO: 92.7 FL — SIGNIFICANT CHANGE UP (ref 80–100)
MCV RBC AUTO: 92.9 FL — SIGNIFICANT CHANGE UP (ref 80–100)
MCV RBC AUTO: 96.5 FL — SIGNIFICANT CHANGE UP (ref 80–100)
NRBC # BLD: 0 /100 WBCS — SIGNIFICANT CHANGE UP (ref 0–0)
PHOSPHATE SERPL-MCNC: 2.6 MG/DL — SIGNIFICANT CHANGE UP (ref 2.5–4.5)
PLATELET # BLD AUTO: 164 K/UL — SIGNIFICANT CHANGE UP (ref 150–400)
PLATELET # BLD AUTO: 221 K/UL — SIGNIFICANT CHANGE UP (ref 150–400)
PLATELET # BLD AUTO: 286 K/UL — SIGNIFICANT CHANGE UP (ref 150–400)
PLATELET # BLD AUTO: 300 K/UL — SIGNIFICANT CHANGE UP (ref 150–400)
POTASSIUM SERPL-MCNC: 3.8 MMOL/L — SIGNIFICANT CHANGE UP (ref 3.5–5.3)
POTASSIUM SERPL-SCNC: 3.8 MMOL/L — SIGNIFICANT CHANGE UP (ref 3.5–5.3)
RBC # BLD: 2.27 M/UL — LOW (ref 3.8–5.2)
RBC # BLD: 3.79 M/UL — LOW (ref 3.8–5.2)
RBC # BLD: 3.81 M/UL — SIGNIFICANT CHANGE UP (ref 3.8–5.2)
RBC # BLD: 4.08 M/UL — SIGNIFICANT CHANGE UP (ref 3.8–5.2)
RBC # FLD: 13.7 % — SIGNIFICANT CHANGE UP (ref 10.3–14.5)
RBC # FLD: 13.8 % — SIGNIFICANT CHANGE UP (ref 10.3–14.5)
RH IG SCN BLD-IMP: POSITIVE — SIGNIFICANT CHANGE UP
SARS-COV-2 IGG+IGM SERPL QL IA: 25.4 U/ML — HIGH
SARS-COV-2 IGG+IGM SERPL QL IA: POSITIVE
SODIUM SERPL-SCNC: 133 MMOL/L — LOW (ref 135–145)
WBC # BLD: 13.86 K/UL — HIGH (ref 3.8–10.5)
WBC # BLD: 14.66 K/UL — HIGH (ref 3.8–10.5)
WBC # BLD: 14.93 K/UL — HIGH (ref 3.8–10.5)
WBC # BLD: 7.93 K/UL — SIGNIFICANT CHANGE UP (ref 3.8–10.5)
WBC # FLD AUTO: 13.86 K/UL — HIGH (ref 3.8–10.5)
WBC # FLD AUTO: 14.66 K/UL — HIGH (ref 3.8–10.5)
WBC # FLD AUTO: 14.93 K/UL — HIGH (ref 3.8–10.5)
WBC # FLD AUTO: 7.93 K/UL — SIGNIFICANT CHANGE UP (ref 3.8–10.5)

## 2021-07-02 PROCEDURE — 93010 ELECTROCARDIOGRAM REPORT: CPT

## 2021-07-02 RX ORDER — OXYCODONE HYDROCHLORIDE 5 MG/1
10 TABLET ORAL EVERY 4 HOURS
Refills: 0 | Status: DISCONTINUED | OUTPATIENT
Start: 2021-07-02 | End: 2021-07-04

## 2021-07-02 RX ORDER — OXYCODONE HYDROCHLORIDE 5 MG/1
5 TABLET ORAL ONCE
Refills: 0 | Status: DISCONTINUED | OUTPATIENT
Start: 2021-07-02 | End: 2021-07-02

## 2021-07-02 RX ORDER — LORATADINE 10 MG/1
10 TABLET ORAL AT BEDTIME
Refills: 0 | Status: DISCONTINUED | OUTPATIENT
Start: 2021-07-02 | End: 2021-07-05

## 2021-07-02 RX ORDER — ASPIRIN/CALCIUM CARB/MAGNESIUM 324 MG
81 TABLET ORAL DAILY
Refills: 0 | Status: DISCONTINUED | OUTPATIENT
Start: 2021-07-02 | End: 2021-07-05

## 2021-07-02 RX ORDER — KETOROLAC TROMETHAMINE 30 MG/ML
30 SYRINGE (ML) INJECTION EVERY 6 HOURS
Refills: 0 | Status: DISCONTINUED | OUTPATIENT
Start: 2021-07-02 | End: 2021-07-03

## 2021-07-02 RX ORDER — OXYCODONE HYDROCHLORIDE 5 MG/1
10 TABLET ORAL EVERY 4 HOURS
Refills: 0 | Status: DISCONTINUED | OUTPATIENT
Start: 2021-07-02 | End: 2021-07-02

## 2021-07-02 RX ORDER — LANOLIN ALCOHOL/MO/W.PET/CERES
3 CREAM (GRAM) TOPICAL ONCE
Refills: 0 | Status: COMPLETED | OUTPATIENT
Start: 2021-07-02 | End: 2021-07-02

## 2021-07-02 RX ORDER — CITALOPRAM 10 MG/1
20 TABLET, FILM COATED ORAL AT BEDTIME
Refills: 0 | Status: DISCONTINUED | OUTPATIENT
Start: 2021-07-02 | End: 2021-07-05

## 2021-07-02 RX ORDER — FLUTICASONE PROPIONATE AND SALMETEROL 50; 250 UG/1; UG/1
1 POWDER ORAL; RESPIRATORY (INHALATION)
Refills: 0 | Status: DISCONTINUED | OUTPATIENT
Start: 2021-07-02 | End: 2021-07-05

## 2021-07-02 RX ORDER — CALCIUM GLUCONATE 100 MG/ML
2 VIAL (ML) INTRAVENOUS ONCE
Refills: 0 | Status: COMPLETED | OUTPATIENT
Start: 2021-07-02 | End: 2021-07-02

## 2021-07-02 RX ORDER — SODIUM CHLORIDE 9 MG/ML
1000 INJECTION INTRAMUSCULAR; INTRAVENOUS; SUBCUTANEOUS ONCE
Refills: 0 | Status: COMPLETED | OUTPATIENT
Start: 2021-07-02 | End: 2021-07-02

## 2021-07-02 RX ORDER — ACETAMINOPHEN 500 MG
1000 TABLET ORAL EVERY 6 HOURS
Refills: 0 | Status: COMPLETED | OUTPATIENT
Start: 2021-07-02 | End: 2021-07-03

## 2021-07-02 RX ORDER — CITALOPRAM 10 MG/1
20 TABLET, FILM COATED ORAL ONCE
Refills: 0 | Status: COMPLETED | OUTPATIENT
Start: 2021-07-02 | End: 2021-07-02

## 2021-07-02 RX ORDER — ACETAMINOPHEN 500 MG
975 TABLET ORAL EVERY 6 HOURS
Refills: 0 | Status: DISCONTINUED | OUTPATIENT
Start: 2021-07-02 | End: 2021-07-02

## 2021-07-02 RX ORDER — LORATADINE 10 MG/1
10 TABLET ORAL ONCE
Refills: 0 | Status: COMPLETED | OUTPATIENT
Start: 2021-07-02 | End: 2021-07-02

## 2021-07-02 RX ADMIN — CHLORHEXIDINE GLUCONATE 1 APPLICATION(S): 213 SOLUTION TOPICAL at 05:02

## 2021-07-02 RX ADMIN — LORATADINE 10 MILLIGRAM(S): 10 TABLET ORAL at 00:57

## 2021-07-02 RX ADMIN — HYDROMORPHONE HYDROCHLORIDE 0.5 MILLIGRAM(S): 2 INJECTION INTRAMUSCULAR; INTRAVENOUS; SUBCUTANEOUS at 00:30

## 2021-07-02 RX ADMIN — Medication 3 MILLIGRAM(S): at 00:57

## 2021-07-02 RX ADMIN — POLYETHYLENE GLYCOL 3350 17 GRAM(S): 17 POWDER, FOR SOLUTION ORAL at 11:15

## 2021-07-02 RX ADMIN — CITALOPRAM 20 MILLIGRAM(S): 10 TABLET, FILM COATED ORAL at 00:30

## 2021-07-02 RX ADMIN — Medication 200 GRAM(S): at 04:21

## 2021-07-02 RX ADMIN — HYDROMORPHONE HYDROCHLORIDE 0.5 MILLIGRAM(S): 2 INJECTION INTRAMUSCULAR; INTRAVENOUS; SUBCUTANEOUS at 00:07

## 2021-07-02 RX ADMIN — CITALOPRAM 20 MILLIGRAM(S): 10 TABLET, FILM COATED ORAL at 22:19

## 2021-07-02 RX ADMIN — VALACYCLOVIR 1000 MILLIGRAM(S): 500 TABLET, FILM COATED ORAL at 22:59

## 2021-07-02 RX ADMIN — OXYCODONE HYDROCHLORIDE 5 MILLIGRAM(S): 5 TABLET ORAL at 10:40

## 2021-07-02 RX ADMIN — Medication 3 MILLILITER(S): at 11:33

## 2021-07-02 RX ADMIN — OXYCODONE HYDROCHLORIDE 10 MILLIGRAM(S): 5 TABLET ORAL at 22:10

## 2021-07-02 RX ADMIN — Medication 400 MILLIGRAM(S): at 20:16

## 2021-07-02 RX ADMIN — LORATADINE 10 MILLIGRAM(S): 10 TABLET ORAL at 22:11

## 2021-07-02 RX ADMIN — Medication 1000 MILLIGRAM(S): at 20:46

## 2021-07-02 RX ADMIN — Medication 1000 MILLIGRAM(S): at 11:00

## 2021-07-02 RX ADMIN — GABAPENTIN 600 MILLIGRAM(S): 400 CAPSULE ORAL at 22:10

## 2021-07-02 RX ADMIN — OXYCODONE HYDROCHLORIDE 5 MILLIGRAM(S): 5 TABLET ORAL at 02:26

## 2021-07-02 RX ADMIN — OXYCODONE HYDROCHLORIDE 5 MILLIGRAM(S): 5 TABLET ORAL at 03:06

## 2021-07-02 RX ADMIN — Medication 400 MILLIGRAM(S): at 16:40

## 2021-07-02 RX ADMIN — Medication 3 MILLIGRAM(S): at 22:10

## 2021-07-02 RX ADMIN — OXYCODONE HYDROCHLORIDE 5 MILLIGRAM(S): 5 TABLET ORAL at 13:30

## 2021-07-02 RX ADMIN — Medication 400 MILLIGRAM(S): at 10:30

## 2021-07-02 RX ADMIN — Medication 30 MILLIGRAM(S): at 20:07

## 2021-07-02 RX ADMIN — OXYCODONE HYDROCHLORIDE 10 MILLIGRAM(S): 5 TABLET ORAL at 18:41

## 2021-07-02 RX ADMIN — OXYCODONE HYDROCHLORIDE 10 MILLIGRAM(S): 5 TABLET ORAL at 05:51

## 2021-07-02 RX ADMIN — SODIUM CHLORIDE 1000 MILLILITER(S): 9 INJECTION INTRAMUSCULAR; INTRAVENOUS; SUBCUTANEOUS at 17:16

## 2021-07-02 RX ADMIN — SENNA PLUS 2 TABLET(S): 8.6 TABLET ORAL at 22:11

## 2021-07-02 RX ADMIN — ENOXAPARIN SODIUM 40 MILLIGRAM(S): 100 INJECTION SUBCUTANEOUS at 05:51

## 2021-07-02 RX ADMIN — Medication 1000 MILLIGRAM(S): at 17:10

## 2021-07-02 RX ADMIN — OXYCODONE HYDROCHLORIDE 10 MILLIGRAM(S): 5 TABLET ORAL at 22:40

## 2021-07-02 RX ADMIN — OXYCODONE HYDROCHLORIDE 5 MILLIGRAM(S): 5 TABLET ORAL at 10:02

## 2021-07-02 RX ADMIN — OXYCODONE HYDROCHLORIDE 5 MILLIGRAM(S): 5 TABLET ORAL at 13:01

## 2021-07-02 RX ADMIN — OXYCODONE HYDROCHLORIDE 10 MILLIGRAM(S): 5 TABLET ORAL at 06:50

## 2021-07-02 RX ADMIN — OXYCODONE HYDROCHLORIDE 10 MILLIGRAM(S): 5 TABLET ORAL at 19:10

## 2021-07-02 RX ADMIN — Medication 30 MILLIGRAM(S): at 20:37

## 2021-07-02 RX ADMIN — PANTOPRAZOLE SODIUM 40 MILLIGRAM(S): 20 TABLET, DELAYED RELEASE ORAL at 22:12

## 2021-07-02 RX ADMIN — PANTOPRAZOLE SODIUM 40 MILLIGRAM(S): 20 TABLET, DELAYED RELEASE ORAL at 07:08

## 2021-07-02 RX ADMIN — Medication 1000 MILLIGRAM(S): at 06:50

## 2021-07-02 RX ADMIN — Medication 400 MILLIGRAM(S): at 05:51

## 2021-07-02 RX ADMIN — Medication 81 MILLIGRAM(S): at 11:15

## 2021-07-02 NOTE — OCCUPATIONAL THERAPY INITIAL EVALUATION ADULT - LIVES WITH, PROFILE
Lives in house with  and two children (12 and 15yrs old), 2 steps to enter, bed/bath on 2nd floor up full flight of steps however if needed half bath on 1st floor with couches for sleep/children/spouse

## 2021-07-02 NOTE — PROGRESS NOTE ADULT - ASSESSMENT
50 y/o female w/ a PMHx of Graves disease, asthma, CHRIS (uses dental apparatus), arthritis, LE neuropathy, depression, and herpes simplex w/ persistent oral sores who is POD #1 s/p a scheduled robotic-assisted laparoscopic median arcuate ligament release for persistent nausea, inability to tolerate solid foods, and abdominal pain. Case was complicated by an injury to the base of the celiac axis requiring conversion to an open laparotomy and primary repair by Dr. Law. Patient is reporting to be doing well and denies severe abdominal pain, headache, dizziness, weakness, fevers, chills, shortness of breath, chest pain, or nausea/vomiting.

## 2021-07-02 NOTE — PHYSICAL THERAPY INITIAL EVALUATION ADULT - DID THE PATIENT HAVE SURGERY?
s/p robotic-assisted laparoscopic median arcuate ligament release c/b injury to the base of the celiac axis requiring conversion to an open laparotomy and primary repair./yes

## 2021-07-02 NOTE — PROGRESS NOTE ADULT - SUBJECTIVE AND OBJECTIVE BOX
HISTORY OF PRESENT ILLNESS:  50 y/o female w/ a PMHx of Graves disease, asthma, CHRIS (uses dental apparatus), arthritis, LE neuropathy, depression, and herpes simplex w/ persistent oral sores who is POD #1 s/p a scheduled robotic-assisted laparoscopic median arcuate ligament release for persistent nausea, inability to tolerate solid foods, and abdominal pain. Case was complicated by an injury to the base of the celiac axis requiring conversion to an open laparotomy and primary repair. Patient is reporting to be doing well and denies severe abdominal pain, headache, dizziness, weakness, fevers, chills, shortness of breath, chest pain, or nausea/vomiting.    PAST MEDICAL HISTORY:  - Arthritis  - H/O abdominal pain  - Asthma  - Graves disease  - Cystic fibrosis carrier  - Seasonal allergies  - Frequent sinus infections  - History of 2019 novel coronavirus disease (COVID-19)  - H/O neuropathy  - H/O carpal tunnel syndrome  - H/O abnormal weight loss  - CHRIS (obstructive sleep apnea)    PAST SURGICAL HISTORY:  - H/O:  section  - H/O endoscopy    HOME MEDICATIONS:  - Advair Diskus 250 mcg-50 mcg inhalation powder: 1 puff(s) inhaled once a day  - albuterol 90 mcg/inh inhalation aerosol: 2 puff(s) inhaled every 6 hours, As Needed  - gabapentin 600 mg oral tablet: 1 tab(s) orally once a day (at bedtime)  - Junel Fe  oral tablet: 1 tab(s) orally once a day  - Melatonin 3 mg oral tablet: orally once a day (at bedtime)  - omeprazole 20 mg oral delayed release tablet: 1 tab(s) orally once a day (at bedtime)    ALLERGIES:  - amoxicillin (Hives)  - sulfa drugs (Hives)    FAMILY HISTORY: FH: kidney cancer (Sibling)    SOCIAL HISTORY: Social drinker of 1-2 drinks a month, denies EtOH or illicit substance use    REVIEW OF SYSTEMS: 10 point ROS reviewed and negative except as noted in HPI    VITAL SIGNS:  T(C): 36.4 (2021 23:00), Max: 36.9 (2021 21:35)  T(F): 97.5 (2021 23:00), Max: 98.4 (2021 21:35)  HR: 76 (2021 23:00) (76 - 79)  BP: 109/65 (2021 21:35) (106/72 - 109/65)  BP(mean): 81 (2021 21:35) (81 - 81)  ABP: 141/68 (2021 23:00) (114/68 - 141/68)  ABP(mean): 92 (2021 23:00) (87 - 92)  RR: 13 (2021 23:00) (12 - 18)  SpO2: 96% (2021 23:00) (96% - 100%)    PHYSICAL EXAMINATION:  General - well-nourished, no acute distress  Neuro - awake, alert, oriented x4, no acute focal deficits  HEENT - normocephalic, PERRL, moist mucous membranes  Lungs - clear to auscultation bilaterally  Heart - regular rate and rhythm, S1S2  Abdomen - softly distended, libra-incisional tenderness, incision dressing C/D/I w/ minimal strikethrough  Extremities - all four extremities are warm & pink with 2+ pulses, strength 5/5, sensation intact    LABS:                        13.0   15.07 )-----------( 336      ( 2021 21:56 )             40.0     134<L>  |  102  |  10  ----------------------------<  182<H>  4.4   |  18<L>  |  0.82    Ca    8.5      2021 21:56  Phos  3.9  Mg     2.0    PT/INR - ( 2021 21:56 )   PT: 13.1 sec;   INR: 1.10 ratio    PTT - ( 2021 21:56 )  PTT:26.3 sec    ABG - ( 2021 21:56 )  pH: 7.36  /  pCO2: 38    /  pO2: 126   / HCO3: 21    / Base Excess: -3.6  /  SaO2: 99    /     Lactate: 3.5    POCT Blood Glucose.: 105 mg/dL (2021 11:29)    IMAGING STUDIES: None.

## 2021-07-02 NOTE — PHYSICAL THERAPY INITIAL EVALUATION ADULT - GAIT DEVIATIONS NOTED, PT EVAL
decreased coni/decreased velocity of limb motion/decreased step length/decreased weight-shifting ability

## 2021-07-02 NOTE — OCCUPATIONAL THERAPY INITIAL EVALUATION ADULT - PATIENT PROFILE REVIEW, REHAB EVAL
robotic median arcuate ligament release complicated by bleeding from celiac artery requiring exploratory laparotomy and control/yes

## 2021-07-02 NOTE — PHYSICAL THERAPY INITIAL EVALUATION ADULT - ADDITIONAL COMMENTS
Pt lives in a private house with spouse with one flight of steps inside. Pt was Ind with all ADLs and amb without AD. Pt reports she plans to stay on the  main level of the house

## 2021-07-02 NOTE — CHART NOTE - NSCHARTNOTEFT_GEN_A_CORE
Patient is a 51y old  Female who presents with a chief complaint of s/p 7/1 laparoscopy of median arcuate ligament release c/b bleeding from celiac artery requiring ex lap primary celiac arterial repair (02 Jul 2021 11:01).     Notified by RN that patient SBP upon standing is 68. Patient seen and evaluated at the bedside. Patient repeat vitals upon arrival to room with patient back in bed 122/77, HR 73. Patient reports that she feels dizzy upon standing and the dizziness resolves when she is back in bed. Endorses that she feels like the room is spinning upon standing. Patient reports pain in her abdomen that radiates to her back. Denies chest pain, palpitations, shortness of breath.       Vital Signs Last 24 Hrs  T(C): 36.7 (02 Jul 2021 12:17), Max: 37.4 (02 Jul 2021 07:00)  T(F): 98.1 (02 Jul 2021 12:17), Max: 99.3 (02 Jul 2021 07:00)  HR: 93 (02 Jul 2021 12:17) (71 - 102)  BP: 122/73 (02 Jul 2021 12:17) (109/65 - 162/71)  BP(mean): 95 (02 Jul 2021 11:00) (81 - 111)  RR: 20 (02 Jul 2021 12:17) (12 - 35)  SpO2: 97% (02 Jul 2021 12:17) (92% - 100%)                        11.6   13.86 )-----------( 286      ( 02 Jul 2021 07:43 )             35.3     07-01    134<L>  |  102  |  10  ----------------------------<  182<H>  4.4   |  18<L>  |  0.82    Ca    8.5      01 Jul 2021 21:56  Phos  3.9     07-01  Mg     2.0     07-01      PT/INR - ( 01 Jul 2021 21:56 )   PT: 13.1 sec;   INR: 1.10 ratio         PTT - ( 01 Jul 2021 21:56 )  PTT:26.3 sec    General: WN/WD NAD  Neurology: A&Ox3, nonfocal, PETERSON x 4  Head:  Normocephalic, atraumatic  Respiratory: CTA B/L  CV: RRR, S1S2, no murmur  Abdominal: Soft, NT, ND no palpable mass  MSK: No edema, + peripheral pulses, FROM all 4 extremity    A/P  HPI:  49 y/o female with hx of postprandial abdominal pain with solid food (able to tolerate liquids).  PMHx includes Graves disease s/p Methamizone, now Euthyroid, asthma, CHRIS (uses dental apparatus), arthritis and neuropathy to lower extremities  She was admitted from 5/10/21 to 5/14/21 with worsening abdominal pain and inability to tolerate solid food.  Since discharge she is tolerating liquids, but is still unable to tolerate solids.  She reports 40lb weight loss over the past 2 months.  She complains of bloating, flatus and soft bowel movements.  She denies any nausea, vomiting, diarrhea, constipation.  She is scheduled for robotic assisted laparoscopic median arcuate ligament release on 7/1/21.    She is scheduled for preop Covid swab on 6/29/21.  (22 Jun 2021 14:16)      Kianna Hudson PA-C   [phone]- Patient is a 51y old  Female who presents with a chief complaint of s/p 7/1 laparoscopy of median arcuate ligament release c/b bleeding from celiac artery requiring ex lap primary celiac arterial repair (02 Jul 2021 11:01).     Notified by RN that patient SBP upon standing is 68. Patient seen and evaluated at the bedside. Patient repeat vitals upon arrival to room with patient back in bed 122/77, HR 73. Patient reports that she feels dizzy upon standing and the dizziness resolves when she is back in bed. Endorses that she feels like the room is spinning upon standing. Patient reports pain in her abdomen that radiates to her back. Denies chest pain, palpitations, shortness of breath.       Vital Signs Last 24 Hrs  T(C): 36.7 (02 Jul 2021 12:17), Max: 37.4 (02 Jul 2021 07:00)  T(F): 98.1 (02 Jul 2021 12:17), Max: 99.3 (02 Jul 2021 07:00)  HR: 93 (02 Jul 2021 12:17) (71 - 102)  BP: 122/73 (02 Jul 2021 12:17) (109/65 - 162/71)  BP(mean): 95 (02 Jul 2021 11:00) (81 - 111)  RR: 20 (02 Jul 2021 12:17) (12 - 35)  SpO2: 97% (02 Jul 2021 12:17) (92% - 100%)                        11.6   13.86 )-----------( 286      ( 02 Jul 2021 07:43 )             35.3     07-01    134<L>  |  102  |  10  ----------------------------<  182<H>  4.4   |  18<L>  |  0.82    Ca    8.5      01 Jul 2021 21:56  Phos  3.9     07-01  Mg     2.0     07-01      PT/INR - ( 01 Jul 2021 21:56 )   PT: 13.1 sec;   INR: 1.10 ratio         PTT - ( 01 Jul 2021 21:56 )  PTT:26.3 sec    Physical Examination:  General: NAD, AAO x 3  HEENT: Normocephalic atraumatic  Respiratory: Nonlabored respirations on room air, normal CW expansion.  Cardio: regular rate and rhythm, palpated radial pulse   Abdomen: soft, mild distension, appropriately tender, surgical opt sites are c/d/i. midline incision c/d/i with minimal strikethrough in the distal portion.   Vascular: extremities are warm and well perfused.    A/P  51y female patient S/P robotic median arcuate ligament release c/b celiac artery bleeding converted to an ex lap with repair of celiac artery for median arcuate ligament syndrome. Now with an episode of hypotension    - stat labs, appreciated, stable h/h  - ekg: NSR HR 96   - continue to monitor vital signs   - pain control: Tylenol, Toradol, Oxycodone   - 1L NS bolus  - orthostatic bp after bolus   - Discussed with Dr. Yonny Hudson PA-C  [id number]  [facility]

## 2021-07-02 NOTE — PROGRESS NOTE ADULT - ASSESSMENT
51 y/o female w/ a PMHx of Graves disease, asthma, CHRIS (uses dental apparatus), arthritis, LE neuropathy, depression, and herpes simplex w/ persistent oral sores s/p robotic-assisted laparoscopic median arcuate ligament release c/b injury to the base of the celiac axis requiring conversion to an open laparotomy and primary repair w/ 4-0 Prolene x2 by cardiothoracic surgery requiring hemodynamic monitoring post-operatively    PLAN:    Neuro: acute post-op pain, depression, LE neuropathy  - Multimodal pain control with acetaminophen, oxycodone, Dilaudid, and home gabapentin  - Home citalopram and melatonin    Resp: asthma, CHRIS  - Out of bed to chair, ambulate as tolerated, and incentive spirometry to prevent atelectasis  - Symbicort (takes Advair at home) w/ Duoneb PRN shortness of breath/wheezing    CV: no acute issues  - Monitor vital signs    GI: s/p robotic-assisted laparoscopic median arcuate ligament release c/b injury to the base of the celiac axis requiring conversion to an open laparotomy and primary repair w/ 4-0 Prolene x2  - CLD as tolerated; will advance diet as per primary team  - Bowel regimen with senna & Miralax  - Home Protonix for GERD    Renal: no acute issues  - Monitor I&Os and electrolytes w/ repletions as necessary  - LR at 125 mL/hr until PO intake is adequate    Heme: acute blood loss anemia  - Monitor CBC and coags  - Lovenox for VTE prophylaxis  -  mg daily as per vascular surgery following celiac axis repair    ID: herpes simplex  - Monitor for clinical evidence of active infection  - Home valacyclovir 1000 mg PO daily for herpes simplex    Endo: no acute issues  - Monitor glucose on BMP    Code Status: Full code    Disposition: Will remain in SICU    Trini Reed PA-C     [id number]

## 2021-07-02 NOTE — PROVIDER CONTACT NOTE (OTHER) - BACKGROUND
Pt admitted s/p MALs procedure. Surgery complicated by a nicked cephalic artery
Pt admitted s/p MALS procedure, complicated by a nicked cephalic vein intraop

## 2021-07-02 NOTE — PHYSICAL THERAPY INITIAL EVALUATION ADULT - PASSIVE RANGE OF MOTION EXAMINATION, REHAB EVAL
Date: 09/05/2017   To whom it may concern: This is to certify that: Sharyle Pilsner has been under my care for the following diagnosis:   Diabetes     I feel that she is [place] [address] [facility] [place] for the above mentioned medical reasons       Sincerely,   Jesse Stoddard DO       Electronically signed by : Jesse Stoddard DO; Sep  5 2017 12:08PM EST                       (Author)
no Passive ROM deficits were identified

## 2021-07-02 NOTE — PHYSICAL THERAPY INITIAL EVALUATION ADULT - PERTINENT HX OF CURRENT PROBLEM, REHAB EVAL
Pt is a  52 y/o female admitted to [facility] on 7/1/21 x of postprandial abdominal pain with solid food (able to tolerate liquids).  PMHx includes Graves disease s/p Methamizone, now Euthyroid, asthma, CHRIS (uses dental apparatus), arthritis and neuropathy to lower extremities  She was admitted from 5/10/21 to 5/14/21 with worsening abdominal pain and inability to tolerate solid food.

## 2021-07-02 NOTE — PHYSICAL THERAPY INITIAL EVALUATION ADULT - PRECAUTIONS/LIMITATIONS, REHAB EVAL
Since discharge she is tolerating liquids, but is still unable to tolerate solids.  She reports 40lb weight loss over the past 2 months.  She complains of bloating, flatus and soft bowel movements.

## 2021-07-02 NOTE — CHART NOTE - NSCHARTNOTEFT_GEN_A_CORE
Post Operative Note  Patient: TODD JIMENEZ 51y (1970) Female   MRN: [id number]  Location: [address]  Visit: 07-01-21 Inpatient  Date: 07-02-21 @ 01:50    Procedure: S/P lap median arcuate ligament release c/b celiac artery bleeding converted to an ex lap with repair of celiac artery     Subjective:   Seen and examined at bedside 4hrs postop. No acute events in the immediate postop period. Denies chest pain, SOB, nausea or vomiting. no flatus or bowel movement. no dizziness, lightheadedness or weakness.     Objective:  Vitals: T(F): 97.5 (07-01-21 @ 23:00), Max: 98.4 (07-01-21 @ 21:35)  HR: 79 (07-02-21 @ 00:00)  BP: 109/65 (07-01-21 @ 21:35) (106/72 - 109/65)  RR: 12 (07-02-21 @ 00:00)  SpO2: 97% (07-02-21 @ 00:00)  Vent Settings:     In:   07-01-21 @ 07:01  -  07-02-21 @ 01:50  --------------------------------------------------------  IN: 1600 mL      IV Fluids: lactated ringers. 1000 milliLiter(s) (125 mL/Hr) IV Continuous <Continuous>      Out:   07-01-21 @ 07:01  -  07-02-21 @ 01:50  --------------------------------------------------------  OUT: 265 mL      EBL: 500cc    Voided Urine:   07-01-21 @ 07:01  -  07-02-21 @ 01:50  --------------------------------------------------------  OUT: 265 mL      Sood Catheter: yes         Physical Examination:  General: NAD, resting comfortably in bed  HEENT: Normocephalic atraumatic  Respiratory: Nonlabored respirations, normal CW expansion.  Cardio: S1S2, regular rate and rhythm.  Abdomen: softly distended, appropriately tender, surgical incisions are c/d/i. middle incision c/d/i with minimal strikethrough in the distal portion.   : Sood with clear, yellow urine   Vascular: extremities are warm and well perfused.     Imaging:  No post-op imaging studies    Assessment:  51yFemale patient S/P lap median arcuate ligament release c/b celiac artery bleeding converted to an ex lap with repair of celiac artery for median arcuate ligament syndrome    Plan:  - Pain control PRN  - Diet: NPO, except meds   - IVF   - on home meds  - Activity: as tolerated   - DVT ppx: LVX   - FU labs, replete prn  - Appreciate excellent SICU care     Date/Time: 07-02-21 @ 01:50    [facility]  [id number]

## 2021-07-02 NOTE — PROVIDER CONTACT NOTE (OTHER) - ACTION/TREATMENT ORDERED:
No further interventions at this time, will continue to monitor.
PA Bissetta to bedside, EKG done, STAT CBC, BMP, Mag, Phos, and Type and Screen drawn. Will continue to monitor.

## 2021-07-02 NOTE — CHART NOTE - NSCHARTNOTEFT_GEN_A_CORE
Patient: TODD JIMENEZ 51y (1970) Female   MRN: [id number]  Location: [address]    SUBJECTIVE    Pt Pt denies any CP, SOB, numbness/tingling in b/l limbs, loss of sensation or motor function, n/v/d, fever or chills      HPI:  51 y/o female with hx of postprandial abdominal pain with solid food (able to tolerate liquids).  PMHx includes Graves disease s/p Methamizone, now Euthyroid, asthma, CHRIS (uses dental apparatus), arthritis and neuropathy to lower extremities  She was admitted from 5/10/21 to 5/14/21 with worsening abdominal pain and inability to tolerate solid food.  Since discharge she is tolerating liquids, but is still unable to tolerate solids.  She reports 40lb weight loss over the past 2 months.  She complains of bloating, flatus and soft bowel movements.  She denies any nausea, vomiting, diarrhea, constipation.      Procedure: S/P lap median arcuate ligament release c/b celiac artery bleeding converted to an ex lap with repair of celiac artery on 7/1/2021    Subjective:   Seen and examined at bedside 4hrs postop. No acute events in the immediate postop period. Denies chest pain, SOB, nausea or vomiting. no flatus or bowel movement. no dizziness, lightheadedness or weakness.     Objective:  Vitals: T(F): 97.5 (07-01-21 @ 23:00), Max: 98.4 (07-01-21 @ 21:35)  HR: 79 (07-02-21 @ 00:00)  BP: 109/65 (07-01-21 @ 21:35) (106/72 - 109/65)  RR: 12 (07-02-21 @ 00:00)  SpO2: 97% (07-02-21 @ 00:00)  Vent Settings:     In:   07-01-21 @ 07:01  -  07-02-21 @ 01:50  --------------------------------------------------------  IN: 1600 mL      IV Fluids: lactated ringers. 1000 milliLiter(s) (125 mL/Hr) IV Continuous <Continuous>      Out:   07-01-21 @ 07:01  -  07-02-21 @ 01:50  --------------------------------------------------------  OUT: 265 mL      EBL: 500cc    Voided Urine:   07-01-21 @ 07:01  -  07-02-21 @ 01:50  --------------------------------------------------------  OUT: 265 mL      Sood Catheter: yes         Physical Examination:  General: NAD, resting comfortably in bed  HEENT: Normocephalic atraumatic  Respiratory: Nonlabored respirations, normal CW expansion.  Cardio: S1S2, regular rate and rhythm.  Abdomen: softly distended, appropriately tender, surgical incisions are c/d/i. middle incision c/d/i with minimal strikethrough in the distal portion.   : Sood with clear, yellow urine   Vascular: extremities are warm and well perfused.     Imaging:  No post-op imaging studies    Assessment:  51yFemale patient S/P lap median arcuate ligament release c/b celiac artery bleeding converted to an ex lap with repair of celiac artery for median arcuate ligament syndrome    Plan:  - Pain control PRN  - Diet: NPO, except meds   - IVF   - on home meds  - Activity: as tolerated   - DVT ppx: LVX   - FU labs, replete prn  - Appreciate excellent SICU care     Date/Time: 07-02-21 @ 01:50    [facility]  [id number] Patient: TODD JIMENEZ 51y (1970) Female   MRN: [id number]  Location: [address]      Procedure: S/P lap median arcuate ligament release c/b celiac artery bleeding converted to an ex lap with repair of celiac artery on 7/1/2021    SUBJECTIVE    Pt seen and examined at bedside in SICU. Pt denies any CP, SOB, numbness/tingling in b/l limbs, loss of sensation or motor function, n/v/d, fever or chills    Objective:    Physical Examination:  General: NAD, resting comfortably in bed  HEENT: Normocephalic atraumatic  Respiratory: Nonlabored respirations, normal CW expansion.  Cardio: S1S2, regular rate and rhythm.  Abdomen: softly distended, appropriately tender, surgical incisions are c/d/i. middle incision c/d/i with minimal strikethrough in the distal portion.   : Sood with clear, yellow urine   Vascular: extremities are warm and well perfused.     Vital Signs Last 24 Hrs  T(C): 37.4 (02 Jul 2021 07:00), Max: 37.4 (02 Jul 2021 07:00)  T(F): 99.3 (02 Jul 2021 07:00), Max: 99.3 (02 Jul 2021 07:00)  HR: 99 (02 Jul 2021 11:00) (71 - 102)  BP: 150/73 (02 Jul 2021 11:00) (106/72 - 162/71)  BP(mean): 95 (02 Jul 2021 11:00) (81 - 111)  RR: 23 (02 Jul 2021 11:00) (12 - 35)  SpO2: 95% (02 Jul 2021 11:00) (92% - 100%)    MEDICATIONS  (STANDING):  acetaminophen  IVPB .. 1000 milliGRAM(s) IV Intermittent every 6 hours  aspirin enteric coated 81 milliGRAM(s) Oral daily  budesonide 160 MICROgram(s)/formoterol 4.5 MICROgram(s) Inhaler 2 Puff(s) Inhalation two times a day  chlorhexidine 2% Cloths 1 Application(s) Topical <User Schedule>  citalopram 20 milliGRAM(s) Oral at bedtime  enoxaparin Injectable 40 milliGRAM(s) SubCutaneous every 24 hours  gabapentin 600 milliGRAM(s) Oral at bedtime  loratadine 10 milliGRAM(s) Oral at bedtime  melatonin 3 milliGRAM(s) Oral at bedtime  pantoprazole    Tablet 40 milliGRAM(s) Oral before breakfast  polyethylene glycol 3350 17 Gram(s) Oral daily  senna 2 Tablet(s) Oral at bedtime  valACYclovir 1000 milliGRAM(s) Oral daily    I&O's Summary    01 Jul 2021 07:01  -  02 Jul 2021 07:00  --------------------------------------------------------  IN: 2350 mL / OUT: 905 mL / NET: 1445 mL    02 Jul 2021 07:01  -  02 Jul 2021 11:28  --------------------------------------------------------  IN: 375 mL / OUT: 440 mL / NET: -65 mL                          11.6   13.86 )-----------( 286      ( 02 Jul 2021 07:43 )             35.3       07-01    134<L>  |  102  |  10  ----------------------------<  182<H>  4.4   |  18<L>  |  0.82    Ca    8.5      01 Jul 2021 21:56  Phos  3.9     07-01  Mg     2.0     07-01      Assessment:  51y female S/P lap median arcuate ligament release c/b celiac artery bleeding converted to an ex lap with repair of celiac artery for median arcuate ligament syndrome on 7/1. Currently in SICU.        Plan:  - start on ASA 81  - Pain control PRN  - Diet: Advance as tolerated  - IVF   - on home meds  - Activity: as tolerated   - DVT ppx: LVX   - FU labs, replete prn  - Appreciate excellent SICU care       Erin Robertson, PGY-1  Vascular Surgery  Pager # [phone]

## 2021-07-02 NOTE — OCCUPATIONAL THERAPY INITIAL EVALUATION ADULT - VISUAL ASSESSMENT: EYE MUSCLE BALANCE
normal Melolabial Interpolation Flap Text: A decision was made to reconstruct the defect utilizing an interpolation axial flap and a staged reconstruction.  A telfa template was made of the defect.  This telfa template was then used to outline the melolabial interpolation flap.  The donor area for the pedicle flap was then injected with anesthesia.  The flap was excised through the skin and subcutaneous tissue down to the layer of the underlying musculature.  The pedicle flap was carefully excised within this deep plane to maintain its blood supply.  The edges of the donor site were undermined.   The donor site was closed in a primary fashion.  The pedicle was then rotated into position and sutured.  Once the tube was sutured into place, adequate blood supply was confirmed with blanching and refill.  The pedicle was then wrapped with xeroform gauze and dressed appropriately with a telfa and gauze bandage to ensure continued blood supply and protect the attached pedicle.

## 2021-07-02 NOTE — PROGRESS NOTE ADULT - SUBJECTIVE AND OBJECTIVE BOX
STATUS POST:  lap median arcuate ligament release c/b celiac artery bleeding converted to an ex lap with repair of celiac artery     POST OPERATIVE DAY #: 1      SUBJECTIVE:   Seen and examined at bedside. no acute events overnight.     OBJECTIVE: T(C): 36.5 (07-02-21 @ 03:00), Max: 36.9 (07-01-21 @ 21:35)  HR: 79 (07-02-21 @ 03:00) (76 - 91)  BP: 109/65 (07-01-21 @ 21:35) (106/72 - 109/65)  RR: 15 (07-02-21 @ 03:00) (12 - 20)  SpO2: 95% (07-02-21 @ 03:00) (95% - 100%)  Wt(kg): --  I&O's Summary    01 Jul 2021 07:01  -  02 Jul 2021 04:07  --------------------------------------------------------  IN: 1850 mL / OUT: 335 mL / NET: 1515 mL      I&O's Detail    01 Jul 2021 07:01  -  02 Jul 2021 04:07  --------------------------------------------------------  IN:    IV PiggyBack: 100 mL    Lactated Ringers: 750 mL    Lactated Ringers Bolus: 1000 mL  Total IN: 1850 mL    OUT:    Voided (mL): 335 mL  Total OUT: 335 mL    Total NET: 1515 mL        Physical Examination:  General: NAD, resting comfortably in bed  HEENT: Normocephalic atraumatic  Respiratory: Nonlabored respirations, normal CW expansion.  Cardio: S1S2, regular rate and rhythm.  Abdomen: softly distended, appropriately tender, surgical incisions are c/d/i. middle incision c/d/i with minimal strikethrough in the distal portion.   : Sood with clear, yellow urine   Vascular: extremities are warm and well perfused.    MEDICATIONS  (STANDING):  acetaminophen  IVPB .. 1000 milliGRAM(s) IV Intermittent every 6 hours  aspirin enteric coated 325 milliGRAM(s) Oral daily  budesonide 160 MICROgram(s)/formoterol 4.5 MICROgram(s) Inhaler 2 Puff(s) Inhalation two times a day  calcium gluconate IVPB 2 Gram(s) IV Intermittent once  chlorhexidine 2% Cloths 1 Application(s) Topical <User Schedule>  citalopram 20 milliGRAM(s) Oral at bedtime  enoxaparin Injectable 40 milliGRAM(s) SubCutaneous every 24 hours  gabapentin 600 milliGRAM(s) Oral at bedtime  lactated ringers. 1000 milliLiter(s) (125 mL/Hr) IV Continuous <Continuous>  loratadine 10 milliGRAM(s) Oral at bedtime  melatonin 3 milliGRAM(s) Oral at bedtime  pantoprazole    Tablet 40 milliGRAM(s) Oral before breakfast  polyethylene glycol 3350 17 Gram(s) Oral daily  senna 2 Tablet(s) Oral at bedtime  valACYclovir 1000 milliGRAM(s) Oral daily    MEDICATIONS  (PRN):  albuterol/ipratropium for Nebulization 3 milliLiter(s) Nebulizer every 6 hours PRN Shortness of Breath and/or Wheezing  HYDROmorphone  Injectable 0.5 milliGRAM(s) IV Push every 3 hours PRN Severe Breakthrough Pain  oxyCODONE    IR 10 milliGRAM(s) Oral every 6 hours PRN Severe Pain (7 - 10)  oxyCODONE    IR 5 milliGRAM(s) Oral every 4 hours PRN Moderate Pain (4 - 6)      LABS:                        12.3   14.66 )-----------( 221      ( 02 Jul 2021 02:16 )             37.5     07-01    134<L>  |  102  |  10  ----------------------------<  182<H>  4.4   |  18<L>  |  0.82    Ca    8.5      01 Jul 2021 21:56  Phos  3.9     07-01  Mg     2.0     07-01      PT/INR - ( 01 Jul 2021 21:56 )   PT: 13.1 sec;   INR: 1.10 ratio         PTT - ( 01 Jul 2021 21:56 )  PTT:26.3 sec      RADIOLOGY & ADDITIONAL STUDIES:         STATUS POST:  robotic median arcuate ligament release c/b celiac artery bleeding converted to an ex lap with repair of celiac artery     POST OPERATIVE DAY #: 1      SUBJECTIVE:   Seen and examined at bedside. no acute events overnight.     OBJECTIVE: T(C): 36.5 (07-02-21 @ 03:00), Max: 36.9 (07-01-21 @ 21:35)  HR: 79 (07-02-21 @ 03:00) (76 - 91)  BP: 109/65 (07-01-21 @ 21:35) (106/72 - 109/65)  RR: 15 (07-02-21 @ 03:00) (12 - 20)  SpO2: 95% (07-02-21 @ 03:00) (95% - 100%)  Wt(kg): --  I&O's Summary    01 Jul 2021 07:01  -  02 Jul 2021 04:07  --------------------------------------------------------  IN: 1850 mL / OUT: 335 mL / NET: 1515 mL      I&O's Detail    01 Jul 2021 07:01  -  02 Jul 2021 04:07  --------------------------------------------------------  IN:    IV PiggyBack: 100 mL    Lactated Ringers: 750 mL    Lactated Ringers Bolus: 1000 mL  Total IN: 1850 mL    OUT:    Voided (mL): 335 mL  Total OUT: 335 mL    Total NET: 1515 mL        Physical Examination:  General: NAD, resting comfortably in bed  HEENT: Normocephalic atraumatic  Respiratory: Nonlabored respirations, normal CW expansion.  Cardio: S1S2, regular rate and rhythm.  Abdomen: softly distended, appropriately tender, surgical incisions are c/d/i. middle incision c/d/i with minimal strikethrough in the distal portion.   : Sood with clear, yellow urine   Vascular: extremities are warm and well perfused.    MEDICATIONS  (STANDING):  acetaminophen  IVPB .. 1000 milliGRAM(s) IV Intermittent every 6 hours  aspirin enteric coated 325 milliGRAM(s) Oral daily  budesonide 160 MICROgram(s)/formoterol 4.5 MICROgram(s) Inhaler 2 Puff(s) Inhalation two times a day  calcium gluconate IVPB 2 Gram(s) IV Intermittent once  chlorhexidine 2% Cloths 1 Application(s) Topical <User Schedule>  citalopram 20 milliGRAM(s) Oral at bedtime  enoxaparin Injectable 40 milliGRAM(s) SubCutaneous every 24 hours  gabapentin 600 milliGRAM(s) Oral at bedtime  lactated ringers. 1000 milliLiter(s) (125 mL/Hr) IV Continuous <Continuous>  loratadine 10 milliGRAM(s) Oral at bedtime  melatonin 3 milliGRAM(s) Oral at bedtime  pantoprazole    Tablet 40 milliGRAM(s) Oral before breakfast  polyethylene glycol 3350 17 Gram(s) Oral daily  senna 2 Tablet(s) Oral at bedtime  valACYclovir 1000 milliGRAM(s) Oral daily    MEDICATIONS  (PRN):  albuterol/ipratropium for Nebulization 3 milliLiter(s) Nebulizer every 6 hours PRN Shortness of Breath and/or Wheezing  HYDROmorphone  Injectable 0.5 milliGRAM(s) IV Push every 3 hours PRN Severe Breakthrough Pain  oxyCODONE    IR 10 milliGRAM(s) Oral every 6 hours PRN Severe Pain (7 - 10)  oxyCODONE    IR 5 milliGRAM(s) Oral every 4 hours PRN Moderate Pain (4 - 6)      LABS:                        12.3   14.66 )-----------( 221      ( 02 Jul 2021 02:16 )             37.5     07-01    134<L>  |  102  |  10  ----------------------------<  182<H>  4.4   |  18<L>  |  0.82    Ca    8.5      01 Jul 2021 21:56  Phos  3.9     07-01  Mg     2.0     07-01      PT/INR - ( 01 Jul 2021 21:56 )   PT: 13.1 sec;   INR: 1.10 ratio         PTT - ( 01 Jul 2021 21:56 )  PTT:26.3 sec      RADIOLOGY & ADDITIONAL STUDIES:

## 2021-07-02 NOTE — PROGRESS NOTE ADULT - ASSESSMENT
Assessment:  51yFemale patient S/P lap median arcuate ligament release c/b celiac artery bleeding converted to an ex lap with repair of celiac artery for median arcuate ligament syndrome    Plan:  - Pain control PRN  - Diet: NPO, except meds   - IVF   - on home meds  - Activity: as tolerated   - DVT ppx: LVX   - FU labs, replete prn  - Appreciate excellent SICU care     [facility]  [id number]. Assessment:  51yFemale patient S/P robotic median arcuate ligament release c/b celiac artery bleeding converted to an ex lap with repair of celiac artery for median arcuate ligament syndrome    Plan:  - Pain control PRN  - Diet: regular   - IVF   - on home meds  - Activity: as tolerated   - DVT ppx: LVX   - FU labs, replete prn  - Appreciate excellent SICU care     [facility]  [id number].

## 2021-07-02 NOTE — PROGRESS NOTE ADULT - ASSESSMENT
Assessment and Plan:   · Assessment	  50 y/o female w/ a PMHx of Graves disease, asthma, CHRIS (uses dental apparatus), arthritis, LE neuropathy, depression, and herpes simplex w/ persistent oral sores. POD #1 s/p a scheduled robotic-assisted laparoscopic median arcuate ligament release for persistent nausea, inability to tolerate solid foods, and abdominal pain. Case was complicated by an injury to the base of the celiac axis requiring conversion to an open laparotomy and primary repair by Dr. Law. Patient is reporting to be doing well and denies severe abdominal pain, headache, dizziness, weakness, fevers, chills, shortness of breath, chest pain, or nausea/vomiting.              Plan - 1:   Assessment and Plan:   · Assessment	  52 y/o female w/ a PMHx of Graves disease, asthma, CHRIS (uses dental apparatus), arthritis, LE neuropathy, depression, and herpes simplex w/ persistent oral sores. POD #1 s/p a scheduled robotic-assisted laparoscopic median arcuate ligament release for persistent nausea, inability to tolerate solid foods, and abdominal pain. Case was complicated by an injury to the base of the celiac axis requiring conversion to an open laparotomy and primary repair by Dr. Law. Patient is reporting to be doing well and denies severe abdominal pain, headache, dizziness, weakness, fevers, chills, shortness of breath, chest pain, or nausea/vomiting.              Plan  - Monitor for vital signs, abdominal pain, nausea   - Diet: regular  - Pain control IV Tylenol, PRN oxy  - OOB and ambulating as tolerated  - PT dispo: no needs  - F/u AM labs     Assessment and Plan:   · Assessment	  50 y/o female w/ a PMHx of Graves disease, asthma, CHRIS (uses dental apparatus), arthritis, LE neuropathy, depression, and herpes simplex w/ persistent oral sores. POD #1 s/p a scheduled robotic-assisted laparoscopic median arcuate ligament release for persistent nausea, inability to tolerate solid foods, and abdominal pain. Case was complicated by an injury to the base of the celiac axis requiring conversion to an open laparotomy and primary repair by Dr. Law. Patient is reporting to be doing well and denies severe abdominal pain, headache, dizziness, weakness, fevers, chills, shortness of breath, chest pain, or nausea/vomiting.    Plan  - Monitor for vital signs, abdominal pain, nausea   - Diet: regular  - Pain control IV Tylenol, PRN oxy  - OOB and ambulating as tolerated  - PT dispo: no needs  - F/u AM labs     Assessment and Plan:   · Assessment	  50 y/o female w/ a PMHx of Graves disease, asthma, CHRIS (uses dental apparatus), arthritis, LE neuropathy, depression, and herpes simplex w/ persistent oral sores. POD #1 s/p a scheduled robotic-assisted laparoscopic median arcuate ligament release for persistent nausea, inability to tolerate solid foods, and abdominal pain. Case was complicated by bleeding requiring conversion to an open laparotomy and primary repair by Dr. Law. Patient is reporting to be doing well and denies severe abdominal pain, headache, dizziness, weakness, fevers, chills, shortness of breath, chest pain, or nausea/vomiting.    Plan  - Monitor for vital signs, abdominal pain, nausea   - Diet: regular  - Pain control IV Tylenol, PRN oxy  - OOB and ambulating as tolerated  - PT dispo: no needs  - F/u AM labs

## 2021-07-02 NOTE — PROGRESS NOTE ADULT - SUBJECTIVE AND OBJECTIVE BOX
Transfer Note    SUBJECTIVE: Pt seen and examined at bedside. Patient comfortable and in no-apparent distress. No nausea, vomiting, diarrhea. Pain is controlled. +Gas/+BM. Tolerating diet.    Vital Signs Last 24 Hrs  T(C): 36.7 (02 Jul 2021 12:17), Max: 37.4 (02 Jul 2021 07:00)  T(F): 98.1 (02 Jul 2021 12:17), Max: 99.3 (02 Jul 2021 07:00)  HR: 93 (02 Jul 2021 12:17) (71 - 102)  BP: 122/73 (02 Jul 2021 12:17) (106/72 - 162/71)  BP(mean): 95 (02 Jul 2021 11:00) (81 - 111)  RR: 20 (02 Jul 2021 12:17) (12 - 35)  SpO2: 97% (02 Jul 2021 12:17) (92% - 100%)    Physical Exam:  General Appearance: Appears well, NAD  Respiratory: No labored breathing  CV: Pulse regularly present  Abdomen: Soft, nontender, ***incision c/d/i    LABS:                        11.6   13.86 )-----------( 286      ( 02 Jul 2021 07:43 )             35.3     07-01    134<L>  |  102  |  10  ----------------------------<  182<H>  4.4   |  18<L>  |  0.82    Ca    8.5      01 Jul 2021 21:56  Phos  3.9     07-01  Mg     2.0     07-01      PT/INR - ( 01 Jul 2021 21:56 )   PT: 13.1 sec;   INR: 1.10 ratio         PTT - ( 01 Jul 2021 21:56 )  PTT:26.3 sec      INs and OUTs:    07-01-21 @ 07:01  -  07-02-21 @ 07:00  --------------------------------------------------------  IN: 2350 mL / OUT: 905 mL / NET: 1445 mL    07-02-21 @ 07:01  -  07-02-21 @ 12:36  --------------------------------------------------------  IN: 700 mL / OUT: 440 mL / NET: 260 mL     Transfer Note    POD #1 s/p a scheduled robotic-assisted laparoscopic median arcuate ligament release for persistent nausea, inability to tolerate solid foods, and abdominal pain. Case was complicated by an injury to the base of the celiac axis requiring conversion to an open laparotomy and primary repair.     SUBJECTIVE: Pt seen and examined at bedside. Patient comfortable and in no-apparent distress. No nausea, vomiting, diarrhea. Pain is controlled. +Gas/+BM. Tolerating diet.    Vital Signs Last 24 Hrs  T(C): 36.7 (02 Jul 2021 12:17), Max: 37.4 (02 Jul 2021 07:00)  T(F): 98.1 (02 Jul 2021 12:17), Max: 99.3 (02 Jul 2021 07:00)  HR: 93 (02 Jul 2021 12:17) (71 - 102)  BP: 122/73 (02 Jul 2021 12:17) (106/72 - 162/71)  BP(mean): 95 (02 Jul 2021 11:00) (81 - 111)  RR: 20 (02 Jul 2021 12:17) (12 - 35)  SpO2: 97% (02 Jul 2021 12:17) (92% - 100%)    Physical Exam:  General Appearance: Appears well, NAD  Respiratory: No labored breathing  CV: Pulse regularly present  Abdomen: Soft, nontender, ***incision c/d/i    LABS:                        11.6   13.86 )-----------( 286      ( 02 Jul 2021 07:43 )             35.3     07-01    134<L>  |  102  |  10  ----------------------------<  182<H>  4.4   |  18<L>  |  0.82    Ca    8.5      01 Jul 2021 21:56  Phos  3.9     07-01  Mg     2.0     07-01      PT/INR - ( 01 Jul 2021 21:56 )   PT: 13.1 sec;   INR: 1.10 ratio         PTT - ( 01 Jul 2021 21:56 )  PTT:26.3 sec      INs and OUTs:    07-01-21 @ 07:01  -  07-02-21 @ 07:00  --------------------------------------------------------  IN: 2350 mL / OUT: 905 mL / NET: 1445 mL    07-02-21 @ 07:01  -  07-02-21 @ 12:36  --------------------------------------------------------  IN: 700 mL / OUT: 440 mL / NET: 260 mL     Transfer Note    Procedure: Robotic-assisted laparoscopic median arcuate ligament release. POD #1. Case was complicated by an injury to the base of the celiac axis requiring conversion to an open laparotomy and primary repair. Pt stayed in SICU after surgery. Patient is doing well and denies severe abdominal pain, headache, dizziness, weakness, fevers, chills, shortness of breath, chest pain, or nausea/vomiting. Pt is transferred to the floor.              SUBJECTIVE: Pt seen and examined at bedside. Patient comfortable and in no-apparent distress. No nausea, vomiting, diarrhea. Pain is controlled. +Gas/+BM. Tolerating diet.    Vital Signs Last 24 Hrs  T(C): 36.7 (02 Jul 2021 12:17), Max: 37.4 (02 Jul 2021 07:00)  T(F): 98.1 (02 Jul 2021 12:17), Max: 99.3 (02 Jul 2021 07:00)  HR: 93 (02 Jul 2021 12:17) (71 - 102)  BP: 122/73 (02 Jul 2021 12:17) (106/72 - 162/71)  BP(mean): 95 (02 Jul 2021 11:00) (81 - 111)  RR: 20 (02 Jul 2021 12:17) (12 - 35)  SpO2: 97% (02 Jul 2021 12:17) (92% - 100%)    Physical Exam:  General Appearance: Appears well, NAD  Respiratory: No labored breathing  CV: Pulse regularly present  Abdomen: Soft, nontender, ***incision c/d/i    LABS:                        11.6   13.86 )-----------( 286      ( 02 Jul 2021 07:43 )             35.3     07-01    134<L>  |  102  |  10  ----------------------------<  182<H>  4.4   |  18<L>  |  0.82    Ca    8.5      01 Jul 2021 21:56  Phos  3.9     07-01  Mg     2.0     07-01      PT/INR - ( 01 Jul 2021 21:56 )   PT: 13.1 sec;   INR: 1.10 ratio         PTT - ( 01 Jul 2021 21:56 )  PTT:26.3 sec      INs and OUTs:    07-01-21 @ 07:01 - 07-02-21 @ 07:00  --------------------------------------------------------  IN: 2350 mL / OUT: 905 mL / NET: 1445 mL    07-02-21 @ 07:01  -  07-02-21 @ 12:36  --------------------------------------------------------  IN: 700 mL / OUT: 440 mL / NET: 260 mL     Transfer Note    Procedure: Robotic-assisted laparoscopic median arcuate ligament release. POD #1. Case was complicated by an injury to the base of the celiac axis requiring conversion to an open laparotomy and primary repair. Pt stayed in SICU after surgery. Patient is doing well and denies severe abdominal pain, headache, dizziness, weakness, fevers, chills, shortness of breath, chest pain, or nausea/vomiting. Pt is transferred to the floor.              SUBJECTIVE: Pt seen and examined at bedside. Patient comfortable and in no-apparent distress. No nausea, vomiting, diarrhea. Pain is controlled. -Gas/-BM. Tolerating diet.    Vital Signs Last 24 Hrs  T(C): 36.7 (02 Jul 2021 12:17), Max: 37.4 (02 Jul 2021 07:00)  T(F): 98.1 (02 Jul 2021 12:17), Max: 99.3 (02 Jul 2021 07:00)  HR: 93 (02 Jul 2021 12:17) (71 - 102)  BP: 122/73 (02 Jul 2021 12:17) (106/72 - 162/71)  BP(mean): 95 (02 Jul 2021 11:00) (81 - 111)  RR: 20 (02 Jul 2021 12:17) (12 - 35)  SpO2: 97% (02 Jul 2021 12:17) (92% - 100%)    Physical Exam:  General Appearance: Appears well, NAD  Respiratory: No labored breathing  CV: Pulse regularly present  Abdomen: Soft, nontender, ***incision c/d/i    LABS:                        11.6   13.86 )-----------( 286      ( 02 Jul 2021 07:43 )             35.3     07-01    134<L>  |  102  |  10  ----------------------------<  182<H>  4.4   |  18<L>  |  0.82    Ca    8.5      01 Jul 2021 21:56  Phos  3.9     07-01  Mg     2.0     07-01      PT/INR - ( 01 Jul 2021 21:56 )   PT: 13.1 sec;   INR: 1.10 ratio         PTT - ( 01 Jul 2021 21:56 )  PTT:26.3 sec      INs and OUTs:    07-01-21 @ 07:01 - 07-02-21 @ 07:00  --------------------------------------------------------  IN: 2350 mL / OUT: 905 mL / NET: 1445 mL    07-02-21 @ 07:01  -  07-02-21 @ 12:36  --------------------------------------------------------  IN: 700 mL / OUT: 440 mL / NET: 260 mL     Transfer Note    Procedure: Robotic-assisted laparoscopic median arcuate ligament release. POD #1. Case was complicated by bleeding requiring conversion to an open laparotomy and primary repair. Pt stayed in SICU after surgery. Patient is doing well and denies severe abdominal pain, headache, dizziness, weakness, fevers, chills, shortness of breath, chest pain, or nausea/vomiting. Pt is transferred to the floor.              SUBJECTIVE: Pt seen and examined at bedside. Patient comfortable and in no-apparent distress. No nausea, vomiting, diarrhea. Pain is controlled. -Gas/-BM. Tolerating diet.    Vital Signs Last 24 Hrs  T(C): 36.7 (02 Jul 2021 12:17), Max: 37.4 (02 Jul 2021 07:00)  T(F): 98.1 (02 Jul 2021 12:17), Max: 99.3 (02 Jul 2021 07:00)  HR: 93 (02 Jul 2021 12:17) (71 - 102)  BP: 122/73 (02 Jul 2021 12:17) (106/72 - 162/71)  BP(mean): 95 (02 Jul 2021 11:00) (81 - 111)  RR: 20 (02 Jul 2021 12:17) (12 - 35)  SpO2: 97% (02 Jul 2021 12:17) (92% - 100%)    Physical Exam:  General Appearance: Appears well, NAD  Respiratory: No labored breathing  CV: Pulse regularly present  Abdomen: Soft, nontender, ***incision c/d/i    LABS:                        11.6   13.86 )-----------( 286      ( 02 Jul 2021 07:43 )             35.3     07-01    134<L>  |  102  |  10  ----------------------------<  182<H>  4.4   |  18<L>  |  0.82    Ca    8.5      01 Jul 2021 21:56  Phos  3.9     07-01  Mg     2.0     07-01      PT/INR - ( 01 Jul 2021 21:56 )   PT: 13.1 sec;   INR: 1.10 ratio         PTT - ( 01 Jul 2021 21:56 )  PTT:26.3 sec      INs and OUTs:    07-01-21 @ 07:01  -  07-02-21 @ 07:00  --------------------------------------------------------  IN: 2350 mL / OUT: 905 mL / NET: 1445 mL    07-02-21 @ 07:01  -  07-02-21 @ 12:36  --------------------------------------------------------  IN: 700 mL / OUT: 440 mL / NET: 260 mL

## 2021-07-02 NOTE — PROGRESS NOTE ADULT - PROBLEM SELECTOR PLAN 1
6/30- laparoscopic median arcuate ligament release c/b celiac artery bleed requiring ex lap primary celiac artery repair by Dr. Law    7/1  c/w care per SICU team

## 2021-07-02 NOTE — PROGRESS NOTE ADULT - SUBJECTIVE AND OBJECTIVE BOX
HISTORY:  51 y/o female w/ a PMHx of Graves disease, asthma, CHRIS (uses dental apparatus), arthritis, LE neuropathy, depression, and herpes simplex w/ persistent oral sores who presented on 7/1 for a scheduled robotic-assisted laparoscopic median arcuate ligament release for persistent nausea, inability to tolerate solid foods, and abdominal pain. Case was complicated by an injury to the base of the celiac axis requiring conversion to an open laparotomy and primary repair w/ 4-0 Prolene x2 by cardiothoracic & vascular surgery. Patient was extubated at the end of the case and admitted to SICU post-operatively for hemodynamic monitoring. Patient is reporting incisional abdominal pain but otherwise denies headache, dizziness, weakness, fevers, chills, shortness of breath, chest pain, or nausea/vomiting.    OVERNIGHT EVENTS: Lactate    SUBJECTIVE/ROS: A ten-point review of systems was otherwise negative except as noted.    NEURO  Exam: awake, alert, oriented x4, no acute distress  Meds:  - acetaminophen  IVPB .. 1000 milliGRAM(s) IV Intermittent every 6 hours  - citalopram 20 milliGRAM(s) Oral at bedtime  - gabapentin 600 milliGRAM(s) Oral at bedtime  - HYDROmorphone  Injectable 0.5 milliGRAM(s) IV Push every 3 hours PRN Severe Breakthrough Pain  - melatonin 3 milliGRAM(s) Oral at bedtime  - oxyCODONE    IR 10 milliGRAM(s) Oral every 6 hours PRN Severe Pain (7 - 10)  - oxyCODONE    IR 5 milliGRAM(s) Oral every 4 hours PRN Moderate Pain (4 - 6)    RESPIRATORY  RR: 12 (07-02-21 @ 00:00) (12 - 18)  SpO2: 97% (07-02-21 @ 00:00) (96% - 100%)  Exam: clear to auscultation bilaterally  ABG - ( 01 Jul 2021 21:56 )  pH: 7.36  /  pCO2: 38    /  pO2: 126   / HCO3: 21    / Base Excess: -3.6  /  SaO2: 99    /   Lactate: 3.5  Meds:  - albuterol/ipratropium for Nebulization 3 milliLiter(s) Nebulizer every 6 hours PRN Shortness of Breath and/or Wheezing  - budesonide 160 MICROgram(s)/formoterol 4.5 MICROgram(s) Inhaler 2 Puff(s) Inhalation two times a day  - loratadine 10 milliGRAM(s) Oral at bedtime    CARDIOVASCULAR  HR: 79 (07-02-21 @ 00:00) (76 - 79)  BP: 109/65 (07-01-21 @ 21:35) (106/72 - 109/65)  BP(mean): 81 (07-01-21 @ 21:35) (81 - 81)  ABP: 147/73 (07-02-21 @ 00:00) (114/68 - 147/73)  ABP(mean): 99 (07-02-21 @ 00:00) (87 - 99)  Exam: regular rate and rhythm  Cardiac Rhythm: sinus  Perfusion    [x]Adequate    [ ]Inadequate  Mentation   [x]Normal       [ ]Reduced  Extremities  [x]Warm         [ ]Cool  Volume Status [ ]Hypervolemic [x]Euvolemic [ ]Hypovolemic    GI/NUTRITION  Exam: softly distended, libra-incisional tenderness, incision dressing C/D/I  Diet: NPO except medications  Meds:  - pantoprazole    Tablet 40 milliGRAM(s) Oral before breakfast  - polyethylene glycol 3350 17 Gram(s) Oral daily  - senna 2 Tablet(s) Oral at bedtime    GENITOURINARY  I&O's Detail    07-01 @ 07:01  -  07-02 @ 01:31  --------------------------------------------------------  IN:    IV PiggyBack: 100 mL    Lactated Ringers: 500 mL    Lactated Ringers Bolus: 1000 mL  Total IN: 1600 mL    OUT:    Voided (mL): 265 mL  Total OUT: 265 mL    Total NET: 1335 mL    134<L>  |  102  |  10  ----------------------------<  182<H>  4.4   |  18<L>  |  0.82    Ca    8.5      01 Jul 2021 21:56  Phos  3.9  Mg     2.0    [x] Sood catheter, indication: Urine Output Monitoring in Critically Ill  Meds: lactated ringers infuse at 125 mL/hr    HEMATOLOGIC  Meds:  - aspirin enteric coated 325 milliGRAM(s) Oral daily  - enoxaparin Injectable 40 milliGRAM(s) SubCutaneous every 24 hours                        13.0   15.07 )-----------( 336      ( 01 Jul 2021 21:56 )             40.0     PT/INR - ( 01 Jul 2021 21:56 )   PT: 13.1 sec;   INR: 1.10 ratio    PTT - ( 01 Jul 2021 21:56 )  PTT:26.3 sec    INFECTIOUS DISEASES  T(C): 36.4 (07-01-21 @ 23:00), Max: 36.9 (07-01-21 @ 21:35)  WBC Count: 15.07 K/uL (07-01 @ 21:56)  Meds: valACYclovir 1000 milliGRAM(s) Oral daily    ENDOCRINE  Capillary Blood Glucose: 182 mg/dL (07-01-21 @ 21:56)    ACCESS DEVICES:  [x] Peripheral IV  [ ] Central Venous Line	[ ] R	[ ] L	[ ] IJ	[ ] Fem	[ ] SC	Placed:   [x] Arterial Line		[ ] R	[x] L	[ ] Fem	[x] Rad	[ ] Ax	Placed: 7/1  [ ] PICC:					[ ] Mediport  [x] Urinary Catheter, Date Placed: 7/1  [x] Necessity of urinary, arterial, and venous catheters discussed    OTHER MEDICATIONS: chlorhexidine 2% Cloths 1 Application(s) Topical <User Schedule>

## 2021-07-02 NOTE — PHYSICAL THERAPY INITIAL EVALUATION ADULT - RISK REDUCTION/PREVENTION, PT EVAL
Problem: Pain  Goal: #Acceptable pain level achieved/maintained at rest using NRS/Faces  This goal is used for patients who can self-report.  Acceptable means the level is at or below the identified comfort/function goal.  Outcome: Outcome Met, Continue evaluating goal progress toward completion  Patient rating pain as acceptable. No PRN medications given       risk factors

## 2021-07-02 NOTE — PROVIDER CONTACT NOTE (OTHER) - ASSESSMENT
Pt refusing Symbicort. Pt states that it makes he feel hot and her cheeks flushed. Pt educated on reasoning of medication and risks of not taking it, pt states understanding and still refusing.
Pt OOB attempting to use bathroom, upon standing pt states she feels dizzy and lightheaded and may pass out. Pt hypotensive and had to sit down before getting a full set of vitals. Upon sitting pt states symptoms are reliving. VSS normalized and by 122/77. No visible signs of bleeding. Pt resting comfortably in bed at this time.

## 2021-07-03 LAB
ANION GAP SERPL CALC-SCNC: 16 MMOL/L — SIGNIFICANT CHANGE UP (ref 5–17)
BUN SERPL-MCNC: 7 MG/DL — SIGNIFICANT CHANGE UP (ref 7–23)
CALCIUM SERPL-MCNC: 8.7 MG/DL — SIGNIFICANT CHANGE UP (ref 8.4–10.5)
CHLORIDE SERPL-SCNC: 101 MMOL/L — SIGNIFICANT CHANGE UP (ref 96–108)
CO2 SERPL-SCNC: 20 MMOL/L — LOW (ref 22–31)
CREAT SERPL-MCNC: 0.85 MG/DL — SIGNIFICANT CHANGE UP (ref 0.5–1.3)
GLUCOSE SERPL-MCNC: 106 MG/DL — HIGH (ref 70–99)
HCT VFR BLD CALC: 35.6 % — SIGNIFICANT CHANGE UP (ref 34.5–45)
HGB BLD-MCNC: 11.4 G/DL — LOW (ref 11.5–15.5)
MAGNESIUM SERPL-MCNC: 2 MG/DL — SIGNIFICANT CHANGE UP (ref 1.6–2.6)
MCHC RBC-ENTMCNC: 30.6 PG — SIGNIFICANT CHANGE UP (ref 27–34)
MCHC RBC-ENTMCNC: 32 GM/DL — SIGNIFICANT CHANGE UP (ref 32–36)
MCV RBC AUTO: 95.4 FL — SIGNIFICANT CHANGE UP (ref 80–100)
NRBC # BLD: 0 /100 WBCS — SIGNIFICANT CHANGE UP (ref 0–0)
PHOSPHATE SERPL-MCNC: 2.5 MG/DL — SIGNIFICANT CHANGE UP (ref 2.5–4.5)
PLATELET # BLD AUTO: 328 K/UL — SIGNIFICANT CHANGE UP (ref 150–400)
POTASSIUM SERPL-MCNC: 3.7 MMOL/L — SIGNIFICANT CHANGE UP (ref 3.5–5.3)
POTASSIUM SERPL-SCNC: 3.7 MMOL/L — SIGNIFICANT CHANGE UP (ref 3.5–5.3)
RBC # BLD: 3.73 M/UL — LOW (ref 3.8–5.2)
RBC # FLD: 14 % — SIGNIFICANT CHANGE UP (ref 10.3–14.5)
SODIUM SERPL-SCNC: 137 MMOL/L — SIGNIFICANT CHANGE UP (ref 135–145)
WBC # BLD: 15.78 K/UL — HIGH (ref 3.8–10.5)
WBC # FLD AUTO: 15.78 K/UL — HIGH (ref 3.8–10.5)

## 2021-07-03 RX ORDER — KETOROLAC TROMETHAMINE 30 MG/ML
30 SYRINGE (ML) INJECTION EVERY 6 HOURS
Refills: 0 | Status: DISCONTINUED | OUTPATIENT
Start: 2021-07-03 | End: 2021-07-04

## 2021-07-03 RX ORDER — HYDROMORPHONE HYDROCHLORIDE 2 MG/ML
1 INJECTION INTRAMUSCULAR; INTRAVENOUS; SUBCUTANEOUS EVERY 4 HOURS
Refills: 0 | Status: DISCONTINUED | OUTPATIENT
Start: 2021-07-03 | End: 2021-07-04

## 2021-07-03 RX ORDER — POTASSIUM CHLORIDE 20 MEQ
20 PACKET (EA) ORAL ONCE
Refills: 0 | Status: DISCONTINUED | OUTPATIENT
Start: 2021-07-03 | End: 2021-07-03

## 2021-07-03 RX ORDER — ACETAMINOPHEN 500 MG
1000 TABLET ORAL ONCE
Refills: 0 | Status: COMPLETED | OUTPATIENT
Start: 2021-07-03 | End: 2021-07-03

## 2021-07-03 RX ORDER — ACETAMINOPHEN 500 MG
975 TABLET ORAL EVERY 6 HOURS
Refills: 0 | Status: DISCONTINUED | OUTPATIENT
Start: 2021-07-03 | End: 2021-07-03

## 2021-07-03 RX ORDER — SODIUM,POTASSIUM PHOSPHATES 278-250MG
1 POWDER IN PACKET (EA) ORAL ONCE
Refills: 0 | Status: COMPLETED | OUTPATIENT
Start: 2021-07-03 | End: 2021-07-03

## 2021-07-03 RX ADMIN — PANTOPRAZOLE SODIUM 40 MILLIGRAM(S): 20 TABLET, DELAYED RELEASE ORAL at 22:50

## 2021-07-03 RX ADMIN — Medication 400 MILLIGRAM(S): at 03:00

## 2021-07-03 RX ADMIN — Medication 30 MILLIGRAM(S): at 09:00

## 2021-07-03 RX ADMIN — OXYCODONE HYDROCHLORIDE 10 MILLIGRAM(S): 5 TABLET ORAL at 15:00

## 2021-07-03 RX ADMIN — Medication 30 MILLIGRAM(S): at 21:20

## 2021-07-03 RX ADMIN — Medication 81 MILLIGRAM(S): at 12:09

## 2021-07-03 RX ADMIN — GABAPENTIN 600 MILLIGRAM(S): 400 CAPSULE ORAL at 22:21

## 2021-07-03 RX ADMIN — Medication 30 MILLIGRAM(S): at 15:00

## 2021-07-03 RX ADMIN — FLUTICASONE PROPIONATE AND SALMETEROL 1 DOSE(S): 50; 250 POWDER ORAL; RESPIRATORY (INHALATION) at 22:55

## 2021-07-03 RX ADMIN — OXYCODONE HYDROCHLORIDE 10 MILLIGRAM(S): 5 TABLET ORAL at 10:05

## 2021-07-03 RX ADMIN — OXYCODONE HYDROCHLORIDE 10 MILLIGRAM(S): 5 TABLET ORAL at 14:25

## 2021-07-03 RX ADMIN — Medication 30 MILLIGRAM(S): at 08:02

## 2021-07-03 RX ADMIN — OXYCODONE HYDROCHLORIDE 10 MILLIGRAM(S): 5 TABLET ORAL at 18:27

## 2021-07-03 RX ADMIN — Medication 1 TABLET(S): at 16:28

## 2021-07-03 RX ADMIN — Medication 30 MILLIGRAM(S): at 20:48

## 2021-07-03 RX ADMIN — OXYCODONE HYDROCHLORIDE 10 MILLIGRAM(S): 5 TABLET ORAL at 22:28

## 2021-07-03 RX ADMIN — OXYCODONE HYDROCHLORIDE 10 MILLIGRAM(S): 5 TABLET ORAL at 19:00

## 2021-07-03 RX ADMIN — Medication 3 MILLIGRAM(S): at 22:22

## 2021-07-03 RX ADMIN — OXYCODONE HYDROCHLORIDE 10 MILLIGRAM(S): 5 TABLET ORAL at 06:06

## 2021-07-03 RX ADMIN — OXYCODONE HYDROCHLORIDE 10 MILLIGRAM(S): 5 TABLET ORAL at 02:20

## 2021-07-03 RX ADMIN — VALACYCLOVIR 1000 MILLIGRAM(S): 500 TABLET, FILM COATED ORAL at 22:55

## 2021-07-03 RX ADMIN — Medication 400 MILLIGRAM(S): at 16:01

## 2021-07-03 RX ADMIN — HYDROMORPHONE HYDROCHLORIDE 0.5 MILLIGRAM(S): 2 INJECTION INTRAMUSCULAR; INTRAVENOUS; SUBCUTANEOUS at 00:31

## 2021-07-03 RX ADMIN — OXYCODONE HYDROCHLORIDE 10 MILLIGRAM(S): 5 TABLET ORAL at 02:50

## 2021-07-03 RX ADMIN — Medication 1000 MILLIGRAM(S): at 10:00

## 2021-07-03 RX ADMIN — CITALOPRAM 20 MILLIGRAM(S): 10 TABLET, FILM COATED ORAL at 22:21

## 2021-07-03 RX ADMIN — Medication 1000 MILLIGRAM(S): at 16:27

## 2021-07-03 RX ADMIN — Medication 1000 MILLIGRAM(S): at 03:30

## 2021-07-03 RX ADMIN — LORATADINE 10 MILLIGRAM(S): 10 TABLET ORAL at 22:22

## 2021-07-03 RX ADMIN — Medication 30 MILLIGRAM(S): at 02:50

## 2021-07-03 RX ADMIN — OXYCODONE HYDROCHLORIDE 10 MILLIGRAM(S): 5 TABLET ORAL at 23:20

## 2021-07-03 RX ADMIN — Medication 30 MILLIGRAM(S): at 14:26

## 2021-07-03 RX ADMIN — OXYCODONE HYDROCHLORIDE 10 MILLIGRAM(S): 5 TABLET ORAL at 06:36

## 2021-07-03 RX ADMIN — Medication 400 MILLIGRAM(S): at 09:30

## 2021-07-03 RX ADMIN — FLUTICASONE PROPIONATE AND SALMETEROL 1 DOSE(S): 50; 250 POWDER ORAL; RESPIRATORY (INHALATION) at 05:27

## 2021-07-03 RX ADMIN — Medication 30 MILLIGRAM(S): at 02:20

## 2021-07-03 RX ADMIN — HYDROMORPHONE HYDROCHLORIDE 0.5 MILLIGRAM(S): 2 INJECTION INTRAMUSCULAR; INTRAVENOUS; SUBCUTANEOUS at 00:01

## 2021-07-03 RX ADMIN — OXYCODONE HYDROCHLORIDE 10 MILLIGRAM(S): 5 TABLET ORAL at 10:30

## 2021-07-03 RX ADMIN — Medication 400 MILLIGRAM(S): at 22:25

## 2021-07-03 RX ADMIN — POLYETHYLENE GLYCOL 3350 17 GRAM(S): 17 POWDER, FOR SOLUTION ORAL at 12:09

## 2021-07-03 RX ADMIN — ENOXAPARIN SODIUM 40 MILLIGRAM(S): 100 INJECTION SUBCUTANEOUS at 05:30

## 2021-07-03 RX ADMIN — SENNA PLUS 2 TABLET(S): 8.6 TABLET ORAL at 22:21

## 2021-07-03 RX ADMIN — Medication 1000 MILLIGRAM(S): at 23:28

## 2021-07-03 NOTE — PROGRESS NOTE ADULT - ASSESSMENT
Assessment:  51yFemale patient POD#2 S/P robotic median arcuate ligament release c/b celiac artery bleeding converted to an ex lap with repair of celiac artery for median arcuate ligament syndrome    Plan:  - Pain control PRN  - Diet: regular   - IVL   - on home meds  - Activity: as tolerated   - DVT ppx: LVX   - FU labs, replete prn    [facility]  [phone].

## 2021-07-03 NOTE — PROGRESS NOTE ADULT - SUBJECTIVE AND OBJECTIVE BOX
VASCULAR SURGERY PROGRESS NOTE      Post-Op Day #2  Procedure/Dx: Laparoscopy, with median arcuate ligament release    SUBJECTIVE  Pt seen and examined at bedside. Pt is eating well on regular diet, ambulating around room without assistance, belching and passing flatus, but no BM since op.    Overnight: no acute events      PMHx  Arthritis    H/O abdominal pain    Asthma    Graves disease    Cystic fibrosis carrier    Seasonal allergies    Frequent sinus infections    History of 2019 novel coronavirus disease (COVID-19)    H/O neuropathy    H/O carpal tunnel syndrome    H/O abnormal weight loss    CHRIS (obstructive sleep apnea)          OBJECTIVE:    PHYSICAL EXAM   General Appearance: Appears well, NAD  Neck: Supple  Chest: Equal expansion bilaterally, equal breath sounds  CV: Pulse regular presently  Abdomen: Soft, nontense, appropriate incisional tenderness, dressings clean and dry and intact    Vital Signs Last 24 Hrs  T(C): 36.5 (03 Jul 2021 05:25), Max: 37.2 (02 Jul 2021 11:00)  T(F): 97.7 (03 Jul 2021 05:25), Max: 99 (02 Jul 2021 11:00)  HR: 77 (03 Jul 2021 05:25) (73 - 102)  BP: 117/75 (03 Jul 2021 05:25) (68/44 - 150/73)  BP(mean): 95 (02 Jul 2021 11:00) (85 - 95)  RR: 16 (03 Jul 2021 05:25) (12 - 35)  SpO2: 95% (03 Jul 2021 05:25) (92% - 98%)    I's & O's    07-02-21 @ 07:01  -  07-03-21 @ 07:00  --------------------------------------------------------  IN: 2960 mL / OUT: 1740 mL / NET: 1220 mL        LAB/STUDIES:                        11.4   15.78 )-----------( 328      ( 03 Jul 2021 07:26 )             35.6     07-03    137  |  101  |  7   ----------------------------<  106<H>  3.7   |  20<L>  |  0.85    Ca    8.7      03 Jul 2021 07:26  Phos  2.5     07-03  Mg     2.0     07-03      PT/INR - ( 01 Jul 2021 21:56 )   PT: 13.1 sec;   INR: 1.10 ratio    PTT - ( 01 Jul 2021 21:56 )  PTT:26.3 sec    ABG - ( 02 Jul 2021 02:11 )  pH, Arterial: 7.41  pH, Blood: x     /  pCO2: 33    /  pO2: 78    / HCO3: 21    / Base Excess: -2.5  /  SaO2: 96

## 2021-07-03 NOTE — PROGRESS NOTE ADULT - SUBJECTIVE AND OBJECTIVE BOX
STATUS POST:  robotic median arcuate ligament release converted to ex-lap for repair of celiac arteriotomy    POST OPERATIVE DAY #: 2    SUBJECTIVE:   Seen and examined at bedside. Pain controlled with IV Tylenol, IV Toradol, and oxycodone. no acute events overnight. tolerating a diet. passing flatus    OBJECTIVE: T(C): 36.7 (07-03-21 @ 01:14), Max: 37.4 (07-02-21 @ 07:00)  HR: 81 (07-03-21 @ 01:14) (71 - 102)  BP: 99/61 (07-03-21 @ 01:14) (68/44 - 162/71)  RR: 18 (07-03-21 @ 01:14) (12 - 35)  SpO2: 95% (07-03-21 @ 01:14) (92% - 98%)  Wt(kg): --  I&O's Summary    01 Jul 2021 07:01  -  02 Jul 2021 07:00  --------------------------------------------------------  IN: 2350 mL / OUT: 905 mL / NET: 1445 mL    02 Jul 2021 07:01  -  03 Jul 2021 02:35  --------------------------------------------------------  IN: 2620 mL / OUT: 1740 mL / NET: 880 mL      I&O's Detail    01 Jul 2021 07:01  -  02 Jul 2021 07:00  --------------------------------------------------------  IN:    IV PiggyBack: 100 mL    Lactated Ringers: 1250 mL    Lactated Ringers Bolus: 1000 mL  Total IN: 2350 mL    OUT:    Voided (mL): 905 mL  Total OUT: 905 mL    Total NET: 1445 mL      02 Jul 2021 07:01  -  03 Jul 2021 02:35  --------------------------------------------------------  IN:    IV PiggyBack: 200 mL    Lactated Ringers: 500 mL    Oral Fluid: 920 mL    Sodium Chloride 0.9% Bolus: 1000 mL  Total IN: 2620 mL    OUT:    Indwelling Catheter - Urethral (mL): 440 mL    Voided (mL): 1300 mL  Total OUT: 1740 mL    Total NET: 880 mL        Physical Examination:  General: NAD, resting comfortably in bed  HEENT: Normocephalic atraumatic  Respiratory: Nonlabored respirations, normal CW expansion.  Abdomen: soft, nontender, nondistended, surgical incisions are c/d/i.   Vascular: extremities are warm and well perfused.    MEDICATIONS  (STANDING):  acetaminophen  IVPB .. 1000 milliGRAM(s) IV Intermittent every 6 hours  aspirin enteric coated 81 milliGRAM(s) Oral daily  chlorhexidine 2% Cloths 1 Application(s) Topical <User Schedule>  citalopram 20 milliGRAM(s) Oral at bedtime  enoxaparin Injectable 40 milliGRAM(s) SubCutaneous every 24 hours  fluticasone propionate/ salmeterol 250-50 MICROgram(s) Diskus 1 Dose(s) Inhalation two times a day  gabapentin 600 milliGRAM(s) Oral at bedtime  ketorolac   Injectable 30 milliGRAM(s) IV Push every 6 hours  loratadine 10 milliGRAM(s) Oral at bedtime  melatonin 3 milliGRAM(s) Oral at bedtime  oxyCODONE    IR 10 milliGRAM(s) Oral every 4 hours  pantoprazole    Tablet 40 milliGRAM(s) Oral before breakfast  polyethylene glycol 3350 17 Gram(s) Oral daily  senna 2 Tablet(s) Oral at bedtime  valACYclovir 1000 milliGRAM(s) Oral daily    MEDICATIONS  (PRN):  albuterol/ipratropium for Nebulization 3 milliLiter(s) Nebulizer every 6 hours PRN Shortness of Breath and/or Wheezing  HYDROmorphone  Injectable 0.5 milliGRAM(s) IV Push every 3 hours PRN Severe Breakthrough Pain  oxyCODONE    IR 5 milliGRAM(s) Oral every 4 hours PRN Moderate Pain (4 - 6)      LABS:                        11.4   14.93 )-----------( 300      ( 02 Jul 2021 15:39 )             35.2     07-02    133<L>  |  100  |  9   ----------------------------<  172<H>  3.8   |  19<L>  |  0.80    Ca    8.7      02 Jul 2021 15:39  Phos  2.6     07-02  Mg     1.7     07-02      PT/INR - ( 01 Jul 2021 21:56 )   PT: 13.1 sec;   INR: 1.10 ratio         PTT - ( 01 Jul 2021 21:56 )  PTT:26.3 sec

## 2021-07-03 NOTE — PROGRESS NOTE ADULT - ASSESSMENT
ASSESSMENT & PLAN:   51y F presenting with generalized abdominal pain now s/p robotic median arcuate ligament release and ex-lap for repair of celiac arteriotomy, POD #2. Patient is doing well.      Plan:  - Diet: Regular  - Pain control PRN  - Continue ASA 81mg  - DVT ppx: LVX  - OOB and ambulating as tolerated  - KELSEY Robertson, PGY-1  Vascular Surgery  Pager [phone]

## 2021-07-04 LAB
ANION GAP SERPL CALC-SCNC: 13 MMOL/L — SIGNIFICANT CHANGE UP (ref 5–17)
BUN SERPL-MCNC: 5 MG/DL — LOW (ref 7–23)
CALCIUM SERPL-MCNC: 8.5 MG/DL — SIGNIFICANT CHANGE UP (ref 8.4–10.5)
CHLORIDE SERPL-SCNC: 101 MMOL/L — SIGNIFICANT CHANGE UP (ref 96–108)
CO2 SERPL-SCNC: 22 MMOL/L — SIGNIFICANT CHANGE UP (ref 22–31)
CREAT SERPL-MCNC: 0.75 MG/DL — SIGNIFICANT CHANGE UP (ref 0.5–1.3)
GLUCOSE SERPL-MCNC: 103 MG/DL — HIGH (ref 70–99)
HCT VFR BLD CALC: 32.5 % — LOW (ref 34.5–45)
HGB BLD-MCNC: 10.1 G/DL — LOW (ref 11.5–15.5)
MAGNESIUM SERPL-MCNC: 1.9 MG/DL — SIGNIFICANT CHANGE UP (ref 1.6–2.6)
MCHC RBC-ENTMCNC: 29.8 PG — SIGNIFICANT CHANGE UP (ref 27–34)
MCHC RBC-ENTMCNC: 31.1 GM/DL — LOW (ref 32–36)
MCV RBC AUTO: 95.9 FL — SIGNIFICANT CHANGE UP (ref 80–100)
NRBC # BLD: 0 /100 WBCS — SIGNIFICANT CHANGE UP (ref 0–0)
PHOSPHATE SERPL-MCNC: 2.6 MG/DL — SIGNIFICANT CHANGE UP (ref 2.5–4.5)
PLATELET # BLD AUTO: 315 K/UL — SIGNIFICANT CHANGE UP (ref 150–400)
POTASSIUM SERPL-MCNC: 3.6 MMOL/L — SIGNIFICANT CHANGE UP (ref 3.5–5.3)
POTASSIUM SERPL-SCNC: 3.6 MMOL/L — SIGNIFICANT CHANGE UP (ref 3.5–5.3)
RBC # BLD: 3.39 M/UL — LOW (ref 3.8–5.2)
RBC # FLD: 14 % — SIGNIFICANT CHANGE UP (ref 10.3–14.5)
SODIUM SERPL-SCNC: 136 MMOL/L — SIGNIFICANT CHANGE UP (ref 135–145)
WBC # BLD: 11.73 K/UL — HIGH (ref 3.8–10.5)
WBC # FLD AUTO: 11.73 K/UL — HIGH (ref 3.8–10.5)

## 2021-07-04 RX ORDER — TRAMADOL HYDROCHLORIDE 50 MG/1
50 TABLET ORAL EVERY 6 HOURS
Refills: 0 | Status: DISCONTINUED | OUTPATIENT
Start: 2021-07-04 | End: 2021-07-04

## 2021-07-04 RX ORDER — LANOLIN ALCOHOL/MO/W.PET/CERES
5 CREAM (GRAM) TOPICAL AT BEDTIME
Refills: 0 | Status: DISCONTINUED | OUTPATIENT
Start: 2021-07-04 | End: 2021-07-05

## 2021-07-04 RX ORDER — OXYCODONE HYDROCHLORIDE 5 MG/1
10 TABLET ORAL EVERY 4 HOURS
Refills: 0 | Status: DISCONTINUED | OUTPATIENT
Start: 2021-07-04 | End: 2021-07-05

## 2021-07-04 RX ORDER — OXYCODONE HYDROCHLORIDE 5 MG/1
5 TABLET ORAL EVERY 4 HOURS
Refills: 0 | Status: DISCONTINUED | OUTPATIENT
Start: 2021-07-04 | End: 2021-07-05

## 2021-07-04 RX ORDER — ACETAMINOPHEN 500 MG
1000 TABLET ORAL EVERY 6 HOURS
Refills: 0 | Status: DISCONTINUED | OUTPATIENT
Start: 2021-07-04 | End: 2021-07-04

## 2021-07-04 RX ORDER — OXYCODONE HYDROCHLORIDE 5 MG/1
10 TABLET ORAL EVERY 4 HOURS
Refills: 0 | Status: DISCONTINUED | OUTPATIENT
Start: 2021-07-04 | End: 2021-07-04

## 2021-07-04 RX ORDER — ACETAMINOPHEN 500 MG
1000 TABLET ORAL ONCE
Refills: 0 | Status: COMPLETED | OUTPATIENT
Start: 2021-07-04 | End: 2021-07-04

## 2021-07-04 RX ORDER — TRAMADOL HYDROCHLORIDE 50 MG/1
25 TABLET ORAL EVERY 6 HOURS
Refills: 0 | Status: DISCONTINUED | OUTPATIENT
Start: 2021-07-04 | End: 2021-07-04

## 2021-07-04 RX ORDER — ACETAMINOPHEN 500 MG
975 TABLET ORAL EVERY 6 HOURS
Refills: 0 | Status: DISCONTINUED | OUTPATIENT
Start: 2021-07-04 | End: 2021-07-04

## 2021-07-04 RX ORDER — MAGNESIUM SULFATE 500 MG/ML
1 VIAL (ML) INJECTION ONCE
Refills: 0 | Status: COMPLETED | OUTPATIENT
Start: 2021-07-04 | End: 2021-07-04

## 2021-07-04 RX ORDER — ACETAMINOPHEN 500 MG
1000 TABLET ORAL EVERY 6 HOURS
Refills: 0 | Status: DISCONTINUED | OUTPATIENT
Start: 2021-07-04 | End: 2021-07-05

## 2021-07-04 RX ORDER — OXYCODONE HYDROCHLORIDE 5 MG/1
5 TABLET ORAL EVERY 4 HOURS
Refills: 0 | Status: DISCONTINUED | OUTPATIENT
Start: 2021-07-04 | End: 2021-07-04

## 2021-07-04 RX ORDER — PANTOPRAZOLE SODIUM 20 MG/1
40 TABLET, DELAYED RELEASE ORAL
Refills: 0 | Status: DISCONTINUED | OUTPATIENT
Start: 2021-07-04 | End: 2021-07-05

## 2021-07-04 RX ORDER — POTASSIUM CHLORIDE 20 MEQ
40 PACKET (EA) ORAL ONCE
Refills: 0 | Status: COMPLETED | OUTPATIENT
Start: 2021-07-04 | End: 2021-07-04

## 2021-07-04 RX ADMIN — OXYCODONE HYDROCHLORIDE 10 MILLIGRAM(S): 5 TABLET ORAL at 22:00

## 2021-07-04 RX ADMIN — OXYCODONE HYDROCHLORIDE 10 MILLIGRAM(S): 5 TABLET ORAL at 06:25

## 2021-07-04 RX ADMIN — POLYETHYLENE GLYCOL 3350 17 GRAM(S): 17 POWDER, FOR SOLUTION ORAL at 11:51

## 2021-07-04 RX ADMIN — GABAPENTIN 600 MILLIGRAM(S): 400 CAPSULE ORAL at 21:18

## 2021-07-04 RX ADMIN — PANTOPRAZOLE SODIUM 40 MILLIGRAM(S): 20 TABLET, DELAYED RELEASE ORAL at 21:18

## 2021-07-04 RX ADMIN — Medication 40 MILLIEQUIVALENT(S): at 16:01

## 2021-07-04 RX ADMIN — Medication 30 MILLIGRAM(S): at 14:23

## 2021-07-04 RX ADMIN — ENOXAPARIN SODIUM 40 MILLIGRAM(S): 100 INJECTION SUBCUTANEOUS at 05:02

## 2021-07-04 RX ADMIN — Medication 30 MILLIGRAM(S): at 03:45

## 2021-07-04 RX ADMIN — Medication 100 GRAM(S): at 16:03

## 2021-07-04 RX ADMIN — Medication 400 MILLIGRAM(S): at 05:01

## 2021-07-04 RX ADMIN — Medication 1000 MILLIGRAM(S): at 05:30

## 2021-07-04 RX ADMIN — CITALOPRAM 20 MILLIGRAM(S): 10 TABLET, FILM COATED ORAL at 21:19

## 2021-07-04 RX ADMIN — Medication 30 MILLIGRAM(S): at 09:30

## 2021-07-04 RX ADMIN — OXYCODONE HYDROCHLORIDE 10 MILLIGRAM(S): 5 TABLET ORAL at 21:18

## 2021-07-04 RX ADMIN — OXYCODONE HYDROCHLORIDE 10 MILLIGRAM(S): 5 TABLET ORAL at 02:45

## 2021-07-04 RX ADMIN — Medication 1000 MILLIGRAM(S): at 17:25

## 2021-07-04 RX ADMIN — Medication 30 MILLIGRAM(S): at 08:43

## 2021-07-04 RX ADMIN — OXYCODONE HYDROCHLORIDE 10 MILLIGRAM(S): 5 TABLET ORAL at 11:30

## 2021-07-04 RX ADMIN — FLUTICASONE PROPIONATE AND SALMETEROL 1 DOSE(S): 50; 250 POWDER ORAL; RESPIRATORY (INHALATION) at 06:09

## 2021-07-04 RX ADMIN — Medication 81 MILLIGRAM(S): at 11:51

## 2021-07-04 RX ADMIN — Medication 30 MILLIGRAM(S): at 02:45

## 2021-07-04 RX ADMIN — Medication 1000 MILLIGRAM(S): at 18:25

## 2021-07-04 RX ADMIN — HYDROMORPHONE HYDROCHLORIDE 1 MILLIGRAM(S): 2 INJECTION INTRAMUSCULAR; INTRAVENOUS; SUBCUTANEOUS at 01:25

## 2021-07-04 RX ADMIN — OXYCODONE HYDROCHLORIDE 10 MILLIGRAM(S): 5 TABLET ORAL at 15:58

## 2021-07-04 RX ADMIN — OXYCODONE HYDROCHLORIDE 10 MILLIGRAM(S): 5 TABLET ORAL at 16:24

## 2021-07-04 RX ADMIN — Medication 5 MILLIGRAM(S): at 21:18

## 2021-07-04 RX ADMIN — LORATADINE 10 MILLIGRAM(S): 10 TABLET ORAL at 21:18

## 2021-07-04 RX ADMIN — OXYCODONE HYDROCHLORIDE 10 MILLIGRAM(S): 5 TABLET ORAL at 03:45

## 2021-07-04 RX ADMIN — Medication 30 MILLIGRAM(S): at 15:00

## 2021-07-04 RX ADMIN — VALACYCLOVIR 1000 MILLIGRAM(S): 500 TABLET, FILM COATED ORAL at 22:46

## 2021-07-04 RX ADMIN — HYDROMORPHONE HYDROCHLORIDE 1 MILLIGRAM(S): 2 INJECTION INTRAMUSCULAR; INTRAVENOUS; SUBCUTANEOUS at 02:30

## 2021-07-04 RX ADMIN — FLUTICASONE PROPIONATE AND SALMETEROL 1 DOSE(S): 50; 250 POWDER ORAL; RESPIRATORY (INHALATION) at 16:25

## 2021-07-04 RX ADMIN — OXYCODONE HYDROCHLORIDE 10 MILLIGRAM(S): 5 TABLET ORAL at 11:04

## 2021-07-04 RX ADMIN — Medication 62.5 MILLIMOLE(S): at 16:02

## 2021-07-04 NOTE — PROGRESS NOTE ADULT - ASSESSMENT
Assessment:  51yFemale patient POD#3 S/P robotic median arcuate ligament release c/b celiac artery bleeding converted to an ex lap with repair of celiac artery for median arcuate ligament syndrome    Plan:  - Pain control PRN  - Diet: regular   - IVL   - on home meds  - Activity: as tolerated   - DVT ppx: LVX   - FU labs, replete prn   Assessment:  51yFemale patient POD#3 S/P robotic median arcuate ligament release c/b celiac artery bleeding converted to an ex lap with repair of celiac artery for median arcuate ligament syndrome    Plan:  - Pain control PRN (IV tylenol, toradol, oxy)  - Diet: regular   - on home meds  - Activity: as tolerated   - DVT ppx: LVX   - FU labs, replete prn      [facility]  [id number]

## 2021-07-04 NOTE — PROGRESS NOTE ADULT - SUBJECTIVE AND OBJECTIVE BOX
Surgery Progress Note    STATUS POST:  robotic median arcuate ligament release converted to ex-lap for repair of celiac arteriotomy    POST OPERATIVE DAY #: 3    SUBJECTIVE:   Seen and examined at bedside. Pain controlled with IV Tylenol, IV Toradol, and oxycodone. no acute events overnight. tolerating a diet. passing flatus      Vital Signs Last 24 Hrs  T(C): 37.2 (04 Jul 2021 01:52), Max: 37.2 (04 Jul 2021 01:52)  T(F): 98.9 (04 Jul 2021 01:52), Max: 98.9 (04 Jul 2021 01:52)  HR: 85 (04 Jul 2021 01:52) (75 - 85)  BP: 107/65 (04 Jul 2021 01:52) (103/69 - 117/75)  BP(mean): --  RR: 18 (04 Jul 2021 01:52) (16 - 18)  SpO2: 93% (04 Jul 2021 01:52) (93% - 98%)    Physical Exam:  General: NAD, resting comfortably in bed  HEENT: Normocephalic atraumatic  Respiratory: Nonlabored respirations, normal CW expansion.  Abdomen: soft, nontender, nondistended, surgical incisions are c/d/i.   Vascular: extremities are warm and well perfused.    LABS:                        11.4   15.78 )-----------( 328      ( 03 Jul 2021 07:26 )             35.6     07-03    137  |  101  |  7   ----------------------------<  106<H>  3.7   |  20<L>  |  0.85    Ca    8.7      03 Jul 2021 07:26  Phos  2.5     07-03  Mg     2.0     07-03            INs and OUTs:    07-02-21 @ 07:01  -  07-03-21 @ 07:00  --------------------------------------------------------  IN: 2960 mL / OUT: 1740 mL / NET: 1220 mL    07-03-21 @ 07:01 - 07-04-21 @ 02:23  --------------------------------------------------------  IN: 700 mL / OUT: 1000 mL / NET: -300 mL     Surgery Progress Note    STATUS POST:  robotic median arcuate ligament release converted to ex-lap for repair of celiac arteriotomy    POST OPERATIVE DAY #: 3    SUBJECTIVE:   Seen and examined at bedside. Pain controlled with IV Tylenol, Toradol, and oxycodone. states she just has incisional soreness. no acute events overnight. tolerating regular diet. passing flatus. no BM. no n/v. ambulating. WBC uptrending yesterday. However, WBC down to 11 from 15 this AM.      Vital Signs Last 24 Hrs  T(C): 37.2 (04 Jul 2021 01:52), Max: 37.2 (04 Jul 2021 01:52)  T(F): 98.9 (04 Jul 2021 01:52), Max: 98.9 (04 Jul 2021 01:52)  HR: 85 (04 Jul 2021 01:52) (75 - 85)  BP: 107/65 (04 Jul 2021 01:52) (103/69 - 117/75)  BP(mean): --  RR: 18 (04 Jul 2021 01:52) (16 - 18)  SpO2: 93% (04 Jul 2021 01:52) (93% - 98%)    Physical Exam:  General: NAD, resting comfortably in bed  HEENT: Normocephalic atraumatic  Respiratory: Nonlabored respirations, normal CW expansion.  Abdomen: soft, nontender, nondistended, surgical incisions are c/d/i.   Vascular: extremities are warm and well perfused.    LABS:                        11.4   15.78 )-----------( 328      ( 03 Jul 2021 07:26 )             35.6     07-03    137  |  101  |  7   ----------------------------<  106<H>  3.7   |  20<L>  |  0.85    Ca    8.7      03 Jul 2021 07:26  Phos  2.5     07-03  Mg     2.0     07-03            INs and OUTs:    07-02-21 @ 07:01  -  07-03-21 @ 07:00  --------------------------------------------------------  IN: 2960 mL / OUT: 1740 mL / NET: 1220 mL    07-03-21 @ 07:01  -  07-04-21 @ 02:23  --------------------------------------------------------  IN: 700 mL / OUT: 1000 mL / NET: -300 mL

## 2021-07-05 ENCOUNTER — TRANSCRIPTION ENCOUNTER (OUTPATIENT)
Age: 51
End: 2021-07-05

## 2021-07-05 VITALS
DIASTOLIC BLOOD PRESSURE: 74 MMHG | TEMPERATURE: 99 F | HEART RATE: 84 BPM | SYSTOLIC BLOOD PRESSURE: 124 MMHG | OXYGEN SATURATION: 98 % | RESPIRATION RATE: 18 BRPM

## 2021-07-05 LAB
ANION GAP SERPL CALC-SCNC: 11 MMOL/L — SIGNIFICANT CHANGE UP (ref 5–17)
BUN SERPL-MCNC: 6 MG/DL — LOW (ref 7–23)
CALCIUM SERPL-MCNC: 8.7 MG/DL — SIGNIFICANT CHANGE UP (ref 8.4–10.5)
CHLORIDE SERPL-SCNC: 104 MMOL/L — SIGNIFICANT CHANGE UP (ref 96–108)
CO2 SERPL-SCNC: 23 MMOL/L — SIGNIFICANT CHANGE UP (ref 22–31)
CREAT SERPL-MCNC: 0.78 MG/DL — SIGNIFICANT CHANGE UP (ref 0.5–1.3)
GLUCOSE SERPL-MCNC: 120 MG/DL — HIGH (ref 70–99)
HCT VFR BLD CALC: 32.5 % — LOW (ref 34.5–45)
HGB BLD-MCNC: 10.2 G/DL — LOW (ref 11.5–15.5)
MAGNESIUM SERPL-MCNC: 2.3 MG/DL — SIGNIFICANT CHANGE UP (ref 1.6–2.6)
MCHC RBC-ENTMCNC: 30.4 PG — SIGNIFICANT CHANGE UP (ref 27–34)
MCHC RBC-ENTMCNC: 31.4 GM/DL — LOW (ref 32–36)
MCV RBC AUTO: 97 FL — SIGNIFICANT CHANGE UP (ref 80–100)
NRBC # BLD: 0 /100 WBCS — SIGNIFICANT CHANGE UP (ref 0–0)
PHOSPHATE SERPL-MCNC: 2.9 MG/DL — SIGNIFICANT CHANGE UP (ref 2.5–4.5)
PLATELET # BLD AUTO: 313 K/UL — SIGNIFICANT CHANGE UP (ref 150–400)
POTASSIUM SERPL-MCNC: 4.2 MMOL/L — SIGNIFICANT CHANGE UP (ref 3.5–5.3)
POTASSIUM SERPL-SCNC: 4.2 MMOL/L — SIGNIFICANT CHANGE UP (ref 3.5–5.3)
RBC # BLD: 3.35 M/UL — LOW (ref 3.8–5.2)
RBC # FLD: 14 % — SIGNIFICANT CHANGE UP (ref 10.3–14.5)
SODIUM SERPL-SCNC: 138 MMOL/L — SIGNIFICANT CHANGE UP (ref 135–145)
WBC # BLD: 9.19 K/UL — SIGNIFICANT CHANGE UP (ref 3.8–10.5)
WBC # FLD AUTO: 9.19 K/UL — SIGNIFICANT CHANGE UP (ref 3.8–10.5)

## 2021-07-05 PROCEDURE — 81025 URINE PREGNANCY TEST: CPT

## 2021-07-05 PROCEDURE — S2900: CPT

## 2021-07-05 PROCEDURE — C1769: CPT

## 2021-07-05 PROCEDURE — 97530 THERAPEUTIC ACTIVITIES: CPT

## 2021-07-05 PROCEDURE — 84132 ASSAY OF SERUM POTASSIUM: CPT

## 2021-07-05 PROCEDURE — C9399: CPT

## 2021-07-05 PROCEDURE — 80048 BASIC METABOLIC PNL TOTAL CA: CPT

## 2021-07-05 PROCEDURE — 85730 THROMBOPLASTIN TIME PARTIAL: CPT

## 2021-07-05 PROCEDURE — 97129 THER IVNTJ 1ST 15 MIN: CPT

## 2021-07-05 PROCEDURE — 82435 ASSAY OF BLOOD CHLORIDE: CPT

## 2021-07-05 PROCEDURE — 82962 GLUCOSE BLOOD TEST: CPT

## 2021-07-05 PROCEDURE — 85610 PROTHROMBIN TIME: CPT

## 2021-07-05 PROCEDURE — 82803 BLOOD GASES ANY COMBINATION: CPT

## 2021-07-05 PROCEDURE — 86850 RBC ANTIBODY SCREEN: CPT

## 2021-07-05 PROCEDURE — 84295 ASSAY OF SERUM SODIUM: CPT

## 2021-07-05 PROCEDURE — 85014 HEMATOCRIT: CPT

## 2021-07-05 PROCEDURE — 86769 SARS-COV-2 COVID-19 ANTIBODY: CPT

## 2021-07-05 PROCEDURE — 86900 BLOOD TYPING SEROLOGIC ABO: CPT

## 2021-07-05 PROCEDURE — 82330 ASSAY OF CALCIUM: CPT

## 2021-07-05 PROCEDURE — 94640 AIRWAY INHALATION TREATMENT: CPT

## 2021-07-05 PROCEDURE — 84100 ASSAY OF PHOSPHORUS: CPT

## 2021-07-05 PROCEDURE — 83605 ASSAY OF LACTIC ACID: CPT

## 2021-07-05 PROCEDURE — 74018 RADEX ABDOMEN 1 VIEW: CPT

## 2021-07-05 PROCEDURE — 85018 HEMOGLOBIN: CPT

## 2021-07-05 PROCEDURE — 82565 ASSAY OF CREATININE: CPT

## 2021-07-05 PROCEDURE — P9016: CPT

## 2021-07-05 PROCEDURE — 82947 ASSAY GLUCOSE BLOOD QUANT: CPT

## 2021-07-05 PROCEDURE — 93005 ELECTROCARDIOGRAM TRACING: CPT

## 2021-07-05 PROCEDURE — 86901 BLOOD TYPING SEROLOGIC RH(D): CPT

## 2021-07-05 PROCEDURE — 97161 PT EVAL LOW COMPLEX 20 MIN: CPT

## 2021-07-05 PROCEDURE — 86923 COMPATIBILITY TEST ELECTRIC: CPT

## 2021-07-05 PROCEDURE — 85027 COMPLETE CBC AUTOMATED: CPT

## 2021-07-05 PROCEDURE — 83735 ASSAY OF MAGNESIUM: CPT

## 2021-07-05 PROCEDURE — C1889: CPT

## 2021-07-05 PROCEDURE — 97165 OT EVAL LOW COMPLEX 30 MIN: CPT

## 2021-07-05 RX ORDER — GABAPENTIN 400 MG/1
200 CAPSULE ORAL THREE TIMES A DAY
Refills: 0 | Status: DISCONTINUED | OUTPATIENT
Start: 2021-07-05 | End: 2021-07-05

## 2021-07-05 RX ORDER — IBUPROFEN 200 MG
400 TABLET ORAL EVERY 6 HOURS
Refills: 0 | Status: DISCONTINUED | OUTPATIENT
Start: 2021-07-05 | End: 2021-07-05

## 2021-07-05 RX ORDER — ASPIRIN/CALCIUM CARB/MAGNESIUM 324 MG
1 TABLET ORAL
Qty: 0 | Refills: 0 | DISCHARGE
Start: 2021-07-05

## 2021-07-05 RX ORDER — GABAPENTIN 400 MG/1
600 CAPSULE ORAL THREE TIMES A DAY
Refills: 0 | Status: DISCONTINUED | OUTPATIENT
Start: 2021-07-05 | End: 2021-07-05

## 2021-07-05 RX ORDER — GABAPENTIN 400 MG/1
1 CAPSULE ORAL
Qty: 0 | Refills: 0 | DISCHARGE
Start: 2021-07-05

## 2021-07-05 RX ORDER — GABAPENTIN 400 MG/1
600 CAPSULE ORAL
Qty: 0 | Refills: 0 | DISCHARGE
Start: 2021-07-05

## 2021-07-05 RX ORDER — PANTOPRAZOLE SODIUM 20 MG/1
40 TABLET, DELAYED RELEASE ORAL
Refills: 0 | Status: DISCONTINUED | OUTPATIENT
Start: 2021-07-05 | End: 2021-07-05

## 2021-07-05 RX ORDER — IBUPROFEN 200 MG
1 TABLET ORAL
Qty: 0 | Refills: 0 | DISCHARGE
Start: 2021-07-05

## 2021-07-05 RX ORDER — ONDANSETRON 8 MG/1
4 TABLET, FILM COATED ORAL ONCE
Refills: 0 | Status: COMPLETED | OUTPATIENT
Start: 2021-07-05 | End: 2021-07-05

## 2021-07-05 RX ORDER — ACETAMINOPHEN 500 MG
2 TABLET ORAL
Qty: 0 | Refills: 0 | DISCHARGE
Start: 2021-07-05

## 2021-07-05 RX ORDER — OXYCODONE HYDROCHLORIDE 5 MG/1
42 TABLET ORAL
Qty: 1764 | Refills: 0
Start: 2021-07-05 | End: 2021-07-11

## 2021-07-05 RX ORDER — ACETAMINOPHEN 500 MG
1000 TABLET ORAL EVERY 6 HOURS
Refills: 0 | Status: DISCONTINUED | OUTPATIENT
Start: 2021-07-05 | End: 2021-07-05

## 2021-07-05 RX ORDER — OXYCODONE HYDROCHLORIDE 5 MG/1
1 TABLET ORAL
Qty: 30 | Refills: 0
Start: 2021-07-05

## 2021-07-05 RX ADMIN — OXYCODONE HYDROCHLORIDE 10 MILLIGRAM(S): 5 TABLET ORAL at 09:02

## 2021-07-05 RX ADMIN — ONDANSETRON 4 MILLIGRAM(S): 8 TABLET, FILM COATED ORAL at 01:25

## 2021-07-05 RX ADMIN — OXYCODONE HYDROCHLORIDE 10 MILLIGRAM(S): 5 TABLET ORAL at 00:59

## 2021-07-05 RX ADMIN — Medication 62.5 MILLIMOLE(S): at 11:04

## 2021-07-05 RX ADMIN — Medication 81 MILLIGRAM(S): at 12:51

## 2021-07-05 RX ADMIN — POLYETHYLENE GLYCOL 3350 17 GRAM(S): 17 POWDER, FOR SOLUTION ORAL at 12:52

## 2021-07-05 RX ADMIN — GABAPENTIN 600 MILLIGRAM(S): 400 CAPSULE ORAL at 14:25

## 2021-07-05 RX ADMIN — Medication 400 MILLIGRAM(S): at 11:02

## 2021-07-05 RX ADMIN — Medication 1000 MILLIGRAM(S): at 12:53

## 2021-07-05 RX ADMIN — Medication 1000 MILLIGRAM(S): at 06:20

## 2021-07-05 RX ADMIN — PANTOPRAZOLE SODIUM 40 MILLIGRAM(S): 20 TABLET, DELAYED RELEASE ORAL at 05:35

## 2021-07-05 RX ADMIN — OXYCODONE HYDROCHLORIDE 10 MILLIGRAM(S): 5 TABLET ORAL at 01:00

## 2021-07-05 RX ADMIN — PANTOPRAZOLE SODIUM 40 MILLIGRAM(S): 20 TABLET, DELAYED RELEASE ORAL at 12:52

## 2021-07-05 RX ADMIN — Medication 1000 MILLIGRAM(S): at 02:00

## 2021-07-05 RX ADMIN — ENOXAPARIN SODIUM 40 MILLIGRAM(S): 100 INJECTION SUBCUTANEOUS at 05:35

## 2021-07-05 RX ADMIN — Medication 1000 MILLIGRAM(S): at 00:59

## 2021-07-05 NOTE — DISCHARGE NOTE PROVIDER - NSDCMRMEDTOKEN_GEN_ALL_CORE_FT
Advair Diskus 250 mcg-50 mcg inhalation powder: 1 puff(s) inhaled once a day  albuterol 90 mcg/inh inhalation aerosol: 2 puff(s) inhaled every 6 hours, As Needed  aspirin 81 mg oral delayed release tablet: 1 tab(s) orally once a day  citalopram 20 mg oral tablet: 1 tab(s) orally once a day  gabapentin 600 mg oral tablet: 1 tab(s) orally once a day (at bedtime)  ibuprofen 400 mg oral tablet: 1 tab(s) orally every 6 hours  Junel Fe 1/20 oral tablet: 1 tab(s) orally once a day  loratadine 10 mg oral tablet: 1 tab(s) orally once a day  Melatonin 3 mg oral tablet: orally once a day (at bedtime)  omeprazole 20 mg oral delayed release tablet: 1 tab(s) orally once a day (at bedtime)  Tylenol Extra Strength 500 mg oral tablet: 2 tab(s) orally every 6 hours  valACYclovir 1 g oral tablet: 1 tab(s) orally once a day   Advair Diskus 250 mcg-50 mcg inhalation powder: 1 puff(s) inhaled once a day  albuterol 90 mcg/inh inhalation aerosol: 2 puff(s) inhaled every 6 hours, As Needed  aspirin 81 mg oral delayed release tablet: 1 tab(s) orally once a day  citalopram 20 mg oral tablet: 1 tab(s) orally once a day  ibuprofen 400 mg oral tablet: 1 tab(s) orally every 6 hours  Junel Fe 1/20 oral tablet: 1 tab(s) orally once a day  loratadine 10 mg oral tablet: 1 tab(s) orally once a day  Melatonin 3 mg oral tablet: orally once a day (at bedtime)  Neurontin 600 mg oral tablet: 1 tab(s) orally 3 times a day  omeprazole 20 mg oral delayed release tablet: 1 tab(s) orally once a day (at bedtime)  oxyCODONE 10 mg oral tablet: 1 tab(s) orally 4 times a day, As Needed -for moderate pain MDD:6 tabs  Tylenol Extra Strength 500 mg oral tablet: 2 tab(s) orally every 6 hours  valACYclovir 1 g oral tablet: 1 tab(s) orally once a day   Advair Diskus 250 mcg-50 mcg inhalation powder: 1 puff(s) inhaled once a day  albuterol 90 mcg/inh inhalation aerosol: 2 puff(s) inhaled every 6 hours, As Needed  aspirin 81 mg oral delayed release tablet: 1 tab(s) orally once a day  citalopram 20 mg oral tablet: 1 tab(s) orally once a day  gabapentin: 600 milligram(s) orally 3 times a day  ibuprofen 400 mg oral tablet: 1 tab(s) orally every 6 hours  Junel Fe 1/20 oral tablet: 1 tab(s) orally once a day  loratadine 10 mg oral tablet: 1 tab(s) orally once a day  Melatonin 3 mg oral tablet: orally once a day (at bedtime)  omeprazole 20 mg oral delayed release tablet: 1 tab(s) orally once a day (at bedtime)  oxyCODONE 10 mg oral tablet: 1 tab(s) orally 4 times a day, As Needed -for moderate pain MDD:6 tabs  Tylenol Extra Strength 500 mg oral tablet: 2 tab(s) orally every 6 hours  valACYclovir 1 g oral tablet: 1 tab(s) orally once a day

## 2021-07-05 NOTE — DISCHARGE NOTE PROVIDER - NSDCCPTREATMENT_GEN_ALL_CORE_FT
PRINCIPAL PROCEDURE  Procedure: Laparoscopic release of median arcuate ligament  Findings and Treatment:       SECONDARY PROCEDURE  Procedure: Exploratory laparotomy  Findings and Treatment: with control of bleeding from celiac

## 2021-07-05 NOTE — DISCHARGE NOTE NURSING/CASE MANAGEMENT/SOCIAL WORK - PATIENT PORTAL LINK FT
You can access the FollowMyHealth Patient Portal offered by [facility] by registering at the following website: [email or web address]. By joining [email or web address]’s FollowMyHealth portal, you will also be able to view your health information using other applications (apps) compatible with our system.

## 2021-07-05 NOTE — PROGRESS NOTE ADULT - ASSESSMENT
ASSESSMENT & PLAN:   51y F presenting with generalized abdominal pain now s/p robotic median arcuate ligament release and ex-lap for repair of celiac arteriotomy, POD #4. Patient is doing well.      Plan:  - Diet: Regular  - Pain control PRN  - Continue ASA 81mg  - DVT ppx: LVX  - OOB and ambulating as tolerated  - KELSEY Robertson, PGY-1  Vascular Surgery  Pager [phone]    ASSESSMENT & PLAN:   51y F presenting with generalized abdominal pain now s/p robotic median arcuate ligament release and ex-lap for repair of celiac arteriotomy, POD #4. Patient is doing well.    Plan:  - Diet: Regular  - Pain control PRN  - Continue ASA 81mg  - DVT ppx: LVX  - OOB and ambulating as tolerated  - IVL  - Dispo planning per General Surgery team       Vascular Surgery  Pager [phone]

## 2021-07-05 NOTE — DISCHARGE NOTE PROVIDER - CARE PROVIDER_API CALL
Cheo Blancas  General Surgery  [address] [place] [address], [place] [address]  Phone: [phone]  Fax: [phone]  Established Patient  Follow Up Time: 2 weeks

## 2021-07-05 NOTE — PROGRESS NOTE ADULT - REASON FOR ADMISSION
generalized abdominal pain
generalized abdominal pain
s/p 7/1 laparoscopy of median arcuate ligament release c/b bleeding from celiac artery requiring ex lap primary celiac arterial repair

## 2021-07-05 NOTE — PROGRESS NOTE ADULT - SUBJECTIVE AND OBJECTIVE BOX
STATUS POST:  robotic median arcuate ligament release converted to ex-lap for repair of celiac arteriotomy    POST OPERATIVE DAY #: 4    SUBJECTIVE:   Seen and examined at bedside.  no acute events overnight. tolerating regular diet. passing flatus. no BM.     OBJECTIVE: T(C): 37.1 (07-05-21 @ 01:20), Max: 37.1 (07-05-21 @ 01:20)  HR: 87 (07-05-21 @ 01:20) (72 - 94)  BP: 119/78 (07-05-21 @ 01:20) (105/69 - 146/79)  RR: 18 (07-05-21 @ 01:20) (18 - 18)  SpO2: 98% (07-05-21 @ 01:20) (96% - 99%)  Wt(kg): --  I&O's Summary    03 Jul 2021 07:01  -  04 Jul 2021 07:00  --------------------------------------------------------  IN: 900 mL / OUT: 1000 mL / NET: -100 mL    04 Jul 2021 07:01  -  05 Jul 2021 03:02  --------------------------------------------------------  IN: 800 mL / OUT: 1000 mL / NET: -200 mL      I&O's Detail    03 Jul 2021 07:01  -  04 Jul 2021 07:00  --------------------------------------------------------  IN:    IV PiggyBack: 400 mL    Oral Fluid: 500 mL  Total IN: 900 mL    OUT:    Voided (mL): 1000 mL  Total OUT: 1000 mL    Total NET: -100 mL      04 Jul 2021 07:01  -  05 Jul 2021 03:02  --------------------------------------------------------  IN:    IV PiggyBack: 100 mL    Oral Fluid: 700 mL  Total IN: 800 mL    OUT:    Voided (mL): 1000 mL  Total OUT: 1000 mL    Total NET: -200 mL        Physical Exam:  General: NAD, resting comfortably in bed  HEENT: Normocephalic atraumatic  Respiratory: Nonlabored respirations, normal CW expansion.  Abdomen: soft, nontender, nondistended, surgical incisions are c/d/i.   Vascular: extremities are warm and well perfused.    MEDICATIONS  (STANDING):  acetaminophen   Tablet .. 1000 milliGRAM(s) Oral every 6 hours  aspirin enteric coated 81 milliGRAM(s) Oral daily  chlorhexidine 2% Cloths 1 Application(s) Topical <User Schedule>  citalopram 20 milliGRAM(s) Oral at bedtime  enoxaparin Injectable 40 milliGRAM(s) SubCutaneous every 24 hours  fluticasone propionate/ salmeterol 250-50 MICROgram(s) Diskus 1 Dose(s) Inhalation two times a day  gabapentin 600 milliGRAM(s) Oral at bedtime  loratadine 10 milliGRAM(s) Oral at bedtime  melatonin 5 milliGRAM(s) Oral at bedtime  pantoprazole    Tablet 40 milliGRAM(s) Oral two times a day  polyethylene glycol 3350 17 Gram(s) Oral daily  senna 2 Tablet(s) Oral at bedtime  valACYclovir 1000 milliGRAM(s) Oral daily    MEDICATIONS  (PRN):  albuterol/ipratropium for Nebulization 3 milliLiter(s) Nebulizer every 6 hours PRN Shortness of Breath and/or Wheezing  oxyCODONE    IR 5 milliGRAM(s) Oral every 4 hours PRN Moderate Pain (4 - 6)  oxyCODONE    IR 10 milliGRAM(s) Oral every 4 hours PRN Severe Pain (7 - 10)      LABS:                        10.1   11.73 )-----------( 315      ( 04 Jul 2021 07:18 )             32.5     07-04    136  |  101  |  5<L>  ----------------------------<  103<H>  3.6   |  22  |  0.75    Ca    8.5      04 Jul 2021 07:17  Phos  2.6     07-04  Mg     1.9     07-04

## 2021-07-05 NOTE — PROGRESS NOTE ADULT - ATTENDING COMMENTS
Surgery Attending    Pt seen and examined; agree with above assessment and plan    Ready for discharge once pain regimen adequately adjusted
I have seen and examined the patient.  I agree with the surgical resident's note. still with pain that is treated with IV Tylenol and some Oxy - need to trend WBC which is slowly rising - some burping after meals but no vomiting    [place] contact information [phone]
POD1 robotic median arcuate ligament release converted to ex-lap for repair of celiac arteriotomy    Doing well, c/o incisional soreness, otherwise no complaints    Vitals stable  Abdomen soft, nondistended, appropriately tender at upper midline incision site, incisions clean & dry  moves all ext  urine clear via brewer    Hct stable, no evidence of bleeding    Plan  - d/c brewer  - reg diet as tolerated  - pain control  - trend labs    Discussed with patient, intraoperative events reviewed, all questions answered    Cheo Blancas MD
Patient seen/examined.  Agree w above note and plan and have discussed plan w house staff.  Incisional pain, tolerating diet.  H/H stable, home when pain controlled    El Varghese MD

## 2021-07-05 NOTE — DISCHARGE NOTE PROVIDER - NSDCACTIVITY_GEN_ALL_CORE
Showering allowed/Driving allowed/Walking - Indoors allowed/No heavy lifting/straining/Walking - Outdoors allowed

## 2021-07-05 NOTE — PROGRESS NOTE ADULT - ASSESSMENT
Assessment:  51yFemale patient POD#4 S/P robotic median arcuate ligament release c/b celiac artery bleeding converted to an ex lap with repair of celiac artery for median arcuate ligament syndrome    Plan:  - Trend WBC  - Pain control PRN (IV tylenol, toradol, oxy)  - Diet: regular   - on home meds  - Activity: as tolerated   - DVT ppx: LVX   - FU labs, replete prn      [facility]  [id number]   Assessment:  51yFemale patient POD#4 S/P robotic median arcuate ligament release c/b celiac artery bleeding converted to an ex lap with repair of celiac artery for median arcuate ligament syndrome    Plan:  - Pain control (tylenol, ibuprofen, oxy, gabapentin)       - started scheduled ibuprofen today, will stagger with scheduled tylenol       - will increase gabapentin to TID dosing  - Diet: regular  - PPI: protonix 40 mg PO qD  - on home meds  - Activity: as tolerated   - DVT ppx: LVX  - FU labs, replete prn  - Dispo planning:   - Remain on ASA upon discharge in setting of arterial injury/repair  - Follow up in clinic with Dr. Blancas 1-2 weeks after discharge      [facility]  [id number]

## 2021-07-05 NOTE — PROGRESS NOTE ADULT - SUBJECTIVE AND OBJECTIVE BOX
VASCULAR SURGERY PROGRESS NOTE      Post-Op Day #4  Procedure/Dx: Laparoscopy, with median arcuate ligament release    SUBJECTIVE  Pt seen and examined at bedside. Pt is eating well on regular diet, ambulating around room without assistance, belching and passing flatus, but no BM since op.    Overnight: no acute events            OBJECTIVE:    PHYSICAL EXAM   General Appearance: Appears well, NAD  Neck: Supple  Chest: Equal expansion bilaterally, equal breath sounds  CV: Pulse regular presently  Abdomen: Soft, nontense, appropriate incisional tenderness, dressings clean and dry and intact    Vital Signs Last 24 Hrs  T(C): 37 (04 Jul 2021 21:15), Max: 37.2 (04 Jul 2021 01:52)  T(F): 98.6 (04 Jul 2021 21:15), Max: 98.9 (04 Jul 2021 01:52)  HR: 79 (04 Jul 2021 21:15) (72 - 94)  BP: 116/74 (04 Jul 2021 21:15) (105/69 - 146/79)  BP(mean): --  RR: 18 (04 Jul 2021 21:15) (18 - 18)  SpO2: 99% (04 Jul 2021 21:15) (93% - 99%)      03 Jul 2021 07:01  -  04 Jul 2021 07:00  --------------------------------------------------------  IN:    IV PiggyBack: 400 mL    Oral Fluid: 500 mL  Total IN: 900 mL    OUT:    Voided (mL): 1000 mL  Total OUT: 1000 mL    Total NET: -100 mL      04 Jul 2021 07:01  -  05 Jul 2021 00:20  --------------------------------------------------------  IN:    IV PiggyBack: 100 mL    Oral Fluid: 700 mL  Total IN: 800 mL    OUT:    Voided (mL): 1000 mL  Total OUT: 1000 mL    Total NET: -200 mL        LABS:  cret                        10.1   11.73 )-----------( 315      ( 04 Jul 2021 07:18 )             32.5     07-04    136  |  101  |  5<L>  ----------------------------<  103<H>  3.6   |  22  |  0.75    Ca    8.5      04 Jul 2021 07:17  Phos  2.6     07-04  Mg     1.9     07-04               VASCULAR SURGERY PROGRESS NOTE      Post-Op Day #4  Procedure/Dx: Laparoscopy, with median arcuate ligament release    SUBJECTIVE  Pt seen and examined at bedside. Pt is eating well on regular diet, ambulating around room without assistance, belching and passing flatus, but no BM since op.    Overnight: no acute events        OBJECTIVE:    PHYSICAL EXAM   General Appearance: Appears well, NAD  Neck: Supple  Chest: Equal expansion bilaterally, equal breath sounds  CV: Pulse regular presently  Abdomen: Soft, nontense, appropriate incisional tenderness, dressings clean and dry and intact    Vital Signs Last 24 Hrs  T(C): 37 (04 Jul 2021 21:15), Max: 37.2 (04 Jul 2021 01:52)  T(F): 98.6 (04 Jul 2021 21:15), Max: 98.9 (04 Jul 2021 01:52)  HR: 79 (04 Jul 2021 21:15) (72 - 94)  BP: 116/74 (04 Jul 2021 21:15) (105/69 - 146/79)  BP(mean): --  RR: 18 (04 Jul 2021 21:15) (18 - 18)  SpO2: 99% (04 Jul 2021 21:15) (93% - 99%)      03 Jul 2021 07:01  -  04 Jul 2021 07:00  --------------------------------------------------------  IN:    IV PiggyBack: 400 mL    Oral Fluid: 500 mL  Total IN: 900 mL    OUT:    Voided (mL): 1000 mL  Total OUT: 1000 mL    Total NET: -100 mL      04 Jul 2021 07:01  -  05 Jul 2021 00:20  --------------------------------------------------------  IN:    IV PiggyBack: 100 mL    Oral Fluid: 700 mL  Total IN: 800 mL    OUT:    Voided (mL): 1000 mL  Total OUT: 1000 mL    Total NET: -200 mL        LABS:  cret                        10.1   11.73 )-----------( 315      ( 04 Jul 2021 07:18 )             32.5     07-04    136  |  101  |  5<L>  ----------------------------<  103<H>  3.6   |  22  |  0.75    Ca    8.5      04 Jul 2021 07:17  Phos  2.6     07-04  Mg     1.9     07-04

## 2021-07-05 NOTE — DISCHARGE NOTE PROVIDER - HOSPITAL COURSE
49 y/o female with hx of postprandial abdominal pain with solid food (able to tolerate liquids).  PMHx includes Graves disease s/p Methamizone, now Euthyroid, asthma, CHRIS (uses dental apparatus), arthritis and neuropathy to lower extremities  She was admitted from 5/10/21 to 5/14/21 with worsening abdominal pain and inability to tolerate solid food.  Since that admission she was tolerating liquids, but was still unable to tolerate solids.  She reported 40lb weight loss over the past 2 months.  She complained of bloating, flatus and soft bowel movements.  She denied any nausea, vomiting, diarrhea, constipation.  She presented for robotic assisted laparoscopic median arcuate ligament release on 7/1/21. Her procedure was complicated by bleeding from the celiac artery which required an exploratory laparotomy. Vascular surgery was consulted intraoperatively to assist with control of bleeding from the celiac artery. Post-operatively the patient went to the ICU for 1 day before being transferred to the floor. Her post-operative course was uncomplicated. Prior to discharge she tolerated a regular diet, was able to ambulate, and had her pain controlled. She is being discharged home. She will follow up in clinic with Dr. Blancas in 1-2 weeks.

## 2021-07-05 NOTE — DISCHARGE NOTE PROVIDER - NSDCCPCAREPLAN_GEN_ALL_CORE_FT
PRINCIPAL DISCHARGE DIAGNOSIS  Diagnosis: Median arcuate ligament syndrome  Assessment and Plan of Treatment: Surgical procedure (median arcuate ligament release) was performed      SECONDARY DISCHARGE DIAGNOSES  Diagnosis: Injury of celiac artery  Assessment and Plan of Treatment: Was repaired via laparotomy and open repair of the artery

## 2021-07-11 ENCOUNTER — EMERGENCY (EMERGENCY)
Facility: HOSPITAL | Age: 51
LOS: 1 days | Discharge: HOME | End: 2021-07-11
Attending: EMERGENCY MEDICINE
Payer: COMMERCIAL

## 2021-07-11 VITALS
RESPIRATION RATE: 18 BRPM | HEART RATE: 82 BPM | TEMPERATURE: 99 F | SYSTOLIC BLOOD PRESSURE: 115 MMHG | DIASTOLIC BLOOD PRESSURE: 77 MMHG | HEIGHT: 62 IN | OXYGEN SATURATION: 99 % | WEIGHT: 192.9 LBS

## 2021-07-11 VITALS
RESPIRATION RATE: 16 BRPM | SYSTOLIC BLOOD PRESSURE: 112 MMHG | OXYGEN SATURATION: 98 % | HEART RATE: 72 BPM | TEMPERATURE: 98 F | DIASTOLIC BLOOD PRESSURE: 72 MMHG

## 2021-07-11 DIAGNOSIS — Z98.890 OTHER SPECIFIED POSTPROCEDURAL STATES: Chronic | ICD-10-CM

## 2021-07-11 DIAGNOSIS — Z98.891 HISTORY OF UTERINE SCAR FROM PREVIOUS SURGERY: Chronic | ICD-10-CM

## 2021-07-11 LAB
ALBUMIN SERPL ELPH-MCNC: 3.9 G/DL — SIGNIFICANT CHANGE UP (ref 3.3–5)
ALP SERPL-CCNC: 92 U/L — SIGNIFICANT CHANGE UP (ref 40–120)
ALT FLD-CCNC: 27 U/L — SIGNIFICANT CHANGE UP (ref 10–45)
ANION GAP SERPL CALC-SCNC: 15 MMOL/L — SIGNIFICANT CHANGE UP (ref 5–17)
AST SERPL-CCNC: 22 U/L — SIGNIFICANT CHANGE UP (ref 10–40)
BASOPHILS # BLD AUTO: 0.06 K/UL — SIGNIFICANT CHANGE UP (ref 0–0.2)
BASOPHILS NFR BLD AUTO: 0.6 % — SIGNIFICANT CHANGE UP (ref 0–2)
BILIRUB SERPL-MCNC: 0.3 MG/DL — SIGNIFICANT CHANGE UP (ref 0.2–1.2)
BUN SERPL-MCNC: 14 MG/DL — SIGNIFICANT CHANGE UP (ref 7–23)
CALCIUM SERPL-MCNC: 9.5 MG/DL — SIGNIFICANT CHANGE UP (ref 8.4–10.5)
CHLORIDE SERPL-SCNC: 103 MMOL/L — SIGNIFICANT CHANGE UP (ref 96–108)
CO2 SERPL-SCNC: 22 MMOL/L — SIGNIFICANT CHANGE UP (ref 22–31)
CREAT SERPL-MCNC: 0.72 MG/DL — SIGNIFICANT CHANGE UP (ref 0.5–1.3)
EOSINOPHIL # BLD AUTO: 0.44 K/UL — SIGNIFICANT CHANGE UP (ref 0–0.5)
EOSINOPHIL NFR BLD AUTO: 4.2 % — SIGNIFICANT CHANGE UP (ref 0–6)
GLUCOSE SERPL-MCNC: 120 MG/DL — HIGH (ref 70–99)
HCT VFR BLD CALC: 34.2 % — LOW (ref 34.5–45)
HGB BLD-MCNC: 11 G/DL — LOW (ref 11.5–15.5)
IMM GRANULOCYTES NFR BLD AUTO: 0.6 % — SIGNIFICANT CHANGE UP (ref 0–1.5)
LYMPHOCYTES # BLD AUTO: 19.7 % — SIGNIFICANT CHANGE UP (ref 13–44)
LYMPHOCYTES # BLD AUTO: 2.07 K/UL — SIGNIFICANT CHANGE UP (ref 1–3.3)
MCHC RBC-ENTMCNC: 30.6 PG — SIGNIFICANT CHANGE UP (ref 27–34)
MCHC RBC-ENTMCNC: 32.2 GM/DL — SIGNIFICANT CHANGE UP (ref 32–36)
MCV RBC AUTO: 95 FL — SIGNIFICANT CHANGE UP (ref 80–100)
MONOCYTES # BLD AUTO: 0.8 K/UL — SIGNIFICANT CHANGE UP (ref 0–0.9)
MONOCYTES NFR BLD AUTO: 7.6 % — SIGNIFICANT CHANGE UP (ref 2–14)
NEUTROPHILS # BLD AUTO: 7.06 K/UL — SIGNIFICANT CHANGE UP (ref 1.8–7.4)
NEUTROPHILS NFR BLD AUTO: 67.3 % — SIGNIFICANT CHANGE UP (ref 43–77)
NRBC # BLD: 0 /100 WBCS — SIGNIFICANT CHANGE UP (ref 0–0)
PLATELET # BLD AUTO: 590 K/UL — HIGH (ref 150–400)
POTASSIUM SERPL-MCNC: 3.7 MMOL/L — SIGNIFICANT CHANGE UP (ref 3.5–5.3)
POTASSIUM SERPL-SCNC: 3.7 MMOL/L — SIGNIFICANT CHANGE UP (ref 3.5–5.3)
PROT SERPL-MCNC: 7.1 G/DL — SIGNIFICANT CHANGE UP (ref 6–8.3)
RBC # BLD: 3.6 M/UL — LOW (ref 3.8–5.2)
RBC # FLD: 14 % — SIGNIFICANT CHANGE UP (ref 10.3–14.5)
SODIUM SERPL-SCNC: 140 MMOL/L — SIGNIFICANT CHANGE UP (ref 135–145)
WBC # BLD: 10.49 K/UL — SIGNIFICANT CHANGE UP (ref 3.8–10.5)
WBC # FLD AUTO: 10.49 K/UL — SIGNIFICANT CHANGE UP (ref 3.8–10.5)

## 2021-07-11 PROCEDURE — 74177 CT ABD & PELVIS W/CONTRAST: CPT | Mod: 26,MA

## 2021-07-11 PROCEDURE — 99284 EMERGENCY DEPT VISIT MOD MDM: CPT | Mod: 25

## 2021-07-11 PROCEDURE — 85025 COMPLETE CBC W/AUTO DIFF WBC: CPT

## 2021-07-11 PROCEDURE — 80053 COMPREHEN METABOLIC PANEL: CPT

## 2021-07-11 PROCEDURE — 74177 CT ABD & PELVIS W/CONTRAST: CPT

## 2021-07-11 PROCEDURE — 99284 EMERGENCY DEPT VISIT MOD MDM: CPT

## 2021-07-11 RX ORDER — ACETAMINOPHEN 500 MG
975 TABLET ORAL ONCE
Refills: 0 | Status: COMPLETED | OUTPATIENT
Start: 2021-07-11 | End: 2021-07-11

## 2021-07-11 RX ORDER — IBUPROFEN 200 MG
400 TABLET ORAL ONCE
Refills: 0 | Status: COMPLETED | OUTPATIENT
Start: 2021-07-11 | End: 2021-07-11

## 2021-07-11 RX ORDER — ACETAMINOPHEN 500 MG
1000 TABLET ORAL ONCE
Refills: 0 | Status: DISCONTINUED | OUTPATIENT
Start: 2021-07-11 | End: 2021-07-11

## 2021-07-11 RX ADMIN — Medication 400 MILLIGRAM(S): at 08:51

## 2021-07-11 RX ADMIN — Medication 975 MILLIGRAM(S): at 05:43

## 2021-07-11 NOTE — ED ADULT NURSE NOTE - CINV DISCH MEDS REVIEWED YN
no meds, but as per MD ulrich, if prescription for abx needed patient will receive a call from surgery md/Ginna

## 2021-07-11 NOTE — ED PROVIDER NOTE - NSFOLLOWUPINSTRUCTIONS_ED_ALL_ED_FT
You were seen in the Emergency Department for pain and swelling underneath your surgical site. You were seen by the surgery team that works with Dr. Blancas, they reviewed your CT and bloodwork and determined the fluid collection was minor and they drained it ad bedside. Please follow up with Dr. Blancas for further management.     1) Advance activity as tolerated.   2) Continue all previously prescribed medications as directed.    3) Follow up with your primary care physician in 24-48 hours - take copies of your results.    4) Return to the Emergency Department for worsening or persistent symptoms, and/or ANY NEW OR CONCERNING SYMPTOMS, including but not limited to:    Worsening pain, not controlled by home pain medication, fevers, chills, chest pain, shortness of breath, leg pain or swelling, abdominal pain, nausea, vomiting, inability to take any food or drink by mouth.

## 2021-07-11 NOTE — CONSULT NOTE ADULT - SUBJECTIVE AND OBJECTIVE BOX
HPI:    Patient reports drainage began overnight and started out as frankly bloody, now thick, serosanguineous fluid. Denies any other symptoms at this time. Tolerating diet, post-surgical pain well-controlled on OTC medications, rarely requiring oxycodone anymore. 49 y/o female w/ a PMHx of Graves disease, asthma, CHRIS (uses dental apparatus), arthritis, LE neuropathy, depression, and herpes simplex w/ persistent oral sores who presented on  for a scheduled robotic-assisted laparoscopic median arcuate ligament release for persistent nausea, inability to tolerate solid foods, and abdominal pain. Case was complicated by an injury to the base of the celiac axis requiring conversion to an open laparotomy and primary repair w/ 4-0 Prolene x2 by cardiothoracic & vascular surgery. Patient was extubated at the end of the case and admitted to SICU post-operatively for one hemodynamic monitoring. Discharged without further complication on POD3 and  Denies headache, dizziness, weakness, fevers, chills, shortness of breath, chest pain, nausea/vomiting, or diarrhea.        PAST MEDICAL & SURGICAL HISTORY:  Arthritis    H/O abdominal pain    Asthma  well controlled, denies hospitalizations/intubations    Graves disease  tx&#x27;d with Methamizole- completed several years ago, now Euthyroid    Cystic fibrosis carrier    Seasonal allergies    Frequent sinus infections    History of 2019 novel coronavirus disease (COVID-19)  2021- did not require hospitalization/oxygen    H/O neuropathy  controlled on Gabapentin    H/O carpal tunnel syndrome    H/O abnormal weight loss  over past 2 months lost 40lbs since she cannot tolerate solid food    CHRIS (obstructive sleep apnea)  rx&#x27;d dental apparatus    H/O:  section  x 2    H/O endoscopy  May 2021        MEDICATIONS  (STANDING):    MEDICATIONS  (PRN):      Allergies    amoxicillin (Hives)  budesonide (Flushing; Rash)  sulfa drugs (Hives)    Intolerances        SOCIAL HISTORY:    FAMILY HISTORY:  FH: kidney cancer (Sibling)            Physical Exam:  General: NAD, resting comfortably  HEENT: NC/AT, EOMI, normal hearing, no oral lesions, no LAD, neck supple  Pulmonary: normal resp effort, CTA-B  Cardiovascular: NSR, no murmurs  Abdominal: soft, ND/NT, no organomegaly  Extremities: WWP, normal strength, no clubbing/cyanosis/edema  Neuro: A/O x 3, CNs II-XII grossly intact, normal sensation, no focal deficits  Pulses: palpable distal pulses    Vital Signs Last 24 Hrs  T(C): 36.6 (2021 11:11), Max: 37 (2021 03:34)  T(F): 97.9 (2021 11:11), Max: 98.6 (2021 03:34)  HR: 72 (2021 11:11) (72 - 82)  BP: 112/72 (2021 11:11) (112/72 - 135/83)  BP(mean): 85 (2021 11:11) (85 - 99)  RR: 16 (2021 11:11) (16 - 18)  SpO2: 98% (2021 11:11) (98% - 99%)    I&O's Summary          LABS:                        11.0   10.49 )-----------( 590      ( 2021 05:45 )             34.2     07-11    140  |  103  |  14  ----------------------------<  120<H>  3.7   |  22  |  0.72    Ca    9.5      2021 05:45    TPro  7.1  /  Alb  3.9  /  TBili  0.3  /  DBili  x   /  AST  22  /  ALT  27  /  AlkPhos  92  07-11        CAPILLARY BLOOD GLUCOSE        LIVER FUNCTIONS - ( 2021 05:45 )  Alb: 3.9 g/dL / Pro: 7.1 g/dL / ALK PHOS: 92 U/L / ALT: 27 U/L / AST: 22 U/L / GGT: x             Cultures:      RADIOLOGY & ADDITIONAL STUDIES:  < from: CT Abdomen and Pelvis w/ IV Cont (21 @ 09:53) >  FINDINGS:  LOWER CHEST: Within normal limits.    LIVER: Normal morphology. Subcentimeter bilobar cysts, unchanged. Previously described right lobe lesion is not well appreciated on current venous phase study and was better appreciated on arterial phase imaging of prior CT and MR.  BILE DUCTS: Normal caliber.  GALLBLADDER: Within normal limits.  SPLEEN: Within normal limits.  PANCREAS: Within normal limits.  ADRENALS: Within normal limits.  KIDNEYS/URETERS: Within normal limits.    BLADDER: Underdistended.  REPRODUCTIVE ORGANS: Normal uterus. No solid adnexal masses.    BOWEL: No bowel obstruction.  PERITONEUM: No ascites.  VESSELS: Status post median arcuate ligament release with inflammation at the level of the celiac axis distorting the vessel, difficult to evaluate given lack of arterial phase imaging, favoring postsurgical change. The common hepatic and splenic artery branches are patent.  RETROPERITONEUM/LYMPH NODES: No lymphadenopathy.  ABDOMINAL WALL: Postsurgical change with midline incision and overlying skin staples with an ill-defined subcutaneous fluid collection at the incision site measuring 2.6 x 2.3 x 6.8 cm (602, 11).  BONES: Degenerative changes.    IMPRESSION:  A 2.6 x 2.3 x 6.8 cm early collection in the subcutaneous tissues at the midline abdominal wall incisional site.    Celiac axis is distorted by surrounding inflammatory change, likely postsurgical appearance/change. Evaluation for vessel patency is limited given lack of arterial phase imaging and vessel thrombus cannot be entirely excluded. Distally, the common hepatic artery and the splenic artery are patent. If there is clinical concern, arterial phase imaging maybe obtained for more definitive characterization.    < end of copied text >

## 2021-07-11 NOTE — ED ADULT NURSE REASSESSMENT NOTE - NS ED NURSE REASSESS COMMENT FT1
as per MD ulrich, no antibiotics for surgical incision infection necessary at this time. pt instructed that she will receive a followup phone call from surgery team if  abx necessary

## 2021-07-11 NOTE — ED PROVIDER NOTE - PHYSICAL EXAMINATION
CONSTITUTIONAL: Awake, alert and oriented in no acute distress.   EYES: no conjunctival injection.   CARD: S1, S2 normal; no murmurs, gallops, or rubs. Regular rate and rhythm.   RESP: No wheezes, rales or rhonchi. Good air movement bilaterally.   ABD: Abdomen is relatively firm 2 inches on each side behind the inferior portion of the abdominal incision, 1 inch of erythema, warmth, firmer skin surrounding inferior half of incision. tiny amount of serous leakage from wound. staples in place, wound scabbing and healing well, clean without pus. Otherwise soft nontender, no guarding, no distention, no rigidity.   EXT: Ambulates independently.  No cyanosis or edema.   PSYCH: Cooperative, appropriate.

## 2021-07-11 NOTE — ED PROVIDER NOTE - PROGRESS NOTE DETAILS
Ron OLIVAS: Signout taken from Dr. Bowser, resident, f/u surgery reccs. Ron OLIVAS: surgery drained and packed abd wall seroma t bedside. will d/c for outpt f/u with Dr. Blancas.

## 2021-07-11 NOTE — CONSULT NOTE ADULT - ASSESSMENT
Patient discussed with attending, Dr. Swain.     Venessa Guerra MD  [facility]  [id number]   51 year old woman presented 7/1 for a scheduled robotic-assisted laparoscopic median arcuate ligament release with celiac artery repair, presents with drainage from mid-inferior portion of abdominal incision    2 staples removed and wound probed in all 4 directions, tracks several centimeters inferiorly  approximately 30 cc's of fat necrosis and liquified hematoma expressed and wound packed   no clinical evidence of infection and patient tolerated procedure well  Patient to follow up and see Dr. Blancas tomorrow.        Patient discussed with attending, Dr. Swain and MD GABRIELA Powell. ZackNayeli, [address] [facility]  [id number]

## 2021-07-11 NOTE — ED ADULT NURSE NOTE - OBJECTIVE STATEMENT
patient is a 51 year old female with a PMH of graves disease, asthma who presents to the ED from home  s/p laparoscopic procedure 07/01/2021 with complication requiring open procedure. patient states tonight while laying in bed patient noted bleeding from post op wound on abdomen; no active bleeding now. patient dressed wound at home. patient states drainage "is fluid mixed with blood" site is clean, dry intact. no signs of infection noted. patient is aaox3, lungs CTA bilaterally, abd soft, nondistended, tender along surigical site. patient denies chest pain, shortness of breath, ha, dizziness, weakness, numb patient is a 51 year old female with a PMH of graves disease, asthma who presents to the ED from home s/p laparoscopic procedure 07/01/2021 with complication requiring open procedure. patient states tonight while laying in bed patient noted bleeding from post op wound on abdomen; no active bleeding now. patient dressed wound at home. patient states drainage "is fluid mixed with blood" site is clean, dry intact. no signs of infection noted. patient is aaox3, lungs CTA bilaterally, abd soft, nondistended, tender along surgical site. patient denies chest pain, shortness of breath, ha, dizziness, weakness, numbness, tingling, fever, chills, cough, abd pain, back pain, changes in bowel or bladder, hematuria, bloody stool. bleeding controlled at this time. MD at bedside to assess.

## 2021-07-11 NOTE — ED PROVIDER NOTE - CARE PROVIDER_API CALL
Cheo Blancas  General Surgery  [address] [place] [address], [place] [address]  Phone: [phone]  Fax: [phone]  Established Patient  Follow Up Time: Routine

## 2021-07-11 NOTE — ED ADULT NURSE NOTE - CHIEF COMPLAINT QUOTE
s/p laparoscopic procedure 07/01/2021 with complication requiring open procedure; tonight noted bleeding from post op wound on abdomen; no active bleeding now

## 2021-07-11 NOTE — ED PROVIDER NOTE - CLINICAL SUMMARY MEDICAL DECISION MAKING FREE TEXT BOX
51F 10 days post op from laparotomy for median arcuate ligament release presenting with 2 hours of serous leakage from healing incision. Post operative course was uncomplicated per surgical note and she was stable upon discharge from the hospital on 7/5/21. Patient is asymptomatic otherwise and afebrile with stable vitals in the ED. Exam showed minimal serous leakage and skin changes suspicious for cellulitis without pustules surrounding inferior portion of the incision, with soft mass behind incision. Most likely seroma, possible cellulitis. CBC and CMP drawn and CT of abdomen and pelvis ordered. Home pain control regimen continued in ED she was given 975mg of tylenol. Will contact Dr. Blancas/Dr. Arias who performed operation.

## 2021-07-11 NOTE — ED PROVIDER NOTE - OBJECTIVE STATEMENT
51 year old lady 10 days post-op presenting with leakage of clear fluid from abdominal incision from surgery on 7/1/21. The surgery was a median arcuate ligament release laparoscopic converted to laparotomy due to need of celiac artery repair. Patient woke up from sleep today wet with clear fluid and some redness of the skin surrounding the incision. Patient denies any other symptoms including fever, pain at the site, abdominal pain, chest pain, shortness of breath or extremity pain.

## 2021-07-11 NOTE — ED PROVIDER NOTE - NS ED ROS FT
CONSTITUTIONAL - No fever or chills  SKIN - erythema surrounding inferior aspect of incision  HEMATOLOGIC - No abnormal bleeding  RESPIRATORY - No shortness of breath  CARDIAC -No chest pain  GI - No abdominal pain, No nausea  MUSCULOSKELETAL - No joint pain, No swelling, No back pain  NEUROLOGIC - No numbness, No focal weakness, No headache, No dizziness

## 2021-07-11 NOTE — ED PROVIDER NOTE - PATIENT PORTAL LINK FT
You can access the FollowMyHealth Patient Portal offered by [facility] by registering at the following website: [email or web address]. By joining [employer]’s FollowMyHealth portal, you will also be able to view your health information using other applications (apps) compatible with our system.

## 2021-07-11 NOTE — ED PROVIDER NOTE - ATTENDING CONTRIBUTION TO CARE
Patient presenting complaining of oozing/bleeding from surgical incision site.  Had recent procedure for release of medial arcuate ligament complicated by intraoperative injury to aorta which was repaired.  Has been healing well since procedure, this evening noted small bleeding and serous oozing from incision.  No fevers or chills, having ongoing abdominal pains.  On exam small amount of erythema around staples, no active bleeding appreciated, some serous oozing from midline incision.  Will obtain labs, CT A/P for post operative seroma, consult surgery.

## 2021-07-11 NOTE — ED ADULT NURSE REASSESSMENT NOTE - NS ED NURSE REASSESS COMMENT FT1
Report received from RN. Pt received A&Ox3, IV intact and patent, VSS, pt denies pain/discomfort, pt sitting in wheelchair locked for comfort, safety and comfort measures maintained.

## 2021-07-11 NOTE — ED ADULT NURSE NOTE - PMH
Arthritis    Asthma  well controlled, denies hospitalizations/intubations  Cystic fibrosis carrier    Frequent sinus infections    Graves disease  tx'd with Methamizole- completed several years ago, now Euthyroid  H/O abdominal pain    H/O abnormal weight loss  over past 2 months lost 40lbs since she cannot tolerate solid food  H/O carpal tunnel syndrome    H/O neuropathy  controlled on Gabapentin  History of 2019 novel coronavirus disease (COVID-19)  January 2021- did not require hospitalization/oxygen  CHRIS (obstructive sleep apnea)  rx'd dental apparatus  Seasonal allergies

## 2021-07-11 NOTE — ED ADULT NURSE REASSESSMENT NOTE - NS ED NURSE REASSESS COMMENT FT1
report received from KELLY grimm. pt resting in bed. abdominal incision reddened and draining purulent drainage in between midline staples, dressing applied. pt denies pain at this time

## 2021-07-12 ENCOUNTER — APPOINTMENT (OUTPATIENT)
Dept: SURGERY | Facility: CLINIC | Age: 51
End: 2021-07-12
Payer: COMMERCIAL

## 2021-07-12 VITALS
DIASTOLIC BLOOD PRESSURE: 78 MMHG | HEIGHT: 62.5 IN | HEART RATE: 74 BPM | OXYGEN SATURATION: 98 % | TEMPERATURE: 96.8 F | BODY MASS INDEX: 33.97 KG/M2 | WEIGHT: 189.31 LBS | SYSTOLIC BLOOD PRESSURE: 120 MMHG | RESPIRATION RATE: 16 BRPM

## 2021-07-12 PROCEDURE — 99024 POSTOP FOLLOW-UP VISIT: CPT

## 2021-07-12 NOTE — PLAN
[FreeTextEntry1] : [] Continue aspirin as per vascular instructions\par [] She will see vascular surgery (Dr. Aguilar) for f/u abdominal duplex in approximately 2 months\par [] Wound was repacked with lower pole staples removed, and visiting nurse will be arranged for daily dressing changes\par \par Follow-up with me on Friday for wound check and remaining staple removal

## 2021-07-12 NOTE — PHYSICAL EXAM
[Obese, well nourished, in no acute distress] : obese, well nourished, in no acute distress [Normal] : PERRL, EOMI, no conjunctival infection, anicteric [de-identified] : nl respirations [de-identified] : soft, nontender, nondistended.  Upper midline abdominal incision with staples, no erythema or fluctuance.  There is a small 1.5 cm opening near the lower pole of the incision.  There is some serosanguineous drainage without purulence.

## 2021-07-12 NOTE — REASON FOR VISIT
[Post Operative Visit] : a post operative visit for [FreeTextEntry2] : S/p Robotic assisted laparoscopic median arcuate ligament release with conversion to laparotomy and repair of celiac arteriotomy 07/01/21

## 2021-07-12 NOTE — ASSESSMENT
[FreeTextEntry1] : 51-year-old female recovering well status post laparoscopic converted to open median arcuate ligament release on 7/1/2021.\par She has had some drainage from the wound consistent with fat necrosis without evidence of hematoma or infection.\par She has had resolution of her postprandial abdominal pain.

## 2021-07-12 NOTE — HISTORY OF PRESENT ILLNESS
[de-identified] : The patient is a 51-year-old female status post robot-assisted laparoscopic median arcuate ligament release converted to open on 7/1/2021. [de-identified] : The patient presents today for her first postoperative visit.  She had been doing well at home, however yesterday morning awoke to find significant blood-tinged drainage from her midline abdominal wound.  She presented to the ER for this, where work-up included CT scan, which demonstrated some subcutaneous fluid without evidence of abscess or intra-abdominal pathology.  She reports no fevers or chills.  Her incisional pain is improving daily, and she is taking Tylenol and ibuprofen for this.  She is tolerating a regular diet and reports postprandial pain has resolved since the surgery.\par \par In the ER, 2 staples were removed and a small skin opening was probed.  There was no purulence noted at that time.  The wound was packed with packing strips.\par \par Today, she feels she is doing well, reports minimal incisional pain, and otherwise feels well. normal performance

## 2021-07-13 ENCOUNTER — APPOINTMENT (OUTPATIENT)
Dept: SURGERY | Facility: CLINIC | Age: 51
End: 2021-07-13

## 2021-07-14 ENCOUNTER — APPOINTMENT (OUTPATIENT)
Dept: SURGERY | Facility: CLINIC | Age: 51
End: 2021-07-14
Payer: COMMERCIAL

## 2021-07-14 VITALS
SYSTOLIC BLOOD PRESSURE: 136 MMHG | OXYGEN SATURATION: 97 % | TEMPERATURE: 98.1 F | RESPIRATION RATE: 16 BRPM | DIASTOLIC BLOOD PRESSURE: 80 MMHG | HEART RATE: 90 BPM

## 2021-07-14 PROCEDURE — 99024 POSTOP FOLLOW-UP VISIT: CPT

## 2021-07-14 NOTE — PHYSICAL EXAM
[de-identified] : Well-appearing, no acute distress [de-identified] : Soft, nontender, nondistended.Upper midline abdominal wound with staples, no erythema or fluctuance. 1.5 cm opening near the lower pole of the incision with packing in place, fascia intact with probing.  Remaining staples removed and 2cm opening at superior pole with old hematoma and serosanguinous fluid expressed.  Fascia was probed and is intact.  No surrounding erythema.

## 2021-07-14 NOTE — PLAN
[FreeTextEntry1] : All staples removed at this visit and subcutaneous fluid drained/expressed.  Wound was packed at superior and inferior pole openings with wet to dry gauze.\par Plan to continue daily dressing changes by visiting nurse.\par Follow-up in 2 days for wound check

## 2021-07-14 NOTE — HISTORY OF PRESENT ILLNESS
[de-identified] : The patient is a 51-year-old female status post robot-assisted laparoscopic median arcuate ligament release converted to open on 7/1/2021. She presents for a wound check. \par Has had visiting nurse doing daily dressing changes for her, with packing strips at the inferior aspect of the wound and covering with gauze.  She has noted new drainage from the top of the wound today.  She reports no fevers or chills.  No new pain or discomfort.

## 2021-07-14 NOTE — ASSESSMENT
[FreeTextEntry1] : 51-year-old female status post laparoscopic converted to open median arcuate ligament release on 7/1/2021 with SSI, subcutaneous.

## 2021-07-16 ENCOUNTER — APPOINTMENT (OUTPATIENT)
Dept: SURGERY | Facility: CLINIC | Age: 51
End: 2021-07-16
Payer: COMMERCIAL

## 2021-07-16 VITALS
OXYGEN SATURATION: 99 % | WEIGHT: 190 LBS | RESPIRATION RATE: 17 BRPM | SYSTOLIC BLOOD PRESSURE: 106 MMHG | HEIGHT: 62 IN | DIASTOLIC BLOOD PRESSURE: 74 MMHG | TEMPERATURE: 98.1 F | HEART RATE: 87 BPM | BODY MASS INDEX: 34.96 KG/M2

## 2021-07-16 PROCEDURE — 99024 POSTOP FOLLOW-UP VISIT: CPT

## 2021-07-16 NOTE — HISTORY OF PRESENT ILLNESS
[de-identified] : The patient is a 51-year-old female status post robot-assisted laparoscopic median arcuate ligament release converted to open on 7/1/2021. \par She presents today for wound check.  She has been doing well, reports minimal drainage from the wound, which has been dressed daily by visiting nurses.  She has minimal incisional pain.  She is tolerating a regular diet without postprandial abdominal pain.

## 2021-07-16 NOTE — PHYSICAL EXAM
[Obese, well nourished, in no acute distress] : obese, well nourished, in no acute distress [Normal] : PERRL, EOMI, no conjunctival infection, anicteric [de-identified] : nl respirations [de-identified] : soft, nontender, nondistended.  Upper midline abdominal incision with no erythema or fluctuance.  1.5cm superficial opening at upper pole of incision, with underlying fascia intact, and some fibrinous exudate.  Good granulation tissue seen.  2 cm opening at the inferior pole, with underlying fascia intact and minimal serosanguinous drainage.

## 2021-07-16 NOTE — PLAN
[FreeTextEntry1] : Wound was packed with dry gauze and covered with dry gauze.\par Continue daily dressing changes with visiting nurse\par Follow-up 1 week for wound check

## 2021-07-21 ENCOUNTER — APPOINTMENT (OUTPATIENT)
Dept: SURGERY | Facility: CLINIC | Age: 51
End: 2021-07-21
Payer: COMMERCIAL

## 2021-07-21 VITALS
HEIGHT: 62 IN | WEIGHT: 190 LBS | RESPIRATION RATE: 17 BRPM | SYSTOLIC BLOOD PRESSURE: 119 MMHG | BODY MASS INDEX: 34.96 KG/M2 | DIASTOLIC BLOOD PRESSURE: 82 MMHG | HEART RATE: 85 BPM | TEMPERATURE: 97.7 F | OXYGEN SATURATION: 99 %

## 2021-07-21 PROCEDURE — 99024 POSTOP FOLLOW-UP VISIT: CPT

## 2021-07-21 NOTE — PLAN
[FreeTextEntry1] : Continue gauze packing.  Will recommend this be done twice daily.\par Follow-up with me in 1 week.

## 2021-07-21 NOTE — ASSESSMENT
[FreeTextEntry1] : 51-year-old female status post laparoscopic converted to open median arcuate ligament release on 7/1/2021.\par Wound continues to heal well, improved from prior.  No evidence of infection.

## 2021-07-21 NOTE — PHYSICAL EXAM
[Obese, well nourished, in no acute distress] : obese, well nourished, in no acute distress [Normal] : PERRL, EOMI, no conjunctival infection, anicteric [de-identified] : nl respirations [de-identified] : soft, nontender, nondistended.  Upper midline abdominal incision with no erythema or fluctuance.  1.2cm superficial opening at upper pole of incision, with underlying fascia intact, and very minimal fibrinous exudate.  Good granulation tissue at base and looks improved from prior.  1.5 cm superficial opening at the inferior pole, with underlying fascia intact and minimal serosanguinous drainage.  Good clean granulation tissue at base, improved from prior.

## 2021-07-21 NOTE — HISTORY OF PRESENT ILLNESS
[de-identified] : Selena is a 51-year-old female status post robot-assisted laparoscopic median arcuate ligament release converted to open on 7/1/2021. \par She presents today for wound check.  Her visiting nurse has been changing her wound packing once per day.  This morning she noted that there has been some whitish/greenish discharge from the wound and recommended it be evaluated.  The patient reports persistent improvement in abdominal discomfort.  She has minimal pain.  She is ambulating well.   Continues to tolerate regular diet with no postprandial pain or discomfort.\par

## 2021-07-23 NOTE — REASON FOR VISIT
[Post Operative Visit] : a post operative visit for [Follow-Up Visit] : a follow-up visit for [S/P Bariatric Surgery] : s/p bariatric surgery

## 2021-07-26 ENCOUNTER — APPOINTMENT (OUTPATIENT)
Dept: SURGERY | Facility: CLINIC | Age: 51
End: 2021-07-26
Payer: COMMERCIAL

## 2021-07-26 VITALS
DIASTOLIC BLOOD PRESSURE: 70 MMHG | WEIGHT: 186.5 LBS | TEMPERATURE: 98.2 F | RESPIRATION RATE: 17 BRPM | SYSTOLIC BLOOD PRESSURE: 102 MMHG | OXYGEN SATURATION: 98 % | BODY MASS INDEX: 34.32 KG/M2 | HEART RATE: 93 BPM | HEIGHT: 62 IN

## 2021-07-26 PROCEDURE — 99024 POSTOP FOLLOW-UP VISIT: CPT

## 2021-07-26 NOTE — ASSESSMENT
[FreeTextEntry1] : 51-year-old female status post laparoscopic converted to open median arcuate ligament release on 7/1/2021.\par Wound continues to heal well, improved from prior.

## 2021-07-26 NOTE — HISTORY OF PRESENT ILLNESS
[de-identified] : Selena is a 51-year-old female status post robot-assisted laparoscopic median arcuate ligament release converted to open on 7/1/2021. \par She returns today for wound check.  She is doing well and has no complaints.  She states she has been doing the dressing changes by herself without problem.  She notes that wound openings have significantly diminished in size with less drainage.  She reports no fevers or chills.

## 2021-07-26 NOTE — PHYSICAL EXAM
[Obese, well nourished, in no acute distress] : obese, well nourished, in no acute distress [Normal] : PERRL, EOMI, no conjunctival infection, anicteric [de-identified] : nl respirations [de-identified] : soft, nontender, nondistended.  Upper midline abdominal incision with no erythema or fluctuance.  0.7cm superficial opening at upper pole of incision, with underlying fascia intact, and very minimal fibrinous exudate.  Good granulation tissue at base and looks improved from prior.  1.0 cm superficial opening at the inferior pole, with underlying fascia intact and minimal serosanguinous drainage.  Good clean granulation tissue at base, improved from prior.

## 2021-08-02 ENCOUNTER — APPOINTMENT (OUTPATIENT)
Dept: SURGERY | Facility: CLINIC | Age: 51
End: 2021-08-02
Payer: COMMERCIAL

## 2021-08-02 ENCOUNTER — APPOINTMENT (OUTPATIENT)
Dept: CARDIOLOGY | Facility: CLINIC | Age: 51
End: 2021-08-02
Payer: COMMERCIAL

## 2021-08-02 ENCOUNTER — NON-APPOINTMENT (OUTPATIENT)
Age: 51
End: 2021-08-02

## 2021-08-02 VITALS
SYSTOLIC BLOOD PRESSURE: 119 MMHG | HEIGHT: 62 IN | TEMPERATURE: 98 F | HEART RATE: 95 BPM | BODY MASS INDEX: 33.68 KG/M2 | DIASTOLIC BLOOD PRESSURE: 81 MMHG | WEIGHT: 183 LBS | RESPIRATION RATE: 12 BRPM | OXYGEN SATURATION: 99 %

## 2021-08-02 VITALS
HEIGHT: 62 IN | HEART RATE: 95 BPM | DIASTOLIC BLOOD PRESSURE: 81 MMHG | WEIGHT: 183 LBS | SYSTOLIC BLOOD PRESSURE: 119 MMHG | BODY MASS INDEX: 33.68 KG/M2 | OXYGEN SATURATION: 99 %

## 2021-08-02 VITALS
TEMPERATURE: 98.2 F | WEIGHT: 183 LBS | BODY MASS INDEX: 33.68 KG/M2 | DIASTOLIC BLOOD PRESSURE: 79 MMHG | SYSTOLIC BLOOD PRESSURE: 111 MMHG | OXYGEN SATURATION: 95 % | HEART RATE: 90 BPM | HEIGHT: 62 IN | RESPIRATION RATE: 16 BRPM

## 2021-08-02 PROCEDURE — 99024 POSTOP FOLLOW-UP VISIT: CPT

## 2021-08-02 PROCEDURE — 93000 ELECTROCARDIOGRAM COMPLETE: CPT

## 2021-08-02 PROCEDURE — 99214 OFFICE O/P EST MOD 30 MIN: CPT

## 2021-08-02 NOTE — REASON FOR VISIT
[Symptom and Test Evaluation] : symptom and test evaluation [CV Risk Factors and Non-Cardiac Disease] : CV risk factors and non-cardiac disease [FreeTextEntry1] : Patient is a 51 year old female  who will require surgery for celiac artery obstruction.  Imaging reviewed.  Her surgery has not been scheduled but will be open abdominal procedure\par Denies htn, diabetes, elevated cholesterol.  Hx of Graves disease which she reports is in remission.  \par No cardiac testing in the last 10 years.\par non smoker, no alcohol, no drug use.\par hx of mild asthma\par arthritis in knees\par no outside records provided\par \par 8/2/2021  s/p surgery complicated by artery perforation and conversion to open surgery.  no cardiac issues in the setting of open emergent surgery.  no chest pain, dyspnea or edema.

## 2021-08-02 NOTE — HISTORY OF PRESENT ILLNESS
[FreeTextEntry1] : \par \par Patient is a 51 year old who will require surgery for celiac artery obstruction.  Imaging reviewed.  Her surgery has not been scheduled.  \par Denies htn, diabetes, elevated cholesterol.  Hx of Graves disease which she reports is in remission.  \par No cardiac testing in the last 10 years.\par non smoker, no alcohol, no drug use.\par hx of mild asthma\par no functional limitations.\par \par 6/21/2021  testing reviewed.  echo with normal EF and no valvuar disease.  nuclear images appear to correct with prone imaging.  and normal LV function.  surgery now scheduled for July1st...\par \par \par 8/2/2021  s/p surgery complicated by artery perforation and conversion to open surgery.  no cardiac issues in the setting of open emergent surgery.  no chest pain, dyspnea or edema.

## 2021-08-02 NOTE — DISCUSSION/SUMMARY
[ECG Normal Variant] : ECG normal variant [Stable] : stable [FreeTextEntry1] : 51 year old s.p median arcuate surgery with open conversion.  normal echo and stress.  normal blood pressure.  no cardiac sx post op,  pt can f.u on prn  basis.

## 2021-08-02 NOTE — PHYSICAL EXAM

## 2021-08-02 NOTE — ASSESSMENT
[FreeTextEntry1] : 51-year-old female status post laparoscopic converted to open median arcuate ligament release on 7/1/2021.\par Wound continues to heal well, improved from prior.  \par She reports persistent postprandial diarrhea without associated abdominal pain or cramping.

## 2021-08-02 NOTE — PLAN
[FreeTextEntry1] : Continue wound dressing changes.\par Ordered CBC and stool C. difficile\par Will have abdominal duplex and will see vascular surgery next month\par Follow-up with me 1 week

## 2021-08-02 NOTE — HISTORY OF PRESENT ILLNESS
[de-identified] : Selena is a 51-year-old female status post robot-assisted laparoscopic median arcuate ligament release converted to open on 7/1/2021. \par She presents today for a wound check.  She continues to do daily dry gauze dressing changes.  The upper wound has completely closed.  The lower wound has had minimal discharge on the gauze.  Her incisional pain is improving daily.  She takes Tylenol or ibuprofen occasionally.  She is eating well and does not report postprandial pain.\par \par She does report she has been having diarrhea frequently.  This usually occurs postprandially.  She does not report any abdominal pain or cramping associated with this.  Diarrhea is nonbloody.\par

## 2021-08-02 NOTE — PHYSICAL EXAM
[Obese, well nourished, in no acute distress] : obese, well nourished, in no acute distress [Normal] : PERRL, EOMI, no conjunctival infection, anicteric [de-identified] : nl respirations [de-identified] : soft, nontender, nondistended.  Upper midline abdominal incision with no erythema or fluctuance.   1.0 cm superficial opening at the inferior pole, with underlying fascia intact and minimal serosanguinous drainage.  Good clean granulation tissue at base, improved from prior.

## 2021-08-09 ENCOUNTER — APPOINTMENT (OUTPATIENT)
Dept: SURGERY | Facility: CLINIC | Age: 51
End: 2021-08-09
Payer: COMMERCIAL

## 2021-08-09 VITALS
OXYGEN SATURATION: 97 % | WEIGHT: 186 LBS | DIASTOLIC BLOOD PRESSURE: 77 MMHG | RESPIRATION RATE: 17 BRPM | TEMPERATURE: 98 F | BODY MASS INDEX: 34.23 KG/M2 | HEART RATE: 71 BPM | SYSTOLIC BLOOD PRESSURE: 123 MMHG | HEIGHT: 62 IN

## 2021-08-09 PROCEDURE — 99024 POSTOP FOLLOW-UP VISIT: CPT

## 2021-08-09 NOTE — ASSESSMENT
[FreeTextEntry1] : 51-year-old female status post laparoscopic converted to open median arcuate ligament release on 7/1/2021.\par Wound continues to heal well, improved from prior.  \par

## 2021-08-09 NOTE — PLAN
[FreeTextEntry1] : Continue twice daily dressing changes.\par Later this week, will change to 1/4 inch packing tape instead of gauze as the wound opening is quite small.\par \par Follow-up 1 week

## 2021-08-09 NOTE — HISTORY OF PRESENT ILLNESS
[de-identified] : Selena is a 51-year-old female status post robot-assisted laparoscopic median arcuate ligament release converted to open on 7/1/2021.\par She presents today for wound check.  She is doing well, however still reports some loose bowel movements in the evenings.  She had stool studies sent yesterday.  She has no fevers, chills, or malaise.  She is changing her wound dressing twice per day and notices a little bit of staining on the gauze, but otherwise is doing well.

## 2021-08-09 NOTE — PHYSICAL EXAM
[Obese, well nourished, in no acute distress] : obese, well nourished, in no acute distress [Normal] : PERRL, EOMI, no conjunctival infection, anicteric [de-identified] : nl respirations [de-identified] : soft, nontender, nondistended.  Upper midline abdominal incision with no erythema or fluctuance.   8mm superficial opening at the inferior pole, with underlying fascia intact and minimal serosanguinous drainage.  Good clean granulation tissue at base, improved from prior.

## 2021-08-10 LAB
C DIFF TOX GENS STL QL NAA+PROBE: NORMAL
CDIFF BY PCR: NOT DETECTED

## 2021-08-16 ENCOUNTER — APPOINTMENT (OUTPATIENT)
Dept: SURGERY | Facility: CLINIC | Age: 51
End: 2021-08-16
Payer: COMMERCIAL

## 2021-08-16 VITALS
BODY MASS INDEX: 34.41 KG/M2 | SYSTOLIC BLOOD PRESSURE: 135 MMHG | HEART RATE: 75 BPM | HEIGHT: 62 IN | DIASTOLIC BLOOD PRESSURE: 87 MMHG | WEIGHT: 187 LBS | RESPIRATION RATE: 18 BRPM | TEMPERATURE: 98.6 F | OXYGEN SATURATION: 99 %

## 2021-08-16 PROCEDURE — 99024 POSTOP FOLLOW-UP VISIT: CPT

## 2021-08-16 NOTE — PLAN
[FreeTextEntry1] : Continue daily packing changes for wound.\par Follow up with me in 1 week.\par Referred to GI for further work-up of diarrhea.\par

## 2021-08-16 NOTE — PHYSICAL EXAM
[Obese, well nourished, in no acute distress] : obese, well nourished, in no acute distress [Normal] : PERRL, EOMI, no conjunctival infection, anicteric [de-identified] : nl respirations [de-identified] : soft, nontender, nondistended.  Upper midline abdominal incision with no erythema or fluctuance.   7mm superficial opening at the inferior pole, with underlying fascia intact and minimal serosanguinous drainage.  Good clean granulation tissue at base, improved from prior.

## 2021-08-16 NOTE — HISTORY OF PRESENT ILLNESS
[de-identified] : TODD is a 51 year old female here for wound check s/p robot-assisted laparoscopic median arcuate ligament release converted to open on 7/1/2021. \par Her wound is healing well, she continues packing it daily, she has not noted any increased drainage or redness.  She reports no fevers.\par She does continue to complain of 1 or more bouts of diarrhea every day.  She states it is not after every meal and cannot pinpoint what types of foods it is related to.  We did send C. difficile previously, which was negative.  She does report some lower abdominal gas discomfort associated with the diarrhea.  She does not report any epigastric pain or postprandial pain.\par

## 2021-08-16 NOTE — ASSESSMENT
[FreeTextEntry1] : 51-year-old female status post laparoscopic converted to open median arcuate ligament release on 7/1/2021.\par Wound continues to heal well, improved from prior.  \par She complains of frequent diarrhea, with recent C. difficile reported negative.\par

## 2021-08-23 ENCOUNTER — APPOINTMENT (OUTPATIENT)
Dept: SURGERY | Facility: CLINIC | Age: 51
End: 2021-08-23
Payer: COMMERCIAL

## 2021-08-23 ENCOUNTER — RESULT REVIEW (OUTPATIENT)
Age: 51
End: 2021-08-23

## 2021-08-23 VITALS
SYSTOLIC BLOOD PRESSURE: 119 MMHG | HEART RATE: 84 BPM | HEIGHT: 62 IN | OXYGEN SATURATION: 95 % | WEIGHT: 186 LBS | RESPIRATION RATE: 17 BRPM | BODY MASS INDEX: 34.23 KG/M2 | TEMPERATURE: 98.1 F | DIASTOLIC BLOOD PRESSURE: 83 MMHG

## 2021-08-23 PROCEDURE — 99024 POSTOP FOLLOW-UP VISIT: CPT

## 2021-08-23 NOTE — PHYSICAL EXAM
[Obese, well nourished, in no acute distress] : obese, well nourished, in no acute distress [Normal] : PERRL, EOMI, no conjunctival infection, anicteric [de-identified] : nl respirations [de-identified] : soft, nontender, nondistended.  Upper midline abdominal incision with no erythema or fluctuance.   5 mm superficial opening at the inferior pole, with underlying clean granulation tissue at base, improved from prior.

## 2021-08-23 NOTE — ASSESSMENT
[FreeTextEntry1] : 51-year-old female status post laparoscopic converted to open median arcuate ligament release on 7/1/2021.\par Wound continues to heal well, improved from prior.  \par \par

## 2021-08-23 NOTE — HISTORY OF PRESENT ILLNESS
[de-identified] : Selena presents for wound check.\par Wound continues to heal well, improved from prior. \par She states she continues to have loose bowel movements and has contacted her GI for an appointment, as we discussed previously.  She is waiting to hear back from the gastroenterologist to schedule the appointment.  She otherwise is doing well.  Weight has been stable.\par

## 2021-08-23 NOTE — PLAN
[FreeTextEntry1] : Continue daily wound packing with packing strips\par To have abdominal duplex and schedule f/u appointment with vascular surgery next month\par To schedule f/u with GI for further workup of diarrhea.\par \par F/u with me in 2 weeks\par

## 2021-08-31 ENCOUNTER — APPOINTMENT (OUTPATIENT)
Dept: ULTRASOUND IMAGING | Facility: CLINIC | Age: 51
End: 2021-08-31
Payer: COMMERCIAL

## 2021-08-31 ENCOUNTER — OUTPATIENT (OUTPATIENT)
Dept: OUTPATIENT SERVICES | Facility: HOSPITAL | Age: 51
LOS: 1 days | End: 2021-08-31
Payer: COMMERCIAL

## 2021-08-31 DIAGNOSIS — Z98.891 HISTORY OF UTERINE SCAR FROM PREVIOUS SURGERY: Chronic | ICD-10-CM

## 2021-08-31 DIAGNOSIS — Z98.890 OTHER SPECIFIED POSTPROCEDURAL STATES: Chronic | ICD-10-CM

## 2021-08-31 DIAGNOSIS — Z00.8 ENCOUNTER FOR OTHER GENERAL EXAMINATION: ICD-10-CM

## 2021-08-31 DIAGNOSIS — I77.4 CELIAC ARTERY COMPRESSION SYNDROME: ICD-10-CM

## 2021-08-31 PROCEDURE — 93975 VASCULAR STUDY: CPT

## 2021-08-31 PROCEDURE — 93975 VASCULAR STUDY: CPT | Mod: 26

## 2021-09-09 ENCOUNTER — TRANSCRIPTION ENCOUNTER (OUTPATIENT)
Age: 51
End: 2021-09-09

## 2021-09-09 PROBLEM — I77.79 CELIAC ARTERY DISSECTION: Status: RESOLVED | Noted: 2021-09-09 | Resolved: 2021-09-09

## 2021-09-10 ENCOUNTER — APPOINTMENT (OUTPATIENT)
Dept: SURGERY | Facility: CLINIC | Age: 51
End: 2021-09-10
Payer: COMMERCIAL

## 2021-09-10 VITALS
OXYGEN SATURATION: 98 % | DIASTOLIC BLOOD PRESSURE: 75 MMHG | RESPIRATION RATE: 16 BRPM | SYSTOLIC BLOOD PRESSURE: 107 MMHG | BODY MASS INDEX: 34.1 KG/M2 | TEMPERATURE: 97.6 F | HEIGHT: 62 IN | HEART RATE: 69 BPM | WEIGHT: 185.31 LBS

## 2021-09-10 DIAGNOSIS — I77.79 DISSECTION OF OTHER SPECIFIED ARTERY: ICD-10-CM

## 2021-09-10 PROCEDURE — 99024 POSTOP FOLLOW-UP VISIT: CPT

## 2021-09-10 NOTE — PHYSICAL EXAM
[Obese] : obese [Normal Female] : normal external genitalia, urethral meatus without lesions or discharge. Bladder non-distended, without tenderness. Vagina and cervix normal on visual inspection. On bimanual exam uterus, adnexa, parametria all normal without any discrete masses. [Normal] : affect appropriate [de-identified] : nontender, nondistended.  Tiny superficial opening within the celiotomy incision + serous drainage-> No Herniae. No Redness. [FreeTextEntry1] : Deferred

## 2021-09-10 NOTE — ASSESSMENT
[FreeTextEntry1] : 51-year-old female status post robotic converted to open median arcuate ligament release on 7/1/2021.\par Wound continues to heal well, improved from prior.  \par Symptoms of chills, temperature dysregulation\par Diarrhea improving\par Postprandial pain completely resolved\par \par

## 2021-09-10 NOTE — PLAN
[FreeTextEntry1] : Thyroid Fxn Tests by PCP\par Vascular surgery follow up scheduled for next month\par Educated on safety and efficacy of COVID vaccine\par F/u with me in 1-2 months

## 2021-09-10 NOTE — REVIEW OF SYSTEMS
[Chills] : chills [Fatigue] : fatigue [Diarrhea] : diarrhea [Muscle Weakness] : muscle weakness [Negative] : Allergic/Immunologic [Night Sweats] : no night sweats [Recent Change In Weight] : ~T no recent weight change [Abdominal Pain] : no abdominal pain [Vomiting] : no vomiting [Constipation] : no constipation [Reflux/Heartburn] : no reflux/ heartburn [Hernia] : no hernia

## 2021-09-10 NOTE — REASON FOR VISIT
[Post Operative Visit] : a post operative visit for [Other___] : [unfilled] [Other: _____] : [unfilled] [FreeTextEntry2] : f/u results, wound check  01-Jan-2016

## 2021-09-10 NOTE — HISTORY OF PRESENT ILLNESS
[Date of Surgery: ___] : Date of Surgery:   [unfilled] [___ Months Post Op] : [unfilled] months [de-identified] : Selena is here for a post op visit following robotic converted to open median arcuate ligament release on 7/1/21. \par Post op uneventful recovery\par S/B GI for Loose BMs\par C/O chills and UE tingling.\par PMH of THyroid Disease\par A/W to be S/B Vasc in a few days\par US Doppler CA and Aorta reviewed.\par Post -op AAW Laparotomy Wound assessed.\par No other systemic /GI Symptoms\par Pre-Op Visceral Claudication Symp resolved. \par  [de-identified] : Interval resolution of pre-op symptoms.\par AAW getting better per pt.

## 2021-09-21 ENCOUNTER — APPOINTMENT (OUTPATIENT)
Dept: VASCULAR SURGERY | Facility: CLINIC | Age: 51
End: 2021-09-21
Payer: COMMERCIAL

## 2021-09-21 VITALS
DIASTOLIC BLOOD PRESSURE: 79 MMHG | BODY MASS INDEX: 34.04 KG/M2 | HEART RATE: 82 BPM | SYSTOLIC BLOOD PRESSURE: 119 MMHG | HEIGHT: 62 IN | WEIGHT: 185 LBS | TEMPERATURE: 98.7 F

## 2021-09-21 DIAGNOSIS — I77.4 CELIAC ARTERY COMPRESSION SYNDROME: ICD-10-CM

## 2021-09-21 PROCEDURE — 93975 VASCULAR STUDY: CPT

## 2021-09-21 PROCEDURE — 93971 EXTREMITY STUDY: CPT | Mod: RT

## 2021-09-21 PROCEDURE — 99024 POSTOP FOLLOW-UP VISIT: CPT

## 2021-09-21 NOTE — PHYSICAL EXAM
[Ankle Swelling (On Exam)] : present [Ankle Swelling On The Right] : of the right ankle [Ankle Swelling On The Left] : moderate [No Rash or Lesion] : No rash or lesion [Oriented to Person] : oriented to person [Alert] : alert [Oriented to Place] : oriented to place [Oriented to Time] : oriented to time [Calm] : calm [JVD] : no jugular venous distention  [] : not present [Varicose Veins Of Lower Extremities] : not present [Abdomen Masses] : No abdominal masses [Tender] : was nontender [Stool Sample Taken] : No stool obtained  on rectal exam [de-identified] : nad [de-identified] : wnl [FreeTextEntry1] : abd incision healing well x 2 spots w serous drainage \par abd soft nt nd  [de-identified] : wnl [de-identified] : Wayne Cranial nerves 2-12 wayne grossly intact [de-identified] : cooperative

## 2021-09-21 NOTE — HISTORY OF PRESENT ILLNESS
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

## 2021-09-21 NOTE — ASSESSMENT
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

## 2021-09-21 NOTE — DATA REVIEWED
[FreeTextEntry1] : Outside facility report reviewed 8/31/2021  Mesenteric Duplex elevated  celiac axis  turbulent flow \par \par 9/21/2021  Abd Visceral Duplex  patent celiac axis w patent but significant turbulent flow \par                         Inspiration  371 cm expiration 648 cm \par                         patent sma

## 2021-10-11 ENCOUNTER — APPOINTMENT (OUTPATIENT)
Dept: SURGERY | Facility: CLINIC | Age: 51
End: 2021-10-11
Payer: COMMERCIAL

## 2021-10-11 VITALS
BODY MASS INDEX: 34.1 KG/M2 | HEIGHT: 62 IN | RESPIRATION RATE: 17 BRPM | TEMPERATURE: 97.9 F | DIASTOLIC BLOOD PRESSURE: 82 MMHG | HEART RATE: 76 BPM | SYSTOLIC BLOOD PRESSURE: 119 MMHG | OXYGEN SATURATION: 96 % | WEIGHT: 185.31 LBS

## 2021-10-11 DIAGNOSIS — Z09 ENCOUNTER FOR FOLLOW-UP EXAMINATION AFTER COMPLETED TREATMENT FOR CONDITIONS OTHER THAN MALIGNANT NEOPLASM: ICD-10-CM

## 2021-10-11 PROCEDURE — 99213 OFFICE O/P EST LOW 20 MIN: CPT

## 2021-10-11 NOTE — ASSESSMENT
[FreeTextEntry1] : 51-year-old female status post robotic converted to open median arcuate ligament release on 7/1/2021.\par Wound is healed.  \par Symptoms of chills, temperature dysregulation\par Diarrhea improving\par Postprandial pain completely resolved\par \par

## 2021-10-11 NOTE — PHYSICAL EXAM
[Obese] : obese [Normal Female] : normal external genitalia, urethral meatus without lesions or discharge. Bladder non-distended, without tenderness. Vagina and cervix normal on visual inspection. On bimanual exam uterus, adnexa, parametria all normal without any discrete masses. [Normal] : affect appropriate [de-identified] : nontender, nondistended.  Midline incision is well-healed without erythema or drainage. [FreeTextEntry1] : Deferred

## 2021-10-11 NOTE — PLAN
[FreeTextEntry1] : [] Referred to GI for follow-up of diarrhea\par [] Recommended that patient see her primary care for glucose testing\par [] Patient will follow up with vascular surgery in 2 months\par [] She will see me again in 3 months

## 2021-10-11 NOTE — HISTORY OF PRESENT ILLNESS
[de-identified] : Selena is here for a post op visit following robotic converted to open median arcuate ligament release on 7/1/21. \par Her abdominal wound has healed up nicely and she no longer has to do dressing changes.  She continues to report no postprandial abdominal pain.\par She does report continued loose bowel movements and is currently on Imodium and fiber, which she notes has been helping significantly.  She has cut out lactose from her diet.\par She also reports occasional episodes of lightheadedness, not associated with palpitations, chest pain, or shortness of breath.  She cannot give any inciting factors and reports that this happens sometimes when she is standing up and sometimes when sitting.  She also reports continued symptoms of "chills in my hands and feet."

## 2021-12-20 ENCOUNTER — APPOINTMENT (OUTPATIENT)
Dept: SURGERY | Facility: CLINIC | Age: 51
End: 2021-12-20
Payer: COMMERCIAL

## 2021-12-20 VITALS
HEIGHT: 62 IN | BODY MASS INDEX: 35.77 KG/M2 | DIASTOLIC BLOOD PRESSURE: 83 MMHG | HEART RATE: 87 BPM | TEMPERATURE: 98.1 F | OXYGEN SATURATION: 97 % | SYSTOLIC BLOOD PRESSURE: 128 MMHG | RESPIRATION RATE: 17 BRPM | WEIGHT: 194.38 LBS

## 2021-12-20 PROCEDURE — 99212 OFFICE O/P EST SF 10 MIN: CPT

## 2021-12-20 NOTE — PHYSICAL EXAM
[de-identified] : soft, nontender, nondistended.  Midline incision is well-healed without erythema or drainage. [FreeTextEntry1] : Deferred

## 2021-12-20 NOTE — PLAN
[FreeTextEntry1] : Patient to see Dr. Aguilar of vascular next week\par She has follow-up scheduled with Dr. Lacy of GI\par Follow-up with me in 6 months

## 2021-12-20 NOTE — HISTORY OF PRESENT ILLNESS
[de-identified] : TODD is a 51 year old female here for postoperative visit s/p robotic converted to open median arcuate ligament release on 07/01/21\par \par She is doing well in terms of recovery from the surgery.  She has absolutely no postprandial abdominal pain.  She is tolerating regular diet.  She still suffering from loose stools and urgency/frequency.  She has seen her gastroenterologist and had a colonoscopy.  She was told to start Imodium as needed, which she says has given adequate results.

## 2021-12-20 NOTE — ASSESSMENT
[FreeTextEntry1] : 51-year-old female status post robotic converted to open median arcuate ligament release on 7/1/2021.\par Wound is healed.  \par Postprandial pain completely resolved\par Still having diarrhea.\par \par

## 2021-12-27 ENCOUNTER — APPOINTMENT (OUTPATIENT)
Dept: VASCULAR SURGERY | Facility: CLINIC | Age: 51
End: 2021-12-27
Payer: COMMERCIAL

## 2021-12-27 PROCEDURE — 93975 VASCULAR STUDY: CPT

## 2022-01-12 NOTE — ED ADULT TRIAGE NOTE - MODE OF ARRIVAL
PRIMARY MEDICAL BENEFIT DETAILS (PHYSICIAN PURCHASE, OR REFERRAL TO TREATING SITE)  COVERAGE AVAILABLE: Yes COVERAGE DETAILS: Benefits subject to a $233.00 deductible ($0.00 met) and 20% co-insurance for the  administration and cost of Prolia. The benefits provided on this Verification of Benefits form are Medical  Benefits and are the patient's In-Network benefits for Prolia. If you would like Pharmacy Benefits for Prolia,  please call [phone].  AUTHORIZATION REQUIRED: No    SECONDARY MEDICAL BENEFIT DETAILS (PHYSICIAN PURCHASE, OR REFERRAL TO TREATING SITE)  COVERAGE AVAILABLE: Yes COVERAGE DETAILS: This is a Medicare Supplement Plan F and it covers the Medicare Part B  deductible, co-insurance and 100% of the excess charges. The benefits provided on this Verification of  Benefits form are Medical Benefits and are the patient's In-Network benefits for Prolia. If you would like  Pharmacy Benefits for Prolia, please call [phone].  AUTHORIZATION REQUIRED: No    Discussed benefits with patient. Prolia inj scheduled 2/10 at 2 pm.       [employer]

## 2022-06-01 ENCOUNTER — APPOINTMENT (OUTPATIENT)
Dept: VASCULAR SURGERY | Facility: CLINIC | Age: 52
End: 2022-06-01
Payer: COMMERCIAL

## 2022-06-01 PROCEDURE — 99443: CPT

## 2022-06-01 NOTE — ASSESSMENT
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

## 2022-06-01 NOTE — DATA REVIEWED
[FreeTextEntry1] : Outside facility report reviewed 8/31/2021  Mesenteric Duplex elevated  celiac axis  turbulent flow \par \par 9/21/2021  Abd Visceral Duplex  patent celiac axis w patent but significant turbulent flow \par                         Inspiration  371 cm expiration 648 cm \par                         patent sma \par \par 12/27/2021  Abd Visceral Duplex  patent celiac axis w patent but significant turbulent flow \par                         Inspiration  232 cm expiration 584 cm \par                         patent sma \par

## 2022-06-01 NOTE — PHYSICAL EXAM
[Ankle Swelling On The Right] : of the right ankle [Ankle Swelling On The Left] : moderate [No Rash or Lesion] : No rash or lesion [Alert] : alert [Oriented to Person] : oriented to person [Oriented to Place] : oriented to place [Oriented to Time] : oriented to time [Calm] : calm [Tender] : was nontender [de-identified] : no resp distress [FreeTextEntry1] : Physical exam findings via telephonic review with patient \par \par  [de-identified] : wnl [de-identified] : cooperative

## 2022-06-01 NOTE — REASON FOR VISIT
[Home] : at home, [unfilled] , at the time of the visit. [Medical Office: ([facility])___] : at the medical office located in  [Other:____] : [unfilled] [Verbal consent obtained from patient] : the patient, [unfilled] [FreeTextEntry1] : i feel better

## 2022-06-01 NOTE — HISTORY OF PRESENT ILLNESS
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  following laparoscopic  median\par arcuate lig release \par pt stated her pre op sx of intensity  from 10/10 are now 0/10\par pt states able to  to op but c/o\par of  fluttering feeling and  perhaps lightheadeness after eating\par pt denies  fevers or chills \par pt also c/o of sev days of right leg swelling and shin pain \par  [de-identified] : pt states ongoing "bowel problems" where she needs to run to the bathroom often \par for BM and this has limited her  social activities outside of the house \par

## 2022-06-27 ENCOUNTER — APPOINTMENT (OUTPATIENT)
Dept: VASCULAR SURGERY | Facility: CLINIC | Age: 52
End: 2022-06-27
Payer: COMMERCIAL

## 2022-06-27 PROCEDURE — 93976 VASCULAR STUDY: CPT

## 2022-07-06 ENCOUNTER — APPOINTMENT (OUTPATIENT)
Dept: VASCULAR SURGERY | Facility: CLINIC | Age: 52
End: 2022-07-06

## 2022-07-06 PROCEDURE — 99443: CPT

## 2022-07-06 RX ORDER — ESTRADIOL AND NORETHINDRONE ACETATE 1; .5 MG/1; MG/1
1-0.5 TABLET, FILM COATED ORAL
Qty: 84 | Refills: 0 | Status: ACTIVE | COMMUNITY
Start: 2022-03-21

## 2022-07-06 NOTE — HISTORY OF PRESENT ILLNESS
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  following laparoscopic  median\par arcuate lig release \par pt stated her pre op sx of intensity  from 10/10 are now 0/10\par pt states able to  to op but c/o\par of  fluttering feeling and  perhaps lightheadeness after eating\par pt denies  fevers or chills \par pt also c/o of sev days of right leg swelling and shin pain \par  [de-identified] : pt states ongoing "bowel problems" where she needs to run to the bathroom often \par for BM and this has limited her  social activities outside of the house \par overall unchanged  since last ov\par pt has not f/u yet w GI  to initiate  completion of GI w/u

## 2022-07-06 NOTE — DATA REVIEWED
[FreeTextEntry1] : Outside facility report reviewed 8/31/2021  Mesenteric Duplex elevated  celiac axis  turbulent flow \par \par 9/21/2021  Abd Visceral Duplex  patent celiac axis w patent but significant turbulent flow \par                         Inspiration  371 cm expiration 648 cm \par                         patent sma \par \par 12/27/2021  Abd Visceral Duplex  patent celiac axis w patent but significant turbulent flow \par                         Inspiration  232 cm expiration 584 cm \par                         patent sma \par \par 6/27/2022  Abd Visceral Duplex  patent celiac axis w patent but significant turbulent flow \par                         Inspiration  208 cm expiration 435 cm \par                         patent sma \par \par \par

## 2022-07-06 NOTE — REASON FOR VISIT
[FreeTextEntry1] : i still have bowel habits problems  [Home] : at home, [unfilled] , at the time of the visit. [Medical Office: ([facility])___] : at the medical office located in  [Other:____] : [unfilled] [Verbal consent obtained from patient] : the patient, [unfilled]

## 2022-07-06 NOTE — PHYSICAL EXAM
[Ankle Swelling On The Right] : of the right ankle [Ankle Swelling On The Left] : moderate [Tender] : was nontender [No Rash or Lesion] : No rash or lesion [Alert] : alert [Oriented to Person] : oriented to person [Oriented to Place] : oriented to place [Oriented to Time] : oriented to time [Calm] : calm [de-identified] : no resp distress [FreeTextEntry1] : Physical exam findings via telephonic review with patient \par \par  [de-identified] : cooperative

## 2022-07-06 NOTE — ASSESSMENT
[FreeTextEntry1] : Impression s/p median arcuate lig  release and rep of celiac artery w s/o ongoing abd motility  c/o \par \par \par Plan Med Conservative managementd/w pt that my clinical impression remains that her current sx are related to a s/o GI motility  or malabsorbrion issue which needs to be d/w and w/u by GI\par advised pt to f/u w GI to see re indication for any additional w/u for malabsorbtion and motility w/u\par pt agrees to do so \par advised pt that  by US there  appears to be some compression on the celiac artery but this may be due to  resolving inflammation and possible scar tissue\par to eval this further  I can proceed w  abd angio w inspiration and expiration  but i  recommended to not proceed w this unless the gi w/u is completed  and is otherwise negative \par telehealth eval in 6 mo jan 2023 to re eval \par rto in july 2023 w abd   visceral duplex s/o celiac artery compression then telehealth\par Telephonic visit  time duration 25 min\par \par  [Arterial/Venous Disease] : arterial/venous disease

## 2022-09-17 NOTE — PROCEDURE
[FreeTextEntry1] : ASd performed of the AAW Laparotomy Sinus tract . 
awake/alert/oriented to person, place, time/situation

## 2022-09-19 ENCOUNTER — NON-APPOINTMENT (OUTPATIENT)
Age: 52
End: 2022-09-19

## 2023-01-11 ENCOUNTER — APPOINTMENT (OUTPATIENT)
Dept: VASCULAR SURGERY | Facility: CLINIC | Age: 53
End: 2023-01-11
Payer: COMMERCIAL

## 2023-01-11 PROCEDURE — 99443: CPT

## 2023-01-11 NOTE — HISTORY OF PRESENT ILLNESS
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  following laparoscopic  median\par arcuate lig release \par pt stated her pre op sx of intensity  from 10/10 are now 0/10\par pt states able to  to op but c/o\par of  fluttering feeling and  perhaps lightheadeness after eating\par pt denies  fevers or chills \par pt also c/o of sev days of right leg swelling and shin pain \par  [de-identified] : pt states ongoing "bowel problems" where she needs to run to the bathroom often \par has improved but remains\par pt  is undergoing GI w/u and was told she has a "bile secretion" problem and is being rx w bile binding agents\par and her sx have improved \par for BM and this has limited her  social activities outside of the house \par pt wants to d/w GI completion of GI w/u for malabsorbtion

## 2023-01-11 NOTE — REASON FOR VISIT
[FreeTextEntry1] : i still have bowel habits problems  [Home] : at home, [unfilled] , at the time of the visit. [Medical Office: ([facility])___] : at the medical office located in  [Other:____] : [unfilled] [Verbal consent obtained from patient] : the patient, [unfilled]

## 2023-01-11 NOTE — ASSESSMENT
[FreeTextEntry1] : Impression s/p median arcuate lig  release and rep of celiac artery w s/o ongoing abd motility  s/o malabsorbtion syndrome \par \par \par Plan Med Conservative management\par advised pt to f/u w GI to complete  malabsorbtion and motility w/u\par pt agrees to do so \par advised pt that  by US there  appears to be some compression on the celiac artery but this may be due to  resolving inflammation and possible scar tissue\par to eval this further  I can proceed w  abd angio w inspiration and expiration  but i  recommended to not proceed w this unless the gi w/u is completed  and is otherwise negative \par pt inquired re  additional 2nd opinion rheum eval\par i advised pt to complete  gGI w/u and current  rheum w/u and  if she wants a \par 2nd opinion rheum   consult I  will recommend Dr LORY Ortega referral\par pt agrees w above plan \par telehealth eval in april 2023 to re eval \par rto in july 2023 w abd   visceral duplex s/o celiac artery compression then telehealth\par Telephonic visit  time duration 25 min\par \par  [Arterial/Venous Disease] : arterial/venous disease

## 2023-01-11 NOTE — PHYSICAL EXAM
[Ankle Swelling On The Left] : moderate [Tender] : was nontender [No Rash or Lesion] : No rash or lesion [Alert] : alert [Oriented to Person] : oriented to person [Oriented to Place] : oriented to place [Oriented to Time] : oriented to time [Calm] : calm [de-identified] : no resp distress [FreeTextEntry1] : Physical exam findings via telephonic review with patient \par \par  [de-identified] : cooperative

## 2023-02-16 NOTE — H&P PST ADULT - TOBACCO USE
Griseofulvin Pregnancy And Lactation Text: This medication is Pregnancy Category X and is known to cause serious birth defects. It is unknown if this medication is excreted in breast milk but breast feeding should be avoided. Never smoker

## 2023-06-07 ENCOUNTER — APPOINTMENT (OUTPATIENT)
Dept: RHEUMATOLOGY | Facility: CLINIC | Age: 53
End: 2023-06-07

## 2023-07-31 ENCOUNTER — APPOINTMENT (OUTPATIENT)
Dept: VASCULAR SURGERY | Facility: CLINIC | Age: 53
End: 2023-07-31
Payer: COMMERCIAL

## 2023-07-31 PROCEDURE — 93975 VASCULAR STUDY: CPT

## 2023-08-09 ENCOUNTER — APPOINTMENT (OUTPATIENT)
Dept: VASCULAR SURGERY | Facility: CLINIC | Age: 53
End: 2023-08-09
Payer: COMMERCIAL

## 2023-08-09 DIAGNOSIS — I77.4 CELIAC ARTERY COMPRESSION SYNDROME: ICD-10-CM

## 2023-08-09 PROCEDURE — 99443: CPT

## 2023-08-09 NOTE — PHYSICAL EXAM
[Ankle Swelling On The Left] : moderate [Alert] : alert [Oriented to Person] : oriented to person [Oriented to Place] : oriented to place [Oriented to Time] : oriented to time [Calm] : calm [Tender] : was nontender [de-identified] : no resp distress [FreeTextEntry1] : Physical exam findings via telephonic review with patient \par  \par   [de-identified] : cooperative

## 2023-08-09 NOTE — ASSESSMENT
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  s/o malabsorbtion syndrome    Plan Med Conservative management advised pt to f/u w GI to complete  malabsorbtion and motility w/u prn and to d/w GI this further  advised pt that  by US there  appears to be some compression on the celiac artery but this may be due to  resolving inflammation and possible scar tissue to eval this further  I can proceed w  abd angio w inspiration and expiration  but i  recommended to not proceed w this unless the gi w/u is completed  and is otherwise negative  at this time i have advised pt to hold off on any visceral angiography her current abd c/o are not c/w MALS ovw abd   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

## 2023-08-09 NOTE — HISTORY OF PRESENT ILLNESS
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  following laparoscopic  median\par  arcuate lig release \par  pt stated her pre op sx of intensity  from 10/10 are now 0/10\par  pt states able to  to op but c/o\par  of  fluttering feeling and  perhaps lightheadeness after eating\par  pt denies  fevers or chills \par  pt also c/o of sev days of right leg swelling and shin pain \par   [de-identified] : pt states ongoing "bowel problems"  but overall improved as per pt GI is not pursuing any additional w/u for malabsorbtion pt states that she is able to avelina po well  pt states that she is being also w/u s/o fibromyalgia

## 2023-08-09 NOTE — REASON FOR VISIT
[Home] : at home, [unfilled] , at the time of the visit. [Medical Office: ([facility])___] : at the medical office located in  [Other:____] : [unfilled] [Verbal consent obtained from patient] : the patient, [unfilled] [FreeTextEntry1] : i still have bowel habits problems

## 2023-08-09 NOTE — DATA REVIEWED
[FreeTextEntry1] : Outside facility report reviewed 8/31/2021  Mesenteric Duplex elevated  celiac axis  turbulent flow   9/21/2021  Abd Visceral Duplex  patent celiac axis w patent but significant turbulent flow                          Inspiration  371 cm expiration 648 cm                          patent sma   12/27/2021  Abd Visceral Duplex  patent celiac axis w patent but significant turbulent flow                          Inspiration  232 cm expiration 584 cm                          patent sma   6/27/2022  Abd Visceral Duplex  patent celiac axis w patent but significant turbulent flow                          Inspiration  208 cm expiration 435 cm                          patent sma   8/9/2023 Abd Visceral Duplex  patent celiac axis w patent but significant turbulent flow                          Inspiration  85 cm expiration 328 cm  at rest  575/193 cm                         patent sma 202/20 cm

## 2023-09-15 ENCOUNTER — APPOINTMENT (OUTPATIENT)
Dept: CARDIOLOGY | Facility: CLINIC | Age: 53
End: 2023-09-15
Payer: COMMERCIAL

## 2023-09-15 VITALS
HEART RATE: 93 BPM | BODY MASS INDEX: 42.33 KG/M2 | WEIGHT: 230 LBS | SYSTOLIC BLOOD PRESSURE: 119 MMHG | HEIGHT: 62 IN | OXYGEN SATURATION: 97 % | DIASTOLIC BLOOD PRESSURE: 83 MMHG

## 2023-09-15 VITALS
HEIGHT: 62 IN | HEART RATE: 93 BPM | OXYGEN SATURATION: 97 % | BODY MASS INDEX: 42.33 KG/M2 | WEIGHT: 230 LBS | SYSTOLIC BLOOD PRESSURE: 119 MMHG | DIASTOLIC BLOOD PRESSURE: 83 MMHG

## 2023-09-15 DIAGNOSIS — M79.7 FIBROMYALGIA: ICD-10-CM

## 2023-09-15 PROCEDURE — 99213 OFFICE O/P EST LOW 20 MIN: CPT

## 2023-09-15 PROCEDURE — 93000 ELECTROCARDIOGRAM COMPLETE: CPT

## 2023-09-15 RX ORDER — CHOLESTYRAMINE 4 G/9G
4 POWDER, FOR SUSPENSION ORAL DAILY
Refills: 0 | Status: ACTIVE | COMMUNITY

## 2023-09-15 RX ORDER — CITALOPRAM HYDROBROMIDE 20 MG/1
20 TABLET, FILM COATED ORAL
Qty: 90 | Refills: 0 | Status: DISCONTINUED | COMMUNITY
Start: 2022-07-01 | End: 2023-09-15

## 2023-09-15 RX ORDER — DOXYCYCLINE 100 MG/1
100 CAPSULE ORAL
Qty: 14 | Refills: 0 | Status: DISCONTINUED | COMMUNITY
Start: 2022-03-20 | End: 2023-09-15

## 2023-09-15 RX ORDER — FLUTICASONE FUROATE, UMECLIDINIUM BROMIDE AND VILANTEROL TRIFENATATE 200; 62.5; 25 UG/1; UG/1; UG/1
200-62.5-25 POWDER RESPIRATORY (INHALATION)
Refills: 0 | Status: ACTIVE | COMMUNITY

## 2023-09-15 RX ORDER — ESZOPICLONE 3 MG/1
3 TABLET, FILM COATED ORAL
Qty: 30 | Refills: 0 | Status: DISCONTINUED | COMMUNITY
Start: 2022-01-21 | End: 2023-09-15

## 2023-09-15 RX ORDER — GLUCOSAMINE HCL/CHONDROITIN SU 500-400 MG
3 CAPSULE ORAL
Refills: 0 | Status: DISCONTINUED | COMMUNITY
End: 2023-09-15

## 2023-09-15 RX ORDER — DULOXETINE HYDROCHLORIDE 40 MG/1
40 CAPSULE, DELAYED RELEASE PELLETS ORAL
Refills: 0 | Status: ACTIVE | COMMUNITY

## 2023-09-15 RX ORDER — LEVOFLOXACIN 500 MG/1
500 TABLET, FILM COATED ORAL
Qty: 14 | Refills: 0 | Status: DISCONTINUED | COMMUNITY
Start: 2022-04-08 | End: 2023-09-15

## 2023-09-15 RX ORDER — GABAPENTIN 300 MG/1
300 CAPSULE ORAL
Qty: 90 | Refills: 0 | Status: DISCONTINUED | COMMUNITY
Start: 2021-06-02 | End: 2023-09-15

## 2023-09-15 RX ORDER — CITALOPRAM 40 MG/1
40 TABLET, FILM COATED ORAL DAILY
Qty: 90 | Refills: 3 | Status: DISCONTINUED | COMMUNITY
End: 2023-09-15

## 2023-09-15 RX ORDER — FLUTICASONE FUROATE AND VILANTEROL TRIFENATATE 100; 25 UG/1; UG/1
100-25 POWDER RESPIRATORY (INHALATION) DAILY
Refills: 3 | Status: DISCONTINUED | COMMUNITY
End: 2023-09-15

## 2023-09-15 RX ORDER — PNV NO.95/FERROUS FUM/FOLIC AC 28MG-0.8MG
TABLET ORAL
Refills: 0 | Status: DISCONTINUED | COMMUNITY
End: 2023-09-15

## 2023-09-15 RX ORDER — OMEPRAZOLE 40 MG/1
40 CAPSULE, DELAYED RELEASE ORAL
Qty: 90 | Refills: 0 | Status: ACTIVE | COMMUNITY
Start: 2021-06-04

## 2023-09-15 RX ORDER — FAMOTIDINE 20 MG/1
20 TABLET, FILM COATED ORAL
Refills: 0 | Status: ACTIVE | COMMUNITY

## 2023-10-20 ENCOUNTER — APPOINTMENT (OUTPATIENT)
Dept: CARDIOLOGY | Facility: CLINIC | Age: 53
End: 2023-10-20
Payer: COMMERCIAL

## 2023-10-20 ENCOUNTER — NON-APPOINTMENT (OUTPATIENT)
Age: 53
End: 2023-10-20

## 2023-10-20 VITALS
HEART RATE: 86 BPM | HEIGHT: 62 IN | OXYGEN SATURATION: 98 % | SYSTOLIC BLOOD PRESSURE: 121 MMHG | WEIGHT: 235 LBS | DIASTOLIC BLOOD PRESSURE: 85 MMHG | BODY MASS INDEX: 43.24 KG/M2

## 2023-10-20 VITALS — DIASTOLIC BLOOD PRESSURE: 90 MMHG | SYSTOLIC BLOOD PRESSURE: 143 MMHG

## 2023-10-20 PROCEDURE — 99214 OFFICE O/P EST MOD 30 MIN: CPT

## 2023-10-20 PROCEDURE — 93000 ELECTROCARDIOGRAM COMPLETE: CPT

## 2023-11-03 ENCOUNTER — OUTPATIENT (OUTPATIENT)
Dept: OUTPATIENT SERVICES | Facility: HOSPITAL | Age: 53
LOS: 1 days | End: 2023-11-03
Payer: COMMERCIAL

## 2023-11-03 ENCOUNTER — APPOINTMENT (OUTPATIENT)
Dept: CV DIAGNOSITCS | Facility: HOSPITAL | Age: 53
End: 2023-11-03

## 2023-11-03 DIAGNOSIS — Z98.891 HISTORY OF UTERINE SCAR FROM PREVIOUS SURGERY: Chronic | ICD-10-CM

## 2023-11-03 DIAGNOSIS — R06.09 OTHER FORMS OF DYSPNEA: ICD-10-CM

## 2023-11-03 DIAGNOSIS — Z98.890 OTHER SPECIFIED POSTPROCEDURAL STATES: Chronic | ICD-10-CM

## 2023-11-03 PROCEDURE — 93018 CV STRESS TEST I&R ONLY: CPT

## 2023-11-03 PROCEDURE — 93350 STRESS TTE ONLY: CPT | Mod: 26

## 2023-11-03 PROCEDURE — 93016 CV STRESS TEST SUPVJ ONLY: CPT

## 2023-11-03 PROCEDURE — C8930: CPT

## 2023-11-03 PROCEDURE — 93017 CV STRESS TEST TRACING ONLY: CPT

## 2023-11-10 ENCOUNTER — APPOINTMENT (OUTPATIENT)
Dept: CARDIOLOGY | Facility: CLINIC | Age: 53
End: 2023-11-10
Payer: COMMERCIAL

## 2023-11-10 VITALS
HEART RATE: 96 BPM | WEIGHT: 235 LBS | BODY MASS INDEX: 43.24 KG/M2 | DIASTOLIC BLOOD PRESSURE: 89 MMHG | HEIGHT: 62 IN | OXYGEN SATURATION: 98 % | SYSTOLIC BLOOD PRESSURE: 156 MMHG

## 2023-11-10 VITALS — SYSTOLIC BLOOD PRESSURE: 132 MMHG | DIASTOLIC BLOOD PRESSURE: 84 MMHG

## 2023-11-10 DIAGNOSIS — R06.09 OTHER FORMS OF DYSPNEA: ICD-10-CM

## 2023-11-10 PROCEDURE — 93000 ELECTROCARDIOGRAM COMPLETE: CPT

## 2023-11-10 PROCEDURE — 99214 OFFICE O/P EST MOD 30 MIN: CPT

## 2023-11-17 ENCOUNTER — APPOINTMENT (OUTPATIENT)
Dept: CARDIOLOGY | Facility: CLINIC | Age: 53
End: 2023-11-17

## 2023-12-25 ENCOUNTER — NON-APPOINTMENT (OUTPATIENT)
Age: 53
End: 2023-12-25

## 2023-12-27 ENCOUNTER — APPOINTMENT (OUTPATIENT)
Dept: UROLOGY | Facility: CLINIC | Age: 53
End: 2023-12-27
Payer: COMMERCIAL

## 2023-12-27 VITALS
DIASTOLIC BLOOD PRESSURE: 94 MMHG | HEART RATE: 116 BPM | SYSTOLIC BLOOD PRESSURE: 146 MMHG | BODY MASS INDEX: 43.24 KG/M2 | OXYGEN SATURATION: 98 % | WEIGHT: 235 LBS | HEIGHT: 62 IN | RESPIRATION RATE: 17 BRPM | TEMPERATURE: 98.1 F

## 2023-12-27 DIAGNOSIS — Z86.69 PERSONAL HISTORY OF OTHER DISEASES OF THE NERVOUS SYSTEM AND SENSE ORGANS: ICD-10-CM

## 2023-12-27 DIAGNOSIS — Z86.59 PERSONAL HISTORY OF OTHER MENTAL AND BEHAVIORAL DISORDERS: ICD-10-CM

## 2023-12-27 PROCEDURE — 99204 OFFICE O/P NEW MOD 45 MIN: CPT

## 2023-12-28 LAB
APPEARANCE: CLEAR
BACTERIA: NEGATIVE /HPF
BILIRUBIN URINE: NEGATIVE
BLOOD URINE: NEGATIVE
CAST: 1 /LPF
COLOR: YELLOW
EPITHELIAL CELLS: 4 /HPF
GLUCOSE QUALITATIVE U: NEGATIVE MG/DL
KETONES URINE: NEGATIVE MG/DL
LEUKOCYTE ESTERASE URINE: NEGATIVE
MICROSCOPIC-UA: NORMAL
NITRITE URINE: NEGATIVE
PH URINE: 5.5
PROTEIN URINE: NEGATIVE MG/DL
RED BLOOD CELLS URINE: 2 /HPF
SPECIFIC GRAVITY URINE: 1.02
UROBILINOGEN URINE: 0.2 MG/DL
WHITE BLOOD CELLS URINE: 2 /HPF

## 2023-12-30 ENCOUNTER — APPOINTMENT (OUTPATIENT)
Dept: CT IMAGING | Facility: CLINIC | Age: 53
End: 2023-12-30
Payer: COMMERCIAL

## 2023-12-30 ENCOUNTER — OUTPATIENT (OUTPATIENT)
Dept: OUTPATIENT SERVICES | Facility: HOSPITAL | Age: 53
LOS: 1 days | End: 2023-12-30
Payer: COMMERCIAL

## 2023-12-30 DIAGNOSIS — Z98.890 OTHER SPECIFIED POSTPROCEDURAL STATES: Chronic | ICD-10-CM

## 2023-12-30 DIAGNOSIS — Z98.891 HISTORY OF UTERINE SCAR FROM PREVIOUS SURGERY: Chronic | ICD-10-CM

## 2023-12-30 DIAGNOSIS — R10.9 UNSPECIFIED ABDOMINAL PAIN: ICD-10-CM

## 2023-12-30 PROCEDURE — 74176 CT ABD & PELVIS W/O CONTRAST: CPT | Mod: 26

## 2023-12-30 PROCEDURE — 74176 CT ABD & PELVIS W/O CONTRAST: CPT

## 2024-01-04 ENCOUNTER — TRANSCRIPTION ENCOUNTER (OUTPATIENT)
Age: 54
End: 2024-01-04

## 2024-01-04 NOTE — ASSESSMENT
[FreeTextEntry1] : 53-year-old female who presents for microhematuria and a new right flank pain.    She does have a history of malabsorption so her risk for kidney stones is higher.  We will send her for a CT abdomen and pelvis stone protocol and also check her urine for blood on microscopy.  I will call her with the results from the urine study.

## 2024-01-04 NOTE — REVIEW OF SYSTEMS
[Feeling Tired] : feeling tired [Eyesight Problems] : eyesight problems [Shortness Of Breath] : shortness of breath [Wheezing] : wheezing [Abdominal Pain] : abdominal pain [Constipation] : constipation [Diarrhea] : diarrhea [Heartburn] : heartburn [Loss of interest] : loss of interest in sexual activity [Told you have blood in urine on a urine test] : told blood was present in a urine test [Wake up at night to urinate  How many times?  ___] : wakes up to urinate [unfilled] times during the night [Strong urge to urinate] : strong urge to urinate [Joint Pain] : joint pain [Itching] : itching [Difficulty Walking] : difficulty walking [Anxiety] : anxiety [Depression] : depression [Hot Flashes] : hot flashes [Muscle Weakness] : muscle weakness [Feelings Of Weakness] : feelings of weakness [Easy Bruising] : a tendency for easy bruising [Negative] : Cardiovascular [FreeTextEntry6] : frequent urination 8x

## 2024-01-04 NOTE — PHYSICAL EXAM
[Normal Appearance] : normal appearance [Well Groomed] : well groomed [General Appearance - In No Acute Distress] : no acute distress [Costovertebral Angle Tenderness] : no ~M costovertebral angle tenderness [Normal Station and Gait] : the gait and station were normal for the patient's age [] : no rash [Oriented To Time, Place, And Person] : oriented to person, place, and time [Affect] : the affect was normal [Mood] : the mood was normal [de-identified] : +tender to palpation at left flank musculature, +tender lower back paraspinals

## 2024-01-04 NOTE — REASON FOR VISIT
[TextEntry] : 53-year-old female referred for microscopic hematuria and flank pain.   1 week ago the patient developed a severe right flank pain which felt different from her other chronic pain.  She went to an urgent care on 1226 and was found to have blood in her urine.  They also sent her for a CT angio to rule out a pulmonary embolus and that was negative as well.  Since the onset of the pain she feels like it has lessened somewhat.  Her urine dipstick was positive for blood.  She denies any history of prior kidney stones.  At baseline though she does have urinary urgency, urge incontinence, urinary frequency, and nocturia 2-3 times.  Her history is complex.  She had a laparoscopic procedure in 2021 which involved the celiac artery injury.  She has a persistent stenosis.  She endorses having residual bowel issues with malabsorption.  She also was diagnosed with fibromyalgia in March 2022.  For this she takes duloxetine and Wellbutrin.   She also has a history of back pain for which she goes for injections.  She also gets injections in her bilateral knees.  She has migraines and is on hormone replacement therapy.  Her history is also significant for Graves' disease.  She has sleep apnea but does not currently use a device. She has a family history of kidney cancer in her brother

## 2024-01-24 ENCOUNTER — LABORATORY RESULT (OUTPATIENT)
Age: 54
End: 2024-01-24

## 2024-01-24 ENCOUNTER — APPOINTMENT (OUTPATIENT)
Dept: PULMONOLOGY | Facility: CLINIC | Age: 54
End: 2024-01-24
Payer: COMMERCIAL

## 2024-01-24 VITALS
OXYGEN SATURATION: 99 % | BODY MASS INDEX: 43.24 KG/M2 | HEIGHT: 61.5 IN | DIASTOLIC BLOOD PRESSURE: 86 MMHG | SYSTOLIC BLOOD PRESSURE: 130 MMHG | WEIGHT: 232 LBS | HEART RATE: 96 BPM

## 2024-01-24 DIAGNOSIS — Z14.1 CYSTIC FIBROSIS CARRIER: ICD-10-CM

## 2024-01-24 PROCEDURE — 99417 PROLNG OP E/M EACH 15 MIN: CPT

## 2024-01-24 PROCEDURE — 94726 PLETHYSMOGRAPHY LUNG VOLUMES: CPT

## 2024-01-24 PROCEDURE — 94729 DIFFUSING CAPACITY: CPT

## 2024-01-24 PROCEDURE — 36415 COLL VENOUS BLD VENIPUNCTURE: CPT

## 2024-01-24 PROCEDURE — 94060 EVALUATION OF WHEEZING: CPT

## 2024-01-24 PROCEDURE — 99205 OFFICE O/P NEW HI 60 MIN: CPT | Mod: 25

## 2024-01-24 NOTE — PHYSICAL EXAM
[No Acute Distress] : no acute distress [Normal Oropharynx] : normal oropharynx [Normal Appearance] : normal appearance [No Neck Mass] : no neck mass [Normal Rate/Rhythm] : normal rate/rhythm [No Murmurs] : no murmurs [Normal S1, S2] : normal s1, s2 [No Resp Distress] : no resp distress [Clear to Auscultation Bilaterally] : clear to auscultation bilaterally [No Abnormalities] : no abnormalities [Benign] : benign [Normal Gait] : normal gait [No Clubbing] : no clubbing [No Cyanosis] : no cyanosis [No Edema] : no edema [Normal Color/ Pigmentation] : normal color/ pigmentation [No Focal Deficits] : no focal deficits [Oriented x3] : oriented x3 [Normal Affect] : normal affect

## 2024-01-31 LAB
A FLAVUS AB FLD QL: NEGATIVE
A FUMIGATUS AB FLD QL: NEGATIVE
A FUMIGATUS IGE QN: <0.1 KUA/L
A NIGER AB FLD QL: NEGATIVE
BASOPHILS # BLD AUTO: 0.08 K/UL
BASOPHILS NFR BLD AUTO: 0.8 %
CRP SERPL-MCNC: 18 MG/L
DEPRECATED A FUMIGATUS IGE RAST QL: 0 (ref 0–?)
DEPRECATED KAPPA LC FREE/LAMBDA SER: 1.45 RATIO
EOSINOPHIL # BLD AUTO: 0.28 K/UL
EOSINOPHIL NFR BLD AUTO: 2.9 %
ERYTHROCYTE [SEDIMENTATION RATE] IN BLOOD BY WESTERGREN METHOD: 26 MM/HR
HCT VFR BLD CALC: 46.6 %
HGB BLD-MCNC: 14.3 G/DL
IGA SER QL IEP: 161 MG/DL
IGG SER QL IEP: 837 MG/DL
IGM SER QL IEP: 46 MG/DL
IMM GRANULOCYTES NFR BLD AUTO: 0.3 %
KAPPA LC CSF-MCNC: 0.78 MG/DL
KAPPA LC SERPL-MCNC: 1.13 MG/DL
LYMPHOCYTES # BLD AUTO: 2.46 K/UL
LYMPHOCYTES NFR BLD AUTO: 25.7 %
MAN DIFF?: NORMAL
MCHC RBC-ENTMCNC: 29.2 PG
MCHC RBC-ENTMCNC: 30.7 GM/DL
MCV RBC AUTO: 95.3 FL
MONOCYTES # BLD AUTO: 0.55 K/UL
MONOCYTES NFR BLD AUTO: 5.7 %
NEUTROPHILS # BLD AUTO: 6.17 K/UL
NEUTROPHILS NFR BLD AUTO: 64.6 %
PLATELET # BLD AUTO: 450 K/UL
RBC # BLD: 4.89 M/UL
RBC # FLD: 12.9 %
TOTAL IGE SMQN RAST: 63 KU/L
WBC # FLD AUTO: 9.57 K/UL

## 2024-01-31 NOTE — ASSESSMENT
[FreeTextEntry1] : ATTENDING ATTESTATION  This is a 52y/o F w/ a PMHx significant for asthma, obesity, depression, anxiety, median arcuate ligament syndrome, Graves' disease in remission, fibromyalgia, CHRIS (has dental appliance), and arthritis in b/l knees who presents today for pulmonary evaluation of asthma and "breathing attacks" that have occurred twice since November.  Of note, patient is a CF carrier- unclear which gene. Has never had sweat testing done.   CF carrier status  - Patient will request records including CF genetics and have them faxed to our office.  - Sweat test ordered. Scheduling information provided.  - Recent CXR when patient was diagnosed with kidney stones. Reportedly normal. Patient will request results be faxed over to our office.   Asthma- managed by allergist Dr. David Wertheim.  - Continue Trelegy daily, Albuterol PRN.  - PFT 1/24/24 - Mild obstructive ventilatory defect with significant bronchodilator response. Normal TLC, RV, reduced ERV. Normal DlCO.   r/o EGPA. Patient is currently off Steroids.  - Bloodwork completed in office today - CBC W/ DIFF, IgE, ABPA, ANCA w. Reflex, Immunoglobulins panel, ESR, CRP.   ENT: Chronic Sinusitis, PND, nasal polyp. Follows with Dr. Junior Castro at [facility].  - On Flonase PRN  - Currently on Levaquin (ordered by ENT, on week 2, per pt anticipated to go on another 2-week course of levaquin). Complete ABX.    ADDENDUM - pt faxed CTPA from [facility] 12/21/23- negative for PE, mosaic attenuation bilaterally. no pulmonary nodule or consodliation, no LNP.

## 2024-01-31 NOTE — REVIEW OF SYSTEMS
[Nasal Congestion] : nasal congestion [Postnasal Drip] : postnasal drip [Sinus Problems] : sinus problems [Hay Fever] : hay fever [Watery Eyes] : watery eyes [Itchy Eyes] : itchy eyes [Seasonal Allergies] : seasonal allergies [Nasal Discharge] : nasal discharge [GERD] : gerd [Chronic Pain] : chronic pain [Headache] : headache [Depression] : depression [Anxiety] : anxiety [Panic Attacks] : panic attacks [Thyroid Problem] : thyroid problem [Obesity] : obesity [Negative] : Hematologic [Sore Throat] : no sore throat [Abdominal Pain] : no abdominal pain [Nausea] : no nausea [Vomiting] : no vomiting [Diarrhea] : no diarrhea [Constipation] : no constipation [Diabetes] : no diabetes [TextBox_30] : see HPI  [TextBox_57] : allergy shots Q1mo

## 2024-01-31 NOTE — HISTORY OF PRESENT ILLNESS
[Never] : never [Obstructive Sleep Apnea] : obstructive sleep apnea [TextBox_4] : This is a 52y/o F w/ a PMHx significant for asthma, obesity, depression, anxiety, median arcuate ligament syndrome, Graves' disease in remission, fibromyalgia, CHRIS (has dental appliance), and arthritis in b/l knees who presents today for pulmonary evaluation of asthma and "breathing attacks" that have occurred twice since November.  Of note, patient is a CF carrier- unclear which gene. Has never had sweat testing done.   Patient reports first "attack" occurred in Nov 2023. She was outside for a walk  and began wheezing, and developed a sensation of her throat closing, cold feeling in her throat, coughing, and not being able to talk x 1 hour. Tried cold/hot drinks with no relief. Use albuterol x 4 with no relief. She followed up with her cardiologist and experienced a similar episode after completing a stress test in his office. Symptoms began after she left the office and was driving home. It started with a cough and progressed to similar symptoms noted above. She went to her allergists office while this was happening (he manages her asthma) and was evaluated by another doctor in the office who informed the patient that her lungs were clear and believed it was an upper airway issue. Advised ED, but he patient declined and also advised f/u with ENT. The patient followed up with her ENT a week later who told her she should follow up with a pulmonologist. She has not had any other episodes since. Patient does not cough at baseline. Denies SOB, chest tightness, wheeze, Some SOB with stairs that is self limiting.    Pulm: On Trelegy, feels like it is controlling her symptoms well. Has not required albuterol. Recent CXR last month was normal.   ENT: Hx of sinus infections, typically requires about 5 courses of antibiotics - either Levaquin or Ceftin x 2-3 weeks each course. Currently on Levaquin. Reports having 1 polyp, no surgeries. + chronic sinus infections.  +PND - she is able to expectorate phlegm daily - gray in color.   GI: currently being worked up for malabsorption.  Diarrhea x 1 year after surgery for median arcuate ligament syndrome. Started on cholestyramine, now with normal BM's.   GERD - omeprazole in AM and famotidine in evening.  Denies greasy oily stools.   No history of pancreatitis or cholecystitis.   Endo: No hx of diabetes. + Hx of Graves' disease, now in remission. Hx of vitamin D deficiency, now on supplement daily.   Rheum:  fibromyalgia, on gabapentin.   Family History:  Father CF carrier - alive, living in [place]. Previously worked up at our [facility] ([facility]  Son CF carrier - follows with Dr. Aaron.   Psych: + Anxiety and Depression. History of severe mood swings. Previously on Celexa. Now on Duloxetine and Bupropion.  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

## 2024-02-06 ENCOUNTER — APPOINTMENT (OUTPATIENT)
Dept: PEDIATRIC PULMONARY CYSTIC FIB | Facility: CLINIC | Age: 54
End: 2024-02-06

## 2024-02-07 ENCOUNTER — APPOINTMENT (OUTPATIENT)
Dept: PULMONOLOGY | Facility: CLINIC | Age: 54
End: 2024-02-07
Payer: COMMERCIAL

## 2024-02-07 ENCOUNTER — NON-APPOINTMENT (OUTPATIENT)
Age: 54
End: 2024-02-07

## 2024-02-07 PROCEDURE — 36415 COLL VENOUS BLD VENIPUNCTURE: CPT

## 2024-02-12 ENCOUNTER — NON-APPOINTMENT (OUTPATIENT)
Age: 54
End: 2024-02-12

## 2024-02-12 ENCOUNTER — APPOINTMENT (OUTPATIENT)
Dept: CARDIOLOGY | Facility: CLINIC | Age: 54
End: 2024-02-12
Payer: COMMERCIAL

## 2024-02-12 VITALS
BODY MASS INDEX: 43.24 KG/M2 | DIASTOLIC BLOOD PRESSURE: 90 MMHG | OXYGEN SATURATION: 99 % | SYSTOLIC BLOOD PRESSURE: 136 MMHG | HEART RATE: 88 BPM | HEIGHT: 61.5 IN | WEIGHT: 232 LBS

## 2024-02-12 VITALS — DIASTOLIC BLOOD PRESSURE: 84 MMHG | SYSTOLIC BLOOD PRESSURE: 123 MMHG

## 2024-02-12 PROCEDURE — 93000 ELECTROCARDIOGRAM COMPLETE: CPT

## 2024-02-12 PROCEDURE — 99213 OFFICE O/P EST LOW 20 MIN: CPT

## 2024-02-12 NOTE — HISTORY OF PRESENT ILLNESS
[FreeTextEntry1] :   Patient is a 51 year old who will require surgery for celiac artery obstruction.  Imaging reviewed.  Her surgery has not been scheduled.   Denies htn, diabetes, elevated cholesterol.  Hx of Graves disease which she reports is in remission.   No cardiac testing in the last 10 years. non smoker, no alcohol, no drug use. hx of mild asthma no functional limitations.  6/21/2021  testing reviewed.  echo with normal EF and no valvular disease.  nuclear images appear to correct with prone imaging.  and normal LV function.  surgery now scheduled for July1st...  9/15/2023  sent by PCP for elevated blood pressure which appears to have started when her duloxetine was elevated.  bp improved when dose was lowered,  Wellbutrin was added. For mood disorder.  10/20/2023  a week ago episode of severe sob, chest tightness and cold throat. After walking up hill.  sx lasted over an hour but she did not seek medical attention..  took 3 rescue inhalers without relief.  feeling better now,  11/10/2023  completed stress echo, exercize for 5:17 . pt reached max predicted heart rate.  no evidence of ischemia..  may be related to excess mucus production as per her allergist.   Cystic fibrosis carrier.  2/12/2024  CF workkup occuring,  sweat test is intermediate range and needs blood work, being evaluated by pulm,  no new cardiac sx,

## 2024-02-12 NOTE — END OF VISIT
[Time Spent: ___ minutes] : I have spent [unfilled] minutes of time on the encounter. Azelaic Acid Pregnancy And Lactation Text: This medication is considered safe during pregnancy and breast feeding.

## 2024-02-12 NOTE — DISCUSSION/SUMMARY
[Essential Hypertension] : essential hypertension [Improving] : improving [None] : There are no changes in medication management [___ Month(s)] : in [unfilled] month(s) [EKG obtained to assist in diagnosis and management of assessed problem(s)] : EKG obtained to assist in diagnosis and management of assessed problem(s) [FreeTextEntry1] : 2/12/2024  normal stress test,  bp improved.  CF w/u .  baseline blood work

## 2024-02-16 LAB
ALBUMIN SERPL ELPH-MCNC: 4.4 G/DL
ALP BLD-CCNC: 95 U/L
ALT SERPL-CCNC: 11 U/L
ANION GAP SERPL CALC-SCNC: 10 MMOL/L
AST SERPL-CCNC: 11 U/L
BILIRUB SERPL-MCNC: 0.4 MG/DL
BUN SERPL-MCNC: 10 MG/DL
CALCIUM SERPL-MCNC: 9.9 MG/DL
CHLORIDE SERPL-SCNC: 104 MMOL/L
CO2 SERPL-SCNC: 27 MMOL/L
CREAT SERPL-MCNC: 0.94 MG/DL
EGFR: 73 ML/MIN/1.73M2
ESTIMATED AVERAGE GLUCOSE: 108 MG/DL
GLUCOSE SERPL-MCNC: 110 MG/DL
HBA1C MFR BLD HPLC: 5.4 %
POTASSIUM SERPL-SCNC: 4.5 MMOL/L
PROT SERPL-MCNC: 6.7 G/DL
SODIUM SERPL-SCNC: 141 MMOL/L

## 2024-02-27 LAB
CHOLEST SERPL-MCNC: 228 MG/DL
HDLC SERPL-MCNC: 90 MG/DL
LDLC SERPL CALC-MCNC: 105 MG/DL
NONHDLC SERPL-MCNC: 138 MG/DL
TRIGL SERPL-MCNC: 195 MG/DL

## 2024-03-06 ENCOUNTER — NON-APPOINTMENT (OUTPATIENT)
Age: 54
End: 2024-03-06

## 2024-04-08 NOTE — PATIENT PROFILE ADULT - INTERNATIONAL TRAVEL
Tolerated procedure well. Denies pain at this time. Spouse assisted w/ dressing patient r/t limited mobility to right arm (Reports occurred (2/22/24). Transferred out of facility via wheelchair to spouse () without incident. Discharge instructions in hand upon departure.      No

## 2024-05-06 ENCOUNTER — TRANSCRIPTION ENCOUNTER (OUTPATIENT)
Age: 54
End: 2024-05-06

## 2024-05-08 ENCOUNTER — NON-APPOINTMENT (OUTPATIENT)
Age: 54
End: 2024-05-08

## 2024-05-22 ENCOUNTER — APPOINTMENT (OUTPATIENT)
Dept: INTERNAL MEDICINE | Facility: CLINIC | Age: 54
End: 2024-05-22
Payer: COMMERCIAL

## 2024-05-22 VITALS
WEIGHT: 235 LBS | TEMPERATURE: 98.1 F | OXYGEN SATURATION: 98 % | DIASTOLIC BLOOD PRESSURE: 80 MMHG | BODY MASS INDEX: 43.8 KG/M2 | HEIGHT: 61.5 IN | HEART RATE: 107 BPM | SYSTOLIC BLOOD PRESSURE: 141 MMHG

## 2024-05-22 VITALS
WEIGHT: 235 LBS | BODY MASS INDEX: 43.8 KG/M2 | HEIGHT: 61.5 IN | DIASTOLIC BLOOD PRESSURE: 80 MMHG | OXYGEN SATURATION: 98 % | HEART RATE: 107 BPM | SYSTOLIC BLOOD PRESSURE: 141 MMHG

## 2024-05-22 DIAGNOSIS — Z00.00 ENCOUNTER FOR GENERAL ADULT MEDICAL EXAMINATION W/OUT ABNORMAL FINDINGS: ICD-10-CM

## 2024-05-22 DIAGNOSIS — Z87.09 PERSONAL HISTORY OF OTHER DISEASES OF THE RESPIRATORY SYSTEM: ICD-10-CM

## 2024-05-22 DIAGNOSIS — E55.9 VITAMIN D DEFICIENCY, UNSPECIFIED: ICD-10-CM

## 2024-05-22 DIAGNOSIS — Z81.8 FAMILY HISTORY OF OTHER MENTAL AND BEHAVIORAL DISORDERS: ICD-10-CM

## 2024-05-22 DIAGNOSIS — Z13.1 ENCOUNTER FOR SCREENING FOR DIABETES MELLITUS: ICD-10-CM

## 2024-05-22 DIAGNOSIS — R31.29 OTHER MICROSCOPIC HEMATURIA: ICD-10-CM

## 2024-05-22 DIAGNOSIS — Z13.220 ENCOUNTER FOR SCREENING FOR LIPOID DISORDERS: ICD-10-CM

## 2024-05-22 DIAGNOSIS — M79.89 OTHER SPECIFIED SOFT TISSUE DISORDERS: ICD-10-CM

## 2024-05-22 DIAGNOSIS — Z86.39 PERSONAL HISTORY OF OTHER ENDOCRINE, NUTRITIONAL AND METABOLIC DISEASE: ICD-10-CM

## 2024-05-22 DIAGNOSIS — M79.604 PAIN IN RIGHT LEG: ICD-10-CM

## 2024-05-22 DIAGNOSIS — U07.1 COVID-19: ICD-10-CM

## 2024-05-22 DIAGNOSIS — E05.90 THYROTOXICOSIS, UNSPECIFIED W/OUT THYROTOXIC CRISIS OR STORM: ICD-10-CM

## 2024-05-22 DIAGNOSIS — Z13.29 ENCOUNTER FOR SCREENING FOR OTHER SUSPECTED ENDOCRINE DISORDER: ICD-10-CM

## 2024-05-22 DIAGNOSIS — M72.2 PLANTAR FASCIAL FIBROMATOSIS: ICD-10-CM

## 2024-05-22 DIAGNOSIS — R10.9 UNSPECIFIED ABDOMINAL PAIN: ICD-10-CM

## 2024-05-22 DIAGNOSIS — Z87.898 PERSONAL HISTORY OF OTHER SPECIFIED CONDITIONS: ICD-10-CM

## 2024-05-22 DIAGNOSIS — R10.84 GENERALIZED ABDOMINAL PAIN: ICD-10-CM

## 2024-05-22 DIAGNOSIS — Z13.0 ENCOUNTER FOR SCREENING FOR DISEASES OF THE BLOOD AND BLOOD-FORMING ORGANS AND CERTAIN DISORDERS INVOLVING THE IMMUNE MECHANISM: ICD-10-CM

## 2024-05-22 PROCEDURE — 99386 PREV VISIT NEW AGE 40-64: CPT

## 2024-05-22 RX ORDER — BUPROPION HYDROCHLORIDE 75 MG/1
75 TABLET, FILM COATED ORAL DAILY
Refills: 0 | Status: ACTIVE | COMMUNITY

## 2024-05-22 RX ORDER — GABAPENTIN 600 MG/1
600 TABLET, COATED ORAL
Qty: 90 | Refills: 0 | Status: ACTIVE | COMMUNITY

## 2024-05-22 NOTE — HISTORY OF PRESENT ILLNESS
[FreeTextEntry1] : Annual physical [de-identified] : 53 y/o female cystic fibrosis carrier with h/o fibromyalgia, mood disorder, h/o Graves disease, sleep apnea who presents for follow up. Recently hospitalized for elevated BP. WIll be getting an EEG. 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  Sees pain management GI for diarrhea pap 2023 mammo 2023 Colonoscopy 2021

## 2024-05-22 NOTE — PLAN
[FreeTextEntry1] : Fibromyalgia Follow-up with rheumatology.  Continue duloxetine  Mood disorder Continue bupropion and duloxetine  Diarrhea Continue cholestyramine  Asthma Continue Trelegy  Chronic pain Follow-up with pain management  Health maintenance Depression screen negative Blood pressure is a little elevated today.  Will monitor She is not fasting today.  She will go to the lab to have her blood drawn. CBC, CMP, TSH, HbA1c, lipids.  Pap smear, mammogram and colonoscopy up-to-date

## 2024-05-22 NOTE — HEALTH RISK ASSESSMENT
[No falls in past year] : Patient reported no falls in the past year [With Family] : lives with family [Retired] : retired [] :  [# Of Children ___] : has [unfilled] children [Never] : Never [0] : 2) Feeling down, depressed, or hopeless: Not at all (0) [PHQ-2 Negative - No further assessment needed] : PHQ-2 Negative - No further assessment needed [de-identified] : rare [YHB8Dhabx] : 0 [Reports changes in hearing] : Reports no changes in hearing [Reports changes in vision] : Reports no changes in vision [Reports changes in dental health] : Reports no changes in dental health

## 2024-05-22 NOTE — PHYSICAL EXAM
[No Acute Distress] : no acute distress [Well-Appearing] : well-appearing [Normal Sclera/Conjunctiva] : normal sclera/conjunctiva [PERRL] : pupils equal round and reactive to light [EOMI] : extraocular movements intact [Normal Outer Ear/Nose] : the outer ears and nose were normal in appearance [Normal Oropharynx] : the oropharynx was normal [Normal TMs] : both tympanic membranes were normal [No JVD] : no jugular venous distention [No Lymphadenopathy] : no lymphadenopathy [Supple] : supple [Thyroid Normal, No Nodules] : the thyroid was normal and there were no nodules present [No Respiratory Distress] : no respiratory distress  [No Accessory Muscle Use] : no accessory muscle use [Clear to Auscultation] : lungs were clear to auscultation bilaterally [Normal Rate] : normal rate  [Regular Rhythm] : with a regular rhythm [Normal S1, S2] : normal S1 and S2 [No Murmur] : no murmur heard [No Carotid Bruits] : no carotid bruits [No Varicosities] : no varicosities [Pedal Pulses Present] : the pedal pulses are present [No Edema] : there was no peripheral edema [No Extremity Clubbing/Cyanosis] : no extremity clubbing/cyanosis [Normal Appearance] : normal in appearance [No Nipple Discharge] : no nipple discharge [No Axillary Lymphadenopathy] : no axillary lymphadenopathy [Soft] : abdomen soft [Non Tender] : non-tender [Non-distended] : non-distended [No Masses] : no abdominal mass palpated [No HSM] : no HSM [Normal Bowel Sounds] : normal bowel sounds [Normal Posterior Cervical Nodes] : no posterior cervical lymphadenopathy [Normal Anterior Cervical Nodes] : no anterior cervical lymphadenopathy [No CVA Tenderness] : no CVA  tenderness [No Spinal Tenderness] : no spinal tenderness [No Joint Swelling] : no joint swelling [Grossly Normal Strength/Tone] : grossly normal strength/tone [No Rash] : no rash [Coordination Grossly Intact] : coordination grossly intact [No Focal Deficits] : no focal deficits [Normal Gait] : normal gait [Deep Tendon Reflexes (DTR)] : deep tendon reflexes were 2+ and symmetric [Normal Affect] : the affect was normal [Alert and Oriented x3] : oriented to person, place, and time [Normal Insight/Judgement] : insight and judgment were intact [de-identified] : overweight

## 2024-05-24 ENCOUNTER — NON-APPOINTMENT (OUTPATIENT)
Age: 54
End: 2024-05-24

## 2024-05-24 ENCOUNTER — APPOINTMENT (OUTPATIENT)
Dept: CARDIOLOGY | Facility: CLINIC | Age: 54
End: 2024-05-24
Payer: COMMERCIAL

## 2024-05-24 VITALS
OXYGEN SATURATION: 98 % | DIASTOLIC BLOOD PRESSURE: 83 MMHG | BODY MASS INDEX: 42.87 KG/M2 | SYSTOLIC BLOOD PRESSURE: 133 MMHG | HEIGHT: 61.5 IN | WEIGHT: 230 LBS | HEART RATE: 88 BPM

## 2024-05-24 PROCEDURE — 99214 OFFICE O/P EST MOD 30 MIN: CPT | Mod: 25

## 2024-05-24 PROCEDURE — 93000 ELECTROCARDIOGRAM COMPLETE: CPT

## 2024-05-24 NOTE — DISCUSSION/SUMMARY
[Non-specific ECG Changes] : abnormal ECG [Hyperlipidemia] : hyperlipidemia [Essential Hypertension] : essential hypertension [Stable] : stable [___ Week(s)] : in [unfilled] week(s) [FreeTextEntry1] : Patient is a 51 year old who will require surgery for celiac artery obstruction. Imaging reviewed. Her surgery has not been scheduled. Denies htn, diabetes, elevated cholesterol. Hx of Graves disease which she reports is in remission. No cardiac testing in the last 10 years. non smoker, no alcohol, no drug use. hx of mild asthma no functional limitations.  6/21/2021 testing reviewed. echo with normal EF and no valvular disease. nuclear images appear to correct with prone imaging. and normal LV function. surgery now scheduled for July1st...  9/15/2023 sent by PCP for elevated blood pressure which appears to have started when her duloxetine was elevated. bp improved when dose was lowered, Wellbutrin was added. For mood disorder.  10/20/2023 a week ago episode of severe sob, chest tightness and cold throat. After walking up hill. sx lasted over an hour but she did not seek medical attention.. took 3 rescue inhalers without relief. feeling better now,  11/10/2023 completed stress echo, exercize for 5:17 . pt reached max predicted heart rate. no evidence of ischemia.. may be related to excess mucus production as per her allergist. Cystic fibrosis carrier.  2/12/2024 CF workkup occuring, sweat test is intermediate range and needs blood work, being evaluated by pulm, no new cardiac sx,  5/24/2024  recent elevated BP event folliwing an argument on the phone.  concern for TIA at [facility].   she is concered because of ECG read at [facility].,  will follow up with echo. [EKG obtained to assist in diagnosis and management of assessed problem(s)] : EKG obtained to assist in diagnosis and management of assessed problem(s)

## 2024-05-24 NOTE — HISTORY OF PRESENT ILLNESS
[FreeTextEntry1] :   Patient is a 51 year old who will require surgery for celiac artery obstruction.  Imaging reviewed.  Her surgery has not been scheduled.   Denies htn, diabetes, elevated cholesterol.  Hx of Graves disease which she reports is in remission.   No cardiac testing in the last 10 years. non smoker, no alcohol, no drug use. hx of mild asthma no functional limitations.  6/21/2021  testing reviewed.  echo with normal EF and no valvular disease.  nuclear images appear to correct with prone imaging.  and normal LV function.  surgery now scheduled for July1st...  9/15/2023  sent by PCP for elevated blood pressure which appears to have started when her duloxetine was elevated.  bp improved when dose was lowered,  Wellbutrin was added. For mood disorder.  10/20/2023  a week ago episode of severe sob, chest tightness and cold throat. After walking up hill.  sx lasted over an hour but she did not seek medical attention..  took 3 rescue inhalers without relief.  feeling better now,  11/10/2023  completed stress echo, exercise for 5:17 . pt reached max predicted heart rate.  no evidence of ischemia..  may be related to excess mucus production as per her allergist.   Cystic fibrosis carrier.  2/12/2024  CF workup occurring,  sweat test is intermediate range and needs blood work, being evaluated by pulm,  no new cardiac sx,  5/24/2024  had an argument on phone about son leading to very high BP when she went to ED.  has admitted with a ?TIA but all work up was negative and was told diagnosis was transglobal amnesia. she went to neurologist and getting CT and EEG. She was concerned because ECG at [facility] was read as abnormal. no cp, sob,.  they did not do any cardiac testing. she feels her sensorium is foggy.

## 2024-06-27 ENCOUNTER — OUTPATIENT (OUTPATIENT)
Dept: OUTPATIENT SERVICES | Facility: HOSPITAL | Age: 54
LOS: 1 days | End: 2024-06-27
Payer: COMMERCIAL

## 2024-06-27 ENCOUNTER — RESULT REVIEW (OUTPATIENT)
Age: 54
End: 2024-06-27

## 2024-06-27 ENCOUNTER — APPOINTMENT (OUTPATIENT)
Dept: CV DIAGNOSITCS | Facility: HOSPITAL | Age: 54
End: 2024-06-27

## 2024-06-27 DIAGNOSIS — R94.31 ABNORMAL ELECTROCARDIOGRAM [ECG] [EKG]: ICD-10-CM

## 2024-06-27 DIAGNOSIS — Z98.890 OTHER SPECIFIED POSTPROCEDURAL STATES: Chronic | ICD-10-CM

## 2024-06-27 DIAGNOSIS — Z98.891 HISTORY OF UTERINE SCAR FROM PREVIOUS SURGERY: Chronic | ICD-10-CM

## 2024-06-27 PROCEDURE — 76376 3D RENDER W/INTRP POSTPROCES: CPT

## 2024-06-27 PROCEDURE — 76376 3D RENDER W/INTRP POSTPROCES: CPT | Mod: 26

## 2024-06-27 PROCEDURE — 93306 TTE W/DOPPLER COMPLETE: CPT

## 2024-06-27 PROCEDURE — 93306 TTE W/DOPPLER COMPLETE: CPT | Mod: 26

## 2024-07-01 ENCOUNTER — APPOINTMENT (OUTPATIENT)
Dept: CARDIOLOGY | Facility: CLINIC | Age: 54
End: 2024-07-01
Payer: COMMERCIAL

## 2024-07-01 VITALS
OXYGEN SATURATION: 98 % | HEART RATE: 85 BPM | BODY MASS INDEX: 42.87 KG/M2 | WEIGHT: 230 LBS | SYSTOLIC BLOOD PRESSURE: 127 MMHG | DIASTOLIC BLOOD PRESSURE: 85 MMHG | HEIGHT: 61.5 IN

## 2024-07-01 VITALS — SYSTOLIC BLOOD PRESSURE: 124 MMHG | DIASTOLIC BLOOD PRESSURE: 85 MMHG

## 2024-07-01 DIAGNOSIS — I10 ESSENTIAL (PRIMARY) HYPERTENSION: ICD-10-CM

## 2024-07-01 DIAGNOSIS — E66.9 OBESITY, UNSPECIFIED: ICD-10-CM

## 2024-07-01 DIAGNOSIS — R94.31 ABNORMAL ELECTROCARDIOGRAM [ECG] [EKG]: ICD-10-CM

## 2024-07-01 LAB
ALBUMIN SERPL ELPH-MCNC: 4.4 G/DL
ALP BLD-CCNC: 103 U/L
ALT SERPL-CCNC: 12 U/L
ANION GAP SERPL CALC-SCNC: 14 MMOL/L
AST SERPL-CCNC: 12 U/L
BILIRUB SERPL-MCNC: 0.4 MG/DL
BUN SERPL-MCNC: 9 MG/DL
CALCIUM SERPL-MCNC: 9.7 MG/DL
CHLORIDE SERPL-SCNC: 102 MMOL/L
CHOLEST SERPL-MCNC: 248 MG/DL
CO2 SERPL-SCNC: 24 MMOL/L
CREAT SERPL-MCNC: 0.84 MG/DL
EGFR: 83 ML/MIN/1.73M2
GLUCOSE SERPL-MCNC: 106 MG/DL
HDLC SERPL-MCNC: 80 MG/DL
LDLC SERPL CALC-MCNC: 140 MG/DL
NONHDLC SERPL-MCNC: 168 MG/DL
POTASSIUM SERPL-SCNC: 4.7 MMOL/L
PROT SERPL-MCNC: 6.7 G/DL
SODIUM SERPL-SCNC: 140 MMOL/L
TRIGL SERPL-MCNC: 161 MG/DL

## 2024-07-01 PROCEDURE — 99214 OFFICE O/P EST MOD 30 MIN: CPT | Mod: 25

## 2024-07-01 PROCEDURE — 93000 ELECTROCARDIOGRAM COMPLETE: CPT

## 2024-07-02 LAB
ESTIMATED AVERAGE GLUCOSE: 108 MG/DL
HBA1C MFR BLD HPLC: 5.4 %

## 2024-08-16 ENCOUNTER — APPOINTMENT (OUTPATIENT)
Dept: CARDIOLOGY | Facility: CLINIC | Age: 54
End: 2024-08-16
Payer: COMMERCIAL

## 2024-08-16 ENCOUNTER — NON-APPOINTMENT (OUTPATIENT)
Age: 54
End: 2024-08-16

## 2024-08-16 VITALS
OXYGEN SATURATION: 98 % | HEIGHT: 61.5 IN | WEIGHT: 220 LBS | BODY MASS INDEX: 41.01 KG/M2 | HEART RATE: 85 BPM | SYSTOLIC BLOOD PRESSURE: 120 MMHG | DIASTOLIC BLOOD PRESSURE: 77 MMHG

## 2024-08-16 DIAGNOSIS — E78.00 PURE HYPERCHOLESTEROLEMIA, UNSPECIFIED: ICD-10-CM

## 2024-08-16 PROCEDURE — 93000 ELECTROCARDIOGRAM COMPLETE: CPT

## 2024-08-16 PROCEDURE — 99213 OFFICE O/P EST LOW 20 MIN: CPT | Mod: 25

## 2024-08-16 NOTE — DISCUSSION/SUMMARY
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 Imaging reviewed. Her surgery has not been scheduled. Denies htn, diabetes, elevated cholesterol. Hx of Graves disease which she reports is in remission. No cardiac testing in the last 10 years. non smoker, no alcohol, no drug use. hx of mild asthma no functional limitations.  6/21/2021 testing reviewed. echo with normal EF and no valvular disease. nuclear images appear to correct with prone imaging. and normal LV function. surgery now scheduled for July1st...  9/15/2023 sent by PCP for elevated blood pressure which appears to have started when her duloxetine was elevated. bp improved when dose was lowered, Wellbutrin was added. For mood disorder.  10/20/2023 a week ago episode of severe sob, chest tightness and cold throat. After walking up hill. sx lasted over an hour but she did not seek medical attention.. took 3 rescue inhalers without relief. feeling better now,  11/10/2023 completed stress echo, exercise for 5:17 . pt reached max predicted heart rate. no evidence of ischemia.. may be related to excess mucus production as per her allergist. Cystic fibrosis carrier.  2/12/2024 CF workup occurring, sweat test is intermediate range and needs blood work, being evaluated by pulm, no new cardiac sx,  5/24/2024 had an argument on phone about son leading to very high BP when she went to ED. has admitted with a ?TIA but all work up was negative and was told diagnosis was transglobal amnesia. she went to neurologist and getting CT and EEG. She was concerned because ECG at [facility] was read as abnormal. no cp, sob,. they did not do any cardiac testing. she feels her sensorium is foggy.  7/1/2024  no CV sx.. echo completely normal.  recheck cholesterol panel/ blood work...  8/16/2024  BP better.. no sx.. cholesterol unchanged... 10 yr. risj 1.3%...refer to nutritionist

## 2024-08-16 NOTE — HISTORY OF PRESENT ILLNESS
[FreeTextEntry1] :   Patient is a 51 year old who will require surgery for celiac artery obstruction.  Imaging reviewed.  Her surgery has not been scheduled.   Denies htn, diabetes, elevated cholesterol.  Hx of Graves disease which she reports is in remission.   No cardiac testing in the last 10 years. non smoker, no alcohol, no drug use. hx of mild asthma no functional limitations.  6/21/2021  testing reviewed.  echo with normal EF and no valvular disease.  nuclear images appear to correct with prone imaging.  and normal LV function.  surgery now scheduled for July1st...  9/15/2023  sent by PCP for elevated blood pressure which appears to have started when her duloxetine was elevated.  bp improved when dose was lowered,  Wellbutrin was added. For mood disorder.  10/20/2023  a week ago episode of severe sob, chest tightness and cold throat. After walking up hill.  sx lasted over an hour but she did not seek medical attention..  took 3 rescue inhalers without relief.  feeling better now,  11/10/2023  completed stress echo, exercise for 5:17 . pt reached max predicted heart rate.  no evidence of ischemia..  may be related to excess mucus production as per her allergist.   Cystic fibrosis carrier.  2/12/2024  CF workup occurring,  sweat test is intermediate range and needs blood work, being evaluated by pulm,  no new cardiac sx,  5/24/2024  had an argument on phone about son leading to very high BP when she went to ED.  has admitted with a ?TIA but all work up was negative and was told diagnosis was transglobal amnesia. she went to neurologist and getting CT and EEG. She was concerned because ECG at [facility] was read as abnormal. no cp, sob,.  they did not do any cardiac testing. she feels her sensorium is foggy.  7/1/2024  no new events.  still with some memory issues.  Echo  of 6/27/2024  remains  completely normal.  there have been no CV sx,  not exercising or walking at all.  8/16/2024  no new events.. breathing is better.  bp seems to be better., still some memory issuesl

## 2025-01-18 NOTE — ED ADULT NURSE NOTE - NSFALLRSKUNASSIST_ED_ALL_ED
Called Dr. Chappell updated that patient is complaining of contraction pain 5/10 and vaginal pressure. Verbal orders for type and cross due to hemoglobin 8.3   no

## 2025-04-16 ENCOUNTER — APPOINTMENT (OUTPATIENT)
Dept: INTERNAL MEDICINE | Facility: CLINIC | Age: 55
End: 2025-04-16

## 2025-04-21 ENCOUNTER — NON-APPOINTMENT (OUTPATIENT)
Age: 55
End: 2025-04-21

## 2025-04-21 ENCOUNTER — APPOINTMENT (OUTPATIENT)
Dept: CARDIOLOGY | Facility: CLINIC | Age: 55
End: 2025-04-21
Payer: COMMERCIAL

## 2025-04-21 VITALS — DIASTOLIC BLOOD PRESSURE: 82 MMHG | SYSTOLIC BLOOD PRESSURE: 132 MMHG

## 2025-04-21 VITALS — DIASTOLIC BLOOD PRESSURE: 84 MMHG | SYSTOLIC BLOOD PRESSURE: 130 MMHG

## 2025-04-21 VITALS
HEART RATE: 80 BPM | WEIGHT: 218 LBS | DIASTOLIC BLOOD PRESSURE: 87 MMHG | OXYGEN SATURATION: 97 % | HEIGHT: 61.5 IN | SYSTOLIC BLOOD PRESSURE: 131 MMHG | BODY MASS INDEX: 40.63 KG/M2

## 2025-04-21 DIAGNOSIS — G47.30 SLEEP APNEA, UNSPECIFIED: ICD-10-CM

## 2025-04-21 DIAGNOSIS — R94.31 ABNORMAL ELECTROCARDIOGRAM [ECG] [EKG]: ICD-10-CM

## 2025-04-21 PROCEDURE — 93000 ELECTROCARDIOGRAM COMPLETE: CPT

## 2025-04-21 PROCEDURE — 99214 OFFICE O/P EST MOD 30 MIN: CPT | Mod: 25

## 2025-04-30 ENCOUNTER — OUTPATIENT (OUTPATIENT)
Dept: OUTPATIENT SERVICES | Facility: HOSPITAL | Age: 55
LOS: 1 days | End: 2025-04-30
Payer: COMMERCIAL

## 2025-04-30 VITALS
DIASTOLIC BLOOD PRESSURE: 84 MMHG | HEIGHT: 62.5 IN | OXYGEN SATURATION: 97 % | TEMPERATURE: 98 F | SYSTOLIC BLOOD PRESSURE: 152 MMHG | WEIGHT: 220.02 LBS

## 2025-04-30 DIAGNOSIS — Z98.890 OTHER SPECIFIED POSTPROCEDURAL STATES: Chronic | ICD-10-CM

## 2025-04-30 DIAGNOSIS — Z98.891 HISTORY OF UTERINE SCAR FROM PREVIOUS SURGERY: Chronic | ICD-10-CM

## 2025-04-30 DIAGNOSIS — M72.2 PLANTAR FASCIAL FIBROMATOSIS: ICD-10-CM

## 2025-04-30 DIAGNOSIS — Z01.818 ENCOUNTER FOR OTHER PREPROCEDURAL EXAMINATION: ICD-10-CM

## 2025-04-30 DIAGNOSIS — G47.33 OBSTRUCTIVE SLEEP APNEA (ADULT) (PEDIATRIC): ICD-10-CM

## 2025-04-30 LAB
ANION GAP SERPL CALC-SCNC: 6 MMOL/L — SIGNIFICANT CHANGE UP (ref 5–17)
BUN SERPL-MCNC: 12 MG/DL — SIGNIFICANT CHANGE UP (ref 7–23)
CALCIUM SERPL-MCNC: 9.1 MG/DL — SIGNIFICANT CHANGE UP (ref 8.5–10.1)
CHLORIDE SERPL-SCNC: 104 MMOL/L — SIGNIFICANT CHANGE UP (ref 96–108)
CO2 SERPL-SCNC: 29 MMOL/L — SIGNIFICANT CHANGE UP (ref 22–31)
CREAT SERPL-MCNC: 0.87 MG/DL — SIGNIFICANT CHANGE UP (ref 0.5–1.3)
EGFR: 79 ML/MIN/1.73M2 — SIGNIFICANT CHANGE UP
EGFR: 79 ML/MIN/1.73M2 — SIGNIFICANT CHANGE UP
GLUCOSE SERPL-MCNC: 87 MG/DL — SIGNIFICANT CHANGE UP (ref 70–99)
HCT VFR BLD CALC: 42.1 % — SIGNIFICANT CHANGE UP (ref 34.5–45)
HGB BLD-MCNC: 13.9 G/DL — SIGNIFICANT CHANGE UP (ref 11.5–15.5)
MCHC RBC-ENTMCNC: 30.7 PG — SIGNIFICANT CHANGE UP (ref 27–34)
MCHC RBC-ENTMCNC: 33 G/DL — SIGNIFICANT CHANGE UP (ref 32–36)
MCV RBC AUTO: 92.9 FL — SIGNIFICANT CHANGE UP (ref 80–100)
NRBC BLD AUTO-RTO: 0 /100 WBCS — SIGNIFICANT CHANGE UP (ref 0–0)
PLATELET # BLD AUTO: 407 K/UL — HIGH (ref 150–400)
POTASSIUM SERPL-MCNC: 3.7 MMOL/L — SIGNIFICANT CHANGE UP (ref 3.5–5.3)
POTASSIUM SERPL-SCNC: 3.7 MMOL/L — SIGNIFICANT CHANGE UP (ref 3.5–5.3)
RBC # BLD: 4.53 M/UL — SIGNIFICANT CHANGE UP (ref 3.8–5.2)
RBC # FLD: 12.6 % — SIGNIFICANT CHANGE UP (ref 10.3–14.5)
SODIUM SERPL-SCNC: 139 MMOL/L — SIGNIFICANT CHANGE UP (ref 135–145)
WBC # BLD: 8.9 K/UL — SIGNIFICANT CHANGE UP (ref 3.8–10.5)
WBC # FLD AUTO: 8.9 K/UL — SIGNIFICANT CHANGE UP (ref 3.8–10.5)

## 2025-04-30 PROCEDURE — 85027 COMPLETE CBC AUTOMATED: CPT

## 2025-04-30 PROCEDURE — 80048 BASIC METABOLIC PNL TOTAL CA: CPT

## 2025-04-30 PROCEDURE — 36415 COLL VENOUS BLD VENIPUNCTURE: CPT

## 2025-04-30 PROCEDURE — G0463: CPT

## 2025-04-30 NOTE — H&P PST ADULT - NSICDXPASTSURGICALHX_GEN_ALL_CORE_FT
PAST SURGICAL HISTORY:  H/O abdominal surgery Repaired median arcuate ligament syndrome in     H/O colonoscopy     H/O endoscopy May 2021    H/O:  section x 2

## 2025-04-30 NOTE — H&P PST ADULT - ATTENDING COMMENTS
PST Np  Pt sen in OR holding room. VSS, denies any changes in health status  /80, P 86/mt, o2 sat 100 %, resp 16 /mt  NPO since 9 pm on 5/8/25  JULIO Muñoz

## 2025-04-30 NOTE — H&P PST ADULT - PROBLEM SELECTOR PLAN 2
Labs - CBC, BMP and EKG  MC with PCP on 5/1. Seen and cleared cardiology (on chart)   Pre op and Hibiclens instructions reviewed and given. Take routine am meds, day of surgery with sip of water. ry. Instructed to hold and/or avoid other NSAIDs and OTC supplements. Tylenol is ok. Verbalized understanding

## 2025-04-30 NOTE — H&P PST ADULT - HISTORY OF PRESENT ILLNESS
54 yo female with CHRIS, Anxiety, Depression, Back pain and Fibromyalgia,  presents to PST with long H/O left foot plantar fascitis. Has been getting shots and shockwave therapy with short term relief. Reports that pain would come back, when it would wear off. S/P recent injection, denies foot pain today. Now scheduled for a left foot plantar fasciectomy on 5/9 with Dr. Wilson.

## 2025-04-30 NOTE — H&P PST ADULT - NSICDXPASTMEDICALHX_GEN_ALL_CORE_FT
PAST MEDICAL HISTORY:  Arthritis     Asthma well controlled, denies hospitalizations/intubations    Cystic fibrosis carrier     Fibromyalgia     Frequent sinus infections     Graves disease tx'd with Methamizole- completed several years ago, now Euthyroid    H/O abdominal pain     H/O abnormal weight loss over past 2 months lost 40lbs since she cannot tolerate solid food    H/O carpal tunnel syndrome     H/O neuropathy controlled on Gabapentin    History of 2019 novel coronavirus disease (COVID-19) January 2021- did not require hospitalization/oxygen    CHRIS (obstructive sleep apnea) rx'd dental apparatus    Seasonal allergies

## 2025-05-01 ENCOUNTER — APPOINTMENT (OUTPATIENT)
Dept: INTERNAL MEDICINE | Facility: CLINIC | Age: 55
End: 2025-05-01
Payer: COMMERCIAL

## 2025-05-01 VITALS
DIASTOLIC BLOOD PRESSURE: 88 MMHG | TEMPERATURE: 98.6 F | SYSTOLIC BLOOD PRESSURE: 125 MMHG | HEIGHT: 61.5 IN | BODY MASS INDEX: 40.45 KG/M2 | HEART RATE: 85 BPM | OXYGEN SATURATION: 96 % | RESPIRATION RATE: 17 BRPM | WEIGHT: 217 LBS

## 2025-05-01 DIAGNOSIS — Z00.00 ENCOUNTER FOR GENERAL ADULT MEDICAL EXAMINATION W/OUT ABNORMAL FINDINGS: ICD-10-CM

## 2025-05-01 DIAGNOSIS — F39 UNSPECIFIED MOOD [AFFECTIVE] DISORDER: ICD-10-CM

## 2025-05-01 DIAGNOSIS — M79.7 FIBROMYALGIA: ICD-10-CM

## 2025-05-01 PROBLEM — M72.2 PLANTAR FASCIAL FIBROMATOSIS: Chronic | Status: ACTIVE | Noted: 2025-04-30

## 2025-05-01 PROCEDURE — 99396 PREV VISIT EST AGE 40-64: CPT

## 2025-05-05 LAB
25(OH)D3 SERPL-MCNC: 14.9 NG/ML
ALBUMIN SERPL ELPH-MCNC: 4.3 G/DL
ALP BLD-CCNC: 95 U/L
ALT SERPL-CCNC: 14 U/L
ANION GAP SERPL CALC-SCNC: 15 MMOL/L
APPEARANCE: CLEAR
AST SERPL-CCNC: 15 U/L
BILIRUB SERPL-MCNC: 0.4 MG/DL
BILIRUBIN URINE: NEGATIVE
BLOOD URINE: NEGATIVE
BUN SERPL-MCNC: 12 MG/DL
CALCIUM SERPL-MCNC: 9.5 MG/DL
CHLORIDE SERPL-SCNC: 102 MMOL/L
CHOLEST SERPL-MCNC: 251 MG/DL
CO2 SERPL-SCNC: 21 MMOL/L
COLOR: YELLOW
CREAT SERPL-MCNC: 0.87 MG/DL
EGFRCR SERPLBLD CKD-EPI 2021: 79 ML/MIN/1.73M2
ESTIMATED AVERAGE GLUCOSE: 111 MG/DL
GLUCOSE QUALITATIVE U: NEGATIVE MG/DL
GLUCOSE SERPL-MCNC: 105 MG/DL
HBA1C MFR BLD HPLC: 5.5 %
HDLC SERPL-MCNC: 86 MG/DL
KETONES URINE: NEGATIVE MG/DL
LDLC SERPL-MCNC: 133 MG/DL
LEUKOCYTE ESTERASE URINE: NEGATIVE
NITRITE URINE: NEGATIVE
NONHDLC SERPL-MCNC: 166 MG/DL
PH URINE: 5.5
POTASSIUM SERPL-SCNC: 4.1 MMOL/L
PROT SERPL-MCNC: 6.8 G/DL
PROTEIN URINE: NORMAL MG/DL
SODIUM SERPL-SCNC: 138 MMOL/L
SPECIFIC GRAVITY URINE: 1.03
TRIGL SERPL-MCNC: 185 MG/DL
TSH SERPL-ACNC: 3.49 UIU/ML
UROBILINOGEN URINE: 0.2 MG/DL

## 2025-05-06 ENCOUNTER — APPOINTMENT (OUTPATIENT)
Dept: INTERNAL MEDICINE | Facility: CLINIC | Age: 55
End: 2025-05-06
Payer: COMMERCIAL

## 2025-05-06 VITALS
TEMPERATURE: 98.2 F | OXYGEN SATURATION: 99 % | WEIGHT: 220 LBS | HEART RATE: 92 BPM | HEIGHT: 61.5 IN | BODY MASS INDEX: 41.01 KG/M2 | SYSTOLIC BLOOD PRESSURE: 118 MMHG | RESPIRATION RATE: 17 BRPM | DIASTOLIC BLOOD PRESSURE: 83 MMHG

## 2025-05-06 DIAGNOSIS — Z86.59 PERSONAL HISTORY OF OTHER MENTAL AND BEHAVIORAL DISORDERS: ICD-10-CM

## 2025-05-06 DIAGNOSIS — E78.5 HYPERLIPIDEMIA, UNSPECIFIED: ICD-10-CM

## 2025-05-06 DIAGNOSIS — E55.9 VITAMIN D DEFICIENCY, UNSPECIFIED: ICD-10-CM

## 2025-05-06 DIAGNOSIS — I10 ESSENTIAL (PRIMARY) HYPERTENSION: ICD-10-CM

## 2025-05-06 DIAGNOSIS — G47.30 SLEEP APNEA, UNSPECIFIED: ICD-10-CM

## 2025-05-06 DIAGNOSIS — M72.2 PLANTAR FASCIAL FIBROMATOSIS: ICD-10-CM

## 2025-05-06 DIAGNOSIS — E66.9 OBESITY, UNSPECIFIED: ICD-10-CM

## 2025-05-06 DIAGNOSIS — Z86.69 PERSONAL HISTORY OF OTHER DISEASES OF THE NERVOUS SYSTEM AND SENSE ORGANS: ICD-10-CM

## 2025-05-06 DIAGNOSIS — Z01.818 ENCOUNTER FOR OTHER PREPROCEDURAL EXAMINATION: ICD-10-CM

## 2025-05-06 PROCEDURE — G2211 COMPLEX E/M VISIT ADD ON: CPT | Mod: NC

## 2025-05-06 PROCEDURE — 99214 OFFICE O/P EST MOD 30 MIN: CPT

## 2025-05-06 RX ORDER — ERGOCALCIFEROL 1.25 MG/1
1.25 MG CAPSULE ORAL
Qty: 12 | Refills: 0 | Status: ACTIVE | COMMUNITY
Start: 2025-05-06 | End: 1900-01-01

## 2025-05-08 NOTE — ASU PATIENT PROFILE, ADULT - FALL HARM RISK - UNIVERSAL INTERVENTIONS
Bed in lowest position, wheels locked, appropriate side rails in place/Call bell, personal items and telephone in reach/Instruct patient to call for assistance before getting out of bed or chair/Non-slip footwear when patient is out of bed/[place] to call system/Physically safe environment - no spills, clutter or unnecessary equipment/Purposeful Proactive Rounding/Room/bathroom lighting operational, light cord in reach

## 2025-05-08 NOTE — PROGRESS NOTE ADULT - SUBJECTIVE AND OBJECTIVE BOX
55 year old female patient presents for surgery on her LEFT foot for painful plantar fasciitis. Pt has failed conservative management for this condition including but not limited to; orthotics, rest, ice, anti inflammatory, injections, ESWT, PT. All risks, benefits, alternatives, disability following surgery explained. Patient acknowledged understanding. Pre surgical testing completed.     A: Plantar fasciitis, LEFT foot  P: Ultrasound guided partial plantar fasciectomy; LEFT foot

## 2025-05-09 ENCOUNTER — OUTPATIENT (OUTPATIENT)
Dept: OUTPATIENT SERVICES | Facility: HOSPITAL | Age: 55
LOS: 1 days | End: 2025-05-09
Payer: COMMERCIAL

## 2025-05-09 ENCOUNTER — TRANSCRIPTION ENCOUNTER (OUTPATIENT)
Age: 55
End: 2025-05-09

## 2025-05-09 VITALS
TEMPERATURE: 98 F | HEART RATE: 85 BPM | HEIGHT: 62.5 IN | SYSTOLIC BLOOD PRESSURE: 132 MMHG | RESPIRATION RATE: 15 BRPM | WEIGHT: 220.02 LBS | OXYGEN SATURATION: 96 % | DIASTOLIC BLOOD PRESSURE: 81 MMHG

## 2025-05-09 VITALS
HEART RATE: 96 BPM | SYSTOLIC BLOOD PRESSURE: 122 MMHG | OXYGEN SATURATION: 96 % | RESPIRATION RATE: 18 BRPM | TEMPERATURE: 98 F | DIASTOLIC BLOOD PRESSURE: 83 MMHG

## 2025-05-09 DIAGNOSIS — M72.2 PLANTAR FASCIAL FIBROMATOSIS: ICD-10-CM

## 2025-05-09 DIAGNOSIS — Z98.890 OTHER SPECIFIED POSTPROCEDURAL STATES: Chronic | ICD-10-CM

## 2025-05-09 DIAGNOSIS — Z98.891 HISTORY OF UTERINE SCAR FROM PREVIOUS SURGERY: Chronic | ICD-10-CM

## 2025-05-09 PROCEDURE — 76998 US GUIDE INTRAOP: CPT

## 2025-05-09 PROCEDURE — 28062 REMOVAL OF FOOT FASCIA: CPT | Mod: LT

## 2025-05-09 PROCEDURE — C9399: CPT

## 2025-05-09 PROCEDURE — C1713: CPT

## 2025-05-09 DEVICE — K-WIRE S&N DOUBLE TROCAR 0.062" X 4": Type: IMPLANTABLE DEVICE | Site: LEFT | Status: FUNCTIONAL

## 2025-05-09 DEVICE — K-WIRE S&N DOUBLE TROCAR 0.045" X 4": Type: IMPLANTABLE DEVICE | Site: LEFT | Status: FUNCTIONAL

## 2025-05-09 RX ORDER — SODIUM CHLORIDE 9 G/1000ML
1000 INJECTION, SOLUTION INTRAVENOUS
Refills: 0 | Status: DISCONTINUED | OUTPATIENT
Start: 2025-05-09 | End: 2025-05-09

## 2025-05-09 RX ORDER — HYDROMORPHONE/SOD CHLOR,ISO/PF 2 MG/10 ML
0.5 SYRINGE (ML) INJECTION
Refills: 0 | Status: DISCONTINUED | OUTPATIENT
Start: 2025-05-09 | End: 2025-05-09

## 2025-05-09 RX ORDER — ACETAMINOPHEN 500 MG/5ML
1000 LIQUID (ML) ORAL ONCE
Refills: 0 | Status: DISCONTINUED | OUTPATIENT
Start: 2025-05-09 | End: 2025-05-09

## 2025-05-09 RX ORDER — FLUTICASONE FUROATE, UMECLIDINIUM BROMIDE AND VILANTEROL TRIFENATATE 100; 62.5; 25 UG/1; UG/1; UG/1
1 POWDER RESPIRATORY (INHALATION)
Refills: 0 | DISCHARGE

## 2025-05-09 RX ORDER — GABAPENTIN 400 MG/1
1 CAPSULE ORAL
Refills: 0 | DISCHARGE

## 2025-05-09 RX ORDER — DULOXETINE 20 MG/1
1 CAPSULE, DELAYED RELEASE ORAL
Refills: 0 | DISCHARGE

## 2025-05-09 RX ORDER — OMEPRAZOLE 20 MG/1
1 CAPSULE, DELAYED RELEASE ORAL
Refills: 0 | DISCHARGE

## 2025-05-09 RX ORDER — ONDANSETRON HCL/PF 4 MG/2 ML
4 VIAL (ML) INJECTION ONCE
Refills: 0 | Status: DISCONTINUED | OUTPATIENT
Start: 2025-05-09 | End: 2025-05-09

## 2025-05-09 RX ORDER — BUPROPION HYDROBROMIDE 522 MG/1
1 TABLET, EXTENDED RELEASE ORAL
Refills: 0 | DISCHARGE

## 2025-05-09 RX ORDER — OXYCODONE HYDROCHLORIDE 30 MG/1
5 TABLET ORAL ONCE
Refills: 0 | Status: DISCONTINUED | OUTPATIENT
Start: 2025-05-09 | End: 2025-05-09

## 2025-05-09 RX ORDER — CLINDAMYCIN PHOSPHATE 150 MG/ML
900 VIAL (ML) INJECTION ONCE
Refills: 0 | Status: COMPLETED | OUTPATIENT
Start: 2025-05-09 | End: 2025-05-09

## 2025-05-09 RX ORDER — NORETHINDRONE 0.35 MG/1
0 TABLET ORAL
Refills: 0 | DISCHARGE

## 2025-05-09 RX ADMIN — SODIUM CHLORIDE 75 MILLILITER(S): 9 INJECTION, SOLUTION INTRAVENOUS at 11:42

## 2025-05-09 NOTE — ASU DISCHARGE PLAN (ADULT/PEDIATRIC) - NS MD DC FALL RISK RISK
For information on Fall & Injury Prevention, visit: [email or web address] OR  [email or web address] OR  https://www.cdc.gov/steadi/patient.html

## 2025-05-09 NOTE — ASU DISCHARGE PLAN (ADULT/PEDIATRIC) - FINANCIAL ASSISTANCE
[facility] provides services at a reduced cost to those who are determined to be eligible through [facility]’s financial assistance program. Information regarding [facility]’s financial assistance program can be found by going to [email or web address] or by calling [phone].

## 2025-05-09 NOTE — ASU DISCHARGE PLAN (ADULT/PEDIATRIC) - CARE PROVIDER_API CALL
Umang Wilson  Podiatric Medicine  [address] [place] [id number]  Phone: [phone]  Fax: [phone]  Follow Up Time: 1 week

## 2025-05-29 ENCOUNTER — NON-APPOINTMENT (OUTPATIENT)
Age: 55
End: 2025-05-29

## 2025-08-11 LAB
ALBUMIN SERPL ELPH-MCNC: 4.2 G/DL
ALP BLD-CCNC: 88 U/L
ALT SERPL-CCNC: 12 U/L
ANION GAP SERPL CALC-SCNC: 14 MMOL/L
AST SERPL-CCNC: 14 U/L
BILIRUB SERPL-MCNC: 0.4 MG/DL
BUN SERPL-MCNC: 8 MG/DL
CALCIUM SERPL-MCNC: 9.1 MG/DL
CHLORIDE SERPL-SCNC: 105 MMOL/L
CHOLEST SERPL-MCNC: 218 MG/DL
CO2 SERPL-SCNC: 23 MMOL/L
CREAT SERPL-MCNC: 0.78 MG/DL
EGFRCR SERPLBLD CKD-EPI 2021: 90 ML/MIN/1.73M2
ESTIMATED AVERAGE GLUCOSE: 111 MG/DL
GLUCOSE SERPL-MCNC: 103 MG/DL
HBA1C MFR BLD HPLC: 5.5 %
HDLC SERPL-MCNC: 71 MG/DL
LDLC SERPL-MCNC: 106 MG/DL
NONHDLC SERPL-MCNC: 147 MG/DL
POTASSIUM SERPL-SCNC: 4 MMOL/L
PROT SERPL-MCNC: 6.6 G/DL
SODIUM SERPL-SCNC: 141 MMOL/L
TRIGL SERPL-MCNC: 243 MG/DL

## 2025-08-11 RX ORDER — ATORVASTATIN CALCIUM 20 MG/1
20 TABLET, FILM COATED ORAL DAILY
Qty: 90 | Refills: 1 | Status: ACTIVE | COMMUNITY
Start: 2025-08-11 | End: 1900-01-01

## 2025-08-15 ENCOUNTER — APPOINTMENT (OUTPATIENT)
Dept: CARDIOLOGY | Facility: CLINIC | Age: 55
End: 2025-08-15
Payer: COMMERCIAL

## 2025-08-15 VITALS — BODY MASS INDEX: 40.52 KG/M2 | WEIGHT: 218 LBS

## 2025-08-15 VITALS
HEART RATE: 84 BPM | HEIGHT: 61.5 IN | WEIGHT: 230 LBS | OXYGEN SATURATION: 98 % | SYSTOLIC BLOOD PRESSURE: 138 MMHG | BODY MASS INDEX: 42.87 KG/M2 | DIASTOLIC BLOOD PRESSURE: 81 MMHG

## 2025-08-15 VITALS — SYSTOLIC BLOOD PRESSURE: 126 MMHG | DIASTOLIC BLOOD PRESSURE: 86 MMHG

## 2025-08-15 DIAGNOSIS — I10 ESSENTIAL (PRIMARY) HYPERTENSION: ICD-10-CM

## 2025-08-15 DIAGNOSIS — E78.5 HYPERLIPIDEMIA, UNSPECIFIED: ICD-10-CM

## 2025-08-15 PROCEDURE — 99214 OFFICE O/P EST MOD 30 MIN: CPT | Mod: 25

## 2025-08-15 PROCEDURE — 93000 ELECTROCARDIOGRAM COMPLETE: CPT

## (undated) DEVICE — WARMING BLANKET UPPER ADULT

## (undated) DEVICE — GLV 8 PROTEXIS (WHITE)

## (undated) DEVICE — NDL HYPO SAFE 18G X 1.5" (PINK)

## (undated) DEVICE — SOL IRR POUR NS 0.9% 500ML

## (undated) DEVICE — DRSG TEGADERM 2.5 X 3"

## (undated) DEVICE — PLV-SCD MACHINE: Type: DURABLE MEDICAL EQUIPMENT

## (undated) DEVICE — GLV 7.5 PROTEXIS (WHITE)

## (undated) DEVICE — PLV/PSP-TOURNIQUET #12 40150710004: Type: DURABLE MEDICAL EQUIPMENT

## (undated) DEVICE — SYR LUER LOK 10CC

## (undated) DEVICE — SUT MONOSOF 4-0 18" P-13

## (undated) DEVICE — VENODYNE/SCD SLEEVE CALF LARGE

## (undated) DEVICE — DRSG KERLIX ROLL LG 4.5"

## (undated) DEVICE — PACK PROC TISSUE REMOVAL TX2

## (undated) DEVICE — NDL HYPO REGULAR BEVEL 25G X 1.5" (BLUE)

## (undated) DEVICE — VENODYNE/SCD SLEEVE CALF MEDIUM

## (undated) DEVICE — PACK LOWER EXTREMITY NS PLAINVI